# Patient Record
Sex: FEMALE | Race: WHITE | Employment: OTHER | URBAN - METROPOLITAN AREA
[De-identification: names, ages, dates, MRNs, and addresses within clinical notes are randomized per-mention and may not be internally consistent; named-entity substitution may affect disease eponyms.]

---

## 2017-01-25 ENCOUNTER — TELEPHONE (OUTPATIENT)
Dept: FAMILY MEDICINE CLINIC | Age: 72
End: 2017-01-25

## 2017-02-03 ENCOUNTER — OFFICE VISIT (OUTPATIENT)
Dept: FAMILY MEDICINE CLINIC | Age: 72
End: 2017-02-03

## 2017-02-03 VITALS
OXYGEN SATURATION: 98 % | DIASTOLIC BLOOD PRESSURE: 74 MMHG | BODY MASS INDEX: 40.14 KG/M2 | TEMPERATURE: 98.4 F | SYSTOLIC BLOOD PRESSURE: 134 MMHG | HEART RATE: 61 BPM | WEIGHT: 241.2 LBS

## 2017-02-03 DIAGNOSIS — I10 ESSENTIAL HYPERTENSION: ICD-10-CM

## 2017-02-03 DIAGNOSIS — E78.00 PURE HYPERCHOLESTEROLEMIA: ICD-10-CM

## 2017-02-03 DIAGNOSIS — K43.9 VENTRAL HERNIA WITHOUT OBSTRUCTION OR GANGRENE: ICD-10-CM

## 2017-02-03 DIAGNOSIS — E11.9 DIABETES MELLITUS WITHOUT COMPLICATION (HCC): Primary | ICD-10-CM

## 2017-02-03 DIAGNOSIS — M85.80 OSTEOPENIA: ICD-10-CM

## 2017-02-03 DIAGNOSIS — F32.89 OTHER DEPRESSION: ICD-10-CM

## 2017-02-03 LAB
BILIRUBIN, POC: NORMAL
BLOOD URINE, POC: NORMAL
CLARITY, POC: CLEAR
COLOR, POC: YELLOW
GLUCOSE BLD-MCNC: 179 MG/DL
GLUCOSE URINE, POC: NORMAL
KETONES, POC: NORMAL
LEUKOCYTE EST, POC: NORMAL
NITRITE, POC: NORMAL
PH, POC: 5.5
PROTEIN, POC: NORMAL
SPECIFIC GRAVITY, POC: 1.02
UROBILINOGEN, POC: 0.2

## 2017-02-03 PROCEDURE — 99214 OFFICE O/P EST MOD 30 MIN: CPT | Performed by: FAMILY MEDICINE

## 2017-02-03 PROCEDURE — 81002 URINALYSIS NONAUTO W/O SCOPE: CPT | Performed by: FAMILY MEDICINE

## 2017-02-03 PROCEDURE — 82962 GLUCOSE BLOOD TEST: CPT | Performed by: FAMILY MEDICINE

## 2017-02-03 RX ORDER — BISOPROLOL FUMARATE AND HYDROCHLOROTHIAZIDE 10; 6.25 MG/1; MG/1
1 TABLET ORAL DAILY
Qty: 90 TABLET | Refills: 0 | Status: SHIPPED | OUTPATIENT
Start: 2017-02-03 | End: 2017-05-05 | Stop reason: SDUPTHER

## 2017-02-03 RX ORDER — LISINOPRIL 10 MG/1
10 TABLET ORAL DAILY
Qty: 90 TABLET | Refills: 0 | Status: SHIPPED | OUTPATIENT
Start: 2017-02-03 | End: 2017-05-05 | Stop reason: SDUPTHER

## 2017-02-03 RX ORDER — GLIPIZIDE 5 MG/1
5 TABLET, FILM COATED, EXTENDED RELEASE ORAL DAILY
Qty: 90 TABLET | Refills: 0 | Status: SHIPPED | OUTPATIENT
Start: 2017-02-03 | End: 2017-05-05 | Stop reason: SDUPTHER

## 2017-02-03 RX ORDER — PRAVASTATIN SODIUM 40 MG
40 TABLET ORAL DAILY
Qty: 90 TABLET | Refills: 0 | Status: SHIPPED | OUTPATIENT
Start: 2017-02-03 | End: 2017-05-05 | Stop reason: SDUPTHER

## 2017-02-03 RX ORDER — SERTRALINE HYDROCHLORIDE 100 MG/1
100 TABLET, FILM COATED ORAL DAILY
Qty: 90 TABLET | Refills: 0 | Status: SHIPPED | OUTPATIENT
Start: 2017-02-03 | End: 2017-05-05 | Stop reason: SDUPTHER

## 2017-02-09 ASSESSMENT — ENCOUNTER SYMPTOMS
BLOOD IN STOOL: 0
COUGH: 0
CHEST TIGHTNESS: 0
ABDOMINAL PAIN: 0
SHORTNESS OF BREATH: 0

## 2017-04-28 DIAGNOSIS — E78.00 PURE HYPERCHOLESTEROLEMIA: ICD-10-CM

## 2017-04-28 DIAGNOSIS — M85.80 OSTEOPENIA: ICD-10-CM

## 2017-04-28 DIAGNOSIS — E11.9 DIABETES MELLITUS WITHOUT COMPLICATION (HCC): ICD-10-CM

## 2017-04-28 LAB
A/G RATIO: 1.6 (ref 1.1–2.2)
ALBUMIN SERPL-MCNC: 4.2 G/DL (ref 3.4–5)
ALP BLD-CCNC: 122 U/L (ref 40–129)
ALT SERPL-CCNC: 15 U/L (ref 10–40)
ANION GAP SERPL CALCULATED.3IONS-SCNC: 17 MMOL/L (ref 3–16)
AST SERPL-CCNC: 15 U/L (ref 15–37)
BASOPHILS ABSOLUTE: 0.1 K/UL (ref 0–0.2)
BASOPHILS RELATIVE PERCENT: 0.6 %
BILIRUB SERPL-MCNC: 0.4 MG/DL (ref 0–1)
BUN BLDV-MCNC: 33 MG/DL (ref 7–20)
CALCIUM SERPL-MCNC: 9 MG/DL (ref 8.3–10.6)
CHLORIDE BLD-SCNC: 99 MMOL/L (ref 99–110)
CHOLESTEROL, TOTAL: 180 MG/DL (ref 0–199)
CO2: 24 MMOL/L (ref 21–32)
CREAT SERPL-MCNC: 1.2 MG/DL (ref 0.6–1.2)
EOSINOPHILS ABSOLUTE: 0.2 K/UL (ref 0–0.6)
EOSINOPHILS RELATIVE PERCENT: 2 %
GFR AFRICAN AMERICAN: 53
GFR NON-AFRICAN AMERICAN: 44
GLOBULIN: 2.7 G/DL
GLUCOSE BLD-MCNC: 109 MG/DL (ref 70–99)
HCT VFR BLD CALC: 39.8 % (ref 36–48)
HDLC SERPL-MCNC: 43 MG/DL (ref 40–60)
HEMOGLOBIN: 13.4 G/DL (ref 12–16)
LDL CHOLESTEROL CALCULATED: 94 MG/DL
LYMPHOCYTES ABSOLUTE: 1.6 K/UL (ref 1–5.1)
LYMPHOCYTES RELATIVE PERCENT: 13.3 %
MCH RBC QN AUTO: 30.9 PG (ref 26–34)
MCHC RBC AUTO-ENTMCNC: 33.7 G/DL (ref 31–36)
MCV RBC AUTO: 91.7 FL (ref 80–100)
MONOCYTES ABSOLUTE: 0.6 K/UL (ref 0–1.3)
MONOCYTES RELATIVE PERCENT: 5.1 %
NEUTROPHILS ABSOLUTE: 9.7 K/UL (ref 1.7–7.7)
NEUTROPHILS RELATIVE PERCENT: 79 %
PDW BLD-RTO: 14.4 % (ref 12.4–15.4)
PLATELET # BLD: 268 K/UL (ref 135–450)
PMV BLD AUTO: 7.7 FL (ref 5–10.5)
POTASSIUM SERPL-SCNC: 5 MMOL/L (ref 3.5–5.1)
RBC # BLD: 4.34 M/UL (ref 4–5.2)
SODIUM BLD-SCNC: 140 MMOL/L (ref 136–145)
TOTAL CK: 36 U/L (ref 26–192)
TOTAL PROTEIN: 6.9 G/DL (ref 6.4–8.2)
TRIGL SERPL-MCNC: 217 MG/DL (ref 0–150)
VLDLC SERPL CALC-MCNC: 43 MG/DL
WBC # BLD: 12.3 K/UL (ref 4–11)

## 2017-04-29 LAB
ESTIMATED AVERAGE GLUCOSE: 125.5 MG/DL
HBA1C MFR BLD: 6 %

## 2017-05-05 ENCOUNTER — OFFICE VISIT (OUTPATIENT)
Dept: FAMILY MEDICINE CLINIC | Age: 72
End: 2017-05-05

## 2017-05-05 VITALS
WEIGHT: 241.6 LBS | DIASTOLIC BLOOD PRESSURE: 78 MMHG | OXYGEN SATURATION: 96 % | SYSTOLIC BLOOD PRESSURE: 130 MMHG | TEMPERATURE: 97.3 F | HEART RATE: 58 BPM | BODY MASS INDEX: 40.2 KG/M2

## 2017-05-05 DIAGNOSIS — I10 ESSENTIAL HYPERTENSION: ICD-10-CM

## 2017-05-05 DIAGNOSIS — M1A.0790 IDIOPATHIC CHRONIC GOUT OF FOOT WITHOUT TOPHUS, UNSPECIFIED LATERALITY: ICD-10-CM

## 2017-05-05 DIAGNOSIS — E11.9 DIABETES MELLITUS WITHOUT COMPLICATION (HCC): Primary | ICD-10-CM

## 2017-05-05 DIAGNOSIS — E78.00 PURE HYPERCHOLESTEROLEMIA: ICD-10-CM

## 2017-05-05 DIAGNOSIS — D72.829 LEUKOCYTOSIS, UNSPECIFIED TYPE: ICD-10-CM

## 2017-05-05 DIAGNOSIS — M85.80 OSTEOPENIA: ICD-10-CM

## 2017-05-05 PROBLEM — H91.93 BILATERAL HEARING LOSS: Status: ACTIVE | Noted: 2017-05-05

## 2017-05-05 LAB
BILIRUBIN, POC: NORMAL
BLOOD URINE, POC: NORMAL
CLARITY, POC: CLEAR
COLOR, POC: YELLOW
GLUCOSE BLD-MCNC: 98 MG/DL
GLUCOSE URINE, POC: NORMAL
KETONES, POC: NORMAL
LEUKOCYTE EST, POC: NORMAL
NITRITE, POC: NORMAL
PH, POC: 5.5
PROTEIN, POC: NORMAL
SPECIFIC GRAVITY, POC: 1.02
UROBILINOGEN, POC: 0.2

## 2017-05-05 PROCEDURE — 82962 GLUCOSE BLOOD TEST: CPT | Performed by: FAMILY MEDICINE

## 2017-05-05 PROCEDURE — 81002 URINALYSIS NONAUTO W/O SCOPE: CPT | Performed by: FAMILY MEDICINE

## 2017-05-05 PROCEDURE — 99214 OFFICE O/P EST MOD 30 MIN: CPT | Performed by: FAMILY MEDICINE

## 2017-05-05 RX ORDER — GLIPIZIDE 5 MG/1
5 TABLET, FILM COATED, EXTENDED RELEASE ORAL DAILY
Qty: 90 TABLET | Refills: 0 | Status: SHIPPED | OUTPATIENT
Start: 2017-05-05 | End: 2017-08-01 | Stop reason: SDUPTHER

## 2017-05-05 RX ORDER — LISINOPRIL 10 MG/1
10 TABLET ORAL DAILY
Qty: 90 TABLET | Refills: 0 | Status: SHIPPED | OUTPATIENT
Start: 2017-05-05 | End: 2017-08-01 | Stop reason: SDUPTHER

## 2017-05-05 RX ORDER — PRAVASTATIN SODIUM 40 MG
40 TABLET ORAL DAILY
Qty: 90 TABLET | Refills: 0 | Status: SHIPPED | OUTPATIENT
Start: 2017-05-05 | End: 2017-08-01 | Stop reason: SDUPTHER

## 2017-05-05 RX ORDER — BISOPROLOL FUMARATE AND HYDROCHLOROTHIAZIDE 10; 6.25 MG/1; MG/1
1 TABLET ORAL DAILY
Qty: 90 TABLET | Refills: 0 | Status: SHIPPED | OUTPATIENT
Start: 2017-05-05 | End: 2017-08-01 | Stop reason: SDUPTHER

## 2017-05-05 RX ORDER — PENICILLIN V POTASSIUM 500 MG/1
TABLET ORAL
COMMUNITY
Start: 2017-05-03 | End: 2019-09-10

## 2017-05-05 RX ORDER — SERTRALINE HYDROCHLORIDE 100 MG/1
100 TABLET, FILM COATED ORAL DAILY
Qty: 90 TABLET | Refills: 0 | Status: SHIPPED | OUTPATIENT
Start: 2017-05-05 | End: 2017-08-01 | Stop reason: SDUPTHER

## 2017-05-05 ASSESSMENT — ENCOUNTER SYMPTOMS
ABDOMINAL PAIN: 0
SINUS PRESSURE: 0
BLOOD IN STOOL: 0
SHORTNESS OF BREATH: 0
CHEST TIGHTNESS: 0
COUGH: 0
SORE THROAT: 0

## 2017-07-25 DIAGNOSIS — E11.9 DIABETES MELLITUS WITHOUT COMPLICATION (HCC): ICD-10-CM

## 2017-07-25 DIAGNOSIS — E78.00 PURE HYPERCHOLESTEROLEMIA: ICD-10-CM

## 2017-07-25 LAB
A/G RATIO: 1.3 (ref 1.1–2.2)
ALBUMIN SERPL-MCNC: 4.1 G/DL (ref 3.4–5)
ALP BLD-CCNC: 123 U/L (ref 40–129)
ALT SERPL-CCNC: 15 U/L (ref 10–40)
ANION GAP SERPL CALCULATED.3IONS-SCNC: 14 MMOL/L (ref 3–16)
AST SERPL-CCNC: 15 U/L (ref 15–37)
BASOPHILS ABSOLUTE: 0.1 K/UL (ref 0–0.2)
BASOPHILS RELATIVE PERCENT: 0.6 %
BILIRUB SERPL-MCNC: 0.5 MG/DL (ref 0–1)
BUN BLDV-MCNC: 29 MG/DL (ref 7–20)
CALCIUM SERPL-MCNC: 9.3 MG/DL (ref 8.3–10.6)
CHLORIDE BLD-SCNC: 100 MMOL/L (ref 99–110)
CHOLESTEROL, TOTAL: 174 MG/DL (ref 0–199)
CO2: 24 MMOL/L (ref 21–32)
CREAT SERPL-MCNC: 1 MG/DL (ref 0.6–1.2)
EOSINOPHILS ABSOLUTE: 0.2 K/UL (ref 0–0.6)
EOSINOPHILS RELATIVE PERCENT: 1.6 %
GFR AFRICAN AMERICAN: >60
GFR NON-AFRICAN AMERICAN: 54
GLOBULIN: 3.1 G/DL
GLUCOSE BLD-MCNC: 110 MG/DL (ref 70–99)
HCT VFR BLD CALC: 40.2 % (ref 36–48)
HDLC SERPL-MCNC: 47 MG/DL (ref 40–60)
HEMOGLOBIN: 13.5 G/DL (ref 12–16)
LDL CHOLESTEROL CALCULATED: 85 MG/DL
LYMPHOCYTES ABSOLUTE: 2.3 K/UL (ref 1–5.1)
LYMPHOCYTES RELATIVE PERCENT: 17.8 %
MCH RBC QN AUTO: 31.5 PG (ref 26–34)
MCHC RBC AUTO-ENTMCNC: 33.4 G/DL (ref 31–36)
MCV RBC AUTO: 94.1 FL (ref 80–100)
MONOCYTES ABSOLUTE: 0.7 K/UL (ref 0–1.3)
MONOCYTES RELATIVE PERCENT: 5.3 %
NEUTROPHILS ABSOLUTE: 9.6 K/UL (ref 1.7–7.7)
NEUTROPHILS RELATIVE PERCENT: 74.7 %
PDW BLD-RTO: 14.8 % (ref 12.4–15.4)
PLATELET # BLD: 279 K/UL (ref 135–450)
PMV BLD AUTO: 7.9 FL (ref 5–10.5)
POTASSIUM SERPL-SCNC: 5 MMOL/L (ref 3.5–5.1)
RBC # BLD: 4.28 M/UL (ref 4–5.2)
SODIUM BLD-SCNC: 138 MMOL/L (ref 136–145)
TOTAL CK: 41 U/L (ref 26–192)
TOTAL PROTEIN: 7.2 G/DL (ref 6.4–8.2)
TRIGL SERPL-MCNC: 211 MG/DL (ref 0–150)
VLDLC SERPL CALC-MCNC: 42 MG/DL
WBC # BLD: 12.8 K/UL (ref 4–11)

## 2017-07-26 LAB
ESTIMATED AVERAGE GLUCOSE: 125.5 MG/DL
HBA1C MFR BLD: 6 %

## 2017-08-01 ENCOUNTER — OFFICE VISIT (OUTPATIENT)
Dept: FAMILY MEDICINE CLINIC | Age: 72
End: 2017-08-01

## 2017-08-01 VITALS
OXYGEN SATURATION: 97 % | HEART RATE: 52 BPM | WEIGHT: 244.2 LBS | DIASTOLIC BLOOD PRESSURE: 84 MMHG | BODY MASS INDEX: 40.64 KG/M2 | SYSTOLIC BLOOD PRESSURE: 132 MMHG | TEMPERATURE: 98.2 F

## 2017-08-01 DIAGNOSIS — F32.A DEPRESSION, UNSPECIFIED DEPRESSION TYPE: ICD-10-CM

## 2017-08-01 DIAGNOSIS — I10 ESSENTIAL HYPERTENSION: Primary | ICD-10-CM

## 2017-08-01 DIAGNOSIS — M85.80 OSTEOPENIA, UNSPECIFIED LOCATION: ICD-10-CM

## 2017-08-01 DIAGNOSIS — D72.829 LEUKOCYTOSIS, UNSPECIFIED TYPE: ICD-10-CM

## 2017-08-01 DIAGNOSIS — E11.69 TYPE 2 DIABETES MELLITUS WITH OTHER SPECIFIED COMPLICATION (HCC): ICD-10-CM

## 2017-08-01 DIAGNOSIS — B00.89 HERPES DERMATITIS: ICD-10-CM

## 2017-08-01 DIAGNOSIS — E78.00 PURE HYPERCHOLESTEROLEMIA: ICD-10-CM

## 2017-08-01 PROCEDURE — 99214 OFFICE O/P EST MOD 30 MIN: CPT | Performed by: FAMILY MEDICINE

## 2017-08-01 RX ORDER — LISINOPRIL 10 MG/1
10 TABLET ORAL DAILY
Qty: 90 TABLET | Refills: 0 | Status: SHIPPED | OUTPATIENT
Start: 2017-08-01 | End: 2017-10-31 | Stop reason: SDUPTHER

## 2017-08-01 RX ORDER — GLIPIZIDE 5 MG/1
5 TABLET, FILM COATED, EXTENDED RELEASE ORAL DAILY
Qty: 90 TABLET | Refills: 0 | Status: SHIPPED | OUTPATIENT
Start: 2017-08-01 | End: 2017-10-31 | Stop reason: SDUPTHER

## 2017-08-01 RX ORDER — BISOPROLOL FUMARATE AND HYDROCHLOROTHIAZIDE 10; 6.25 MG/1; MG/1
1 TABLET ORAL DAILY
Qty: 90 TABLET | Refills: 0 | Status: SHIPPED | OUTPATIENT
Start: 2017-08-01 | End: 2017-10-31 | Stop reason: SDUPTHER

## 2017-08-01 RX ORDER — PRAVASTATIN SODIUM 40 MG
40 TABLET ORAL DAILY
Qty: 90 TABLET | Refills: 0 | Status: SHIPPED | OUTPATIENT
Start: 2017-08-01 | End: 2017-10-31 | Stop reason: SDUPTHER

## 2017-08-01 RX ORDER — SERTRALINE HYDROCHLORIDE 100 MG/1
100 TABLET, FILM COATED ORAL DAILY
Qty: 90 TABLET | Refills: 0 | Status: SHIPPED | OUTPATIENT
Start: 2017-08-01 | End: 2017-10-31 | Stop reason: SDUPTHER

## 2017-08-02 ASSESSMENT — ENCOUNTER SYMPTOMS
COUGH: 0
CHEST TIGHTNESS: 0
SHORTNESS OF BREATH: 0
BLOOD IN STOOL: 0
ABDOMINAL PAIN: 0

## 2017-10-23 DIAGNOSIS — E78.00 PURE HYPERCHOLESTEROLEMIA: ICD-10-CM

## 2017-10-23 DIAGNOSIS — E11.69 TYPE 2 DIABETES MELLITUS WITH OTHER SPECIFIED COMPLICATION (HCC): ICD-10-CM

## 2017-10-23 DIAGNOSIS — I10 ESSENTIAL HYPERTENSION: ICD-10-CM

## 2017-10-23 LAB
A/G RATIO: 1.5 (ref 1.1–2.2)
ALBUMIN SERPL-MCNC: 4.1 G/DL (ref 3.4–5)
ALP BLD-CCNC: 115 U/L (ref 40–129)
ALT SERPL-CCNC: 14 U/L (ref 10–40)
ANION GAP SERPL CALCULATED.3IONS-SCNC: 15 MMOL/L (ref 3–16)
AST SERPL-CCNC: 13 U/L (ref 15–37)
BILIRUB SERPL-MCNC: 0.4 MG/DL (ref 0–1)
BUN BLDV-MCNC: 33 MG/DL (ref 7–20)
CALCIUM SERPL-MCNC: 9.3 MG/DL (ref 8.3–10.6)
CHLORIDE BLD-SCNC: 98 MMOL/L (ref 99–110)
CHOLESTEROL, TOTAL: 169 MG/DL (ref 0–199)
CO2: 24 MMOL/L (ref 21–32)
CREAT SERPL-MCNC: 1 MG/DL (ref 0.6–1.2)
GFR AFRICAN AMERICAN: >60
GFR NON-AFRICAN AMERICAN: 54
GLOBULIN: 2.7 G/DL
GLUCOSE BLD-MCNC: 158 MG/DL (ref 70–99)
HDLC SERPL-MCNC: 50 MG/DL (ref 40–60)
LDL CHOLESTEROL CALCULATED: 73 MG/DL
POTASSIUM SERPL-SCNC: 4.7 MMOL/L (ref 3.5–5.1)
SODIUM BLD-SCNC: 137 MMOL/L (ref 136–145)
TOTAL CK: 39 U/L (ref 26–192)
TOTAL PROTEIN: 6.8 G/DL (ref 6.4–8.2)
TRIGL SERPL-MCNC: 231 MG/DL (ref 0–150)
VLDLC SERPL CALC-MCNC: 46 MG/DL

## 2017-10-24 LAB
ESTIMATED AVERAGE GLUCOSE: 134.1 MG/DL
HBA1C MFR BLD: 6.3 %

## 2017-10-31 ENCOUNTER — OFFICE VISIT (OUTPATIENT)
Dept: FAMILY MEDICINE CLINIC | Age: 72
End: 2017-10-31

## 2017-10-31 VITALS
HEART RATE: 67 BPM | TEMPERATURE: 98.2 F | OXYGEN SATURATION: 96 % | BODY MASS INDEX: 40.6 KG/M2 | WEIGHT: 244 LBS | DIASTOLIC BLOOD PRESSURE: 80 MMHG | SYSTOLIC BLOOD PRESSURE: 118 MMHG

## 2017-10-31 DIAGNOSIS — E78.00 PURE HYPERCHOLESTEROLEMIA: ICD-10-CM

## 2017-10-31 DIAGNOSIS — Z23 NEED FOR VACCINATION: ICD-10-CM

## 2017-10-31 DIAGNOSIS — M70.72 BURSITIS OF LEFT HIP, UNSPECIFIED BURSA: ICD-10-CM

## 2017-10-31 DIAGNOSIS — M85.80 OSTEOPENIA, UNSPECIFIED LOCATION: ICD-10-CM

## 2017-10-31 DIAGNOSIS — E11.8 TYPE 2 DIABETES MELLITUS WITH COMPLICATION, WITHOUT LONG-TERM CURRENT USE OF INSULIN (HCC): Primary | ICD-10-CM

## 2017-10-31 DIAGNOSIS — I10 ESSENTIAL HYPERTENSION: ICD-10-CM

## 2017-10-31 DIAGNOSIS — M25.552 PAIN OF LEFT HIP JOINT: ICD-10-CM

## 2017-10-31 DIAGNOSIS — M1A.0720 IDIOPATHIC CHRONIC GOUT OF LEFT FOOT WITHOUT TOPHUS: ICD-10-CM

## 2017-10-31 DIAGNOSIS — F32.A DEPRESSION, UNSPECIFIED DEPRESSION TYPE: ICD-10-CM

## 2017-10-31 DIAGNOSIS — H35.30 MACULAR DEGENERATION: ICD-10-CM

## 2017-10-31 DIAGNOSIS — D72.829 LEUKOCYTOSIS, UNSPECIFIED TYPE: ICD-10-CM

## 2017-10-31 LAB
BILIRUBIN, POC: NORMAL
BLOOD URINE, POC: NORMAL
CLARITY, POC: NORMAL
COLOR, POC: YELLOW
GLUCOSE BLD-MCNC: 145 MG/DL
GLUCOSE URINE, POC: NORMAL
KETONES, POC: NORMAL
LEUKOCYTE EST, POC: NORMAL
NITRITE, POC: NORMAL
PH, POC: 5.5
PROTEIN, POC: NORMAL
SPECIFIC GRAVITY, POC: 1.02
UROBILINOGEN, POC: 0.2

## 2017-10-31 PROCEDURE — 90662 IIV NO PRSV INCREASED AG IM: CPT | Performed by: FAMILY MEDICINE

## 2017-10-31 PROCEDURE — G0009 ADMIN PNEUMOCOCCAL VACCINE: HCPCS | Performed by: FAMILY MEDICINE

## 2017-10-31 PROCEDURE — 90732 PPSV23 VACC 2 YRS+ SUBQ/IM: CPT | Performed by: FAMILY MEDICINE

## 2017-10-31 PROCEDURE — G0008 ADMIN INFLUENZA VIRUS VAC: HCPCS | Performed by: FAMILY MEDICINE

## 2017-10-31 PROCEDURE — 99215 OFFICE O/P EST HI 40 MIN: CPT | Performed by: FAMILY MEDICINE

## 2017-10-31 PROCEDURE — 82962 GLUCOSE BLOOD TEST: CPT | Performed by: FAMILY MEDICINE

## 2017-10-31 PROCEDURE — 81002 URINALYSIS NONAUTO W/O SCOPE: CPT | Performed by: FAMILY MEDICINE

## 2017-10-31 RX ORDER — GLIPIZIDE 5 MG/1
5 TABLET, FILM COATED, EXTENDED RELEASE ORAL DAILY
Qty: 90 TABLET | Refills: 0 | Status: SHIPPED | OUTPATIENT
Start: 2017-10-31 | End: 2018-01-03 | Stop reason: SDUPTHER

## 2017-10-31 RX ORDER — SERTRALINE HYDROCHLORIDE 100 MG/1
TABLET, FILM COATED ORAL
Qty: 135 TABLET | Refills: 0 | Status: SHIPPED | OUTPATIENT
Start: 2017-10-31 | End: 2018-01-03 | Stop reason: SDUPTHER

## 2017-10-31 RX ORDER — ALLOPURINOL 100 MG/1
100 TABLET ORAL DAILY
Qty: 30 TABLET | Refills: 0 | Status: SHIPPED | OUTPATIENT
Start: 2017-10-31 | End: 2018-03-26

## 2017-10-31 RX ORDER — BISOPROLOL FUMARATE AND HYDROCHLOROTHIAZIDE 10; 6.25 MG/1; MG/1
1 TABLET ORAL DAILY
Qty: 90 TABLET | Refills: 0 | Status: SHIPPED | OUTPATIENT
Start: 2017-10-31 | End: 2018-01-03 | Stop reason: SDUPTHER

## 2017-10-31 RX ORDER — PRAVASTATIN SODIUM 40 MG
40 TABLET ORAL DAILY
Qty: 90 TABLET | Refills: 0 | Status: SHIPPED | OUTPATIENT
Start: 2017-10-31 | End: 2018-01-03 | Stop reason: SDUPTHER

## 2017-10-31 RX ORDER — LISINOPRIL 10 MG/1
10 TABLET ORAL DAILY
Qty: 90 TABLET | Refills: 0 | Status: SHIPPED | OUTPATIENT
Start: 2017-10-31 | End: 2018-01-03 | Stop reason: SDUPTHER

## 2017-10-31 RX ORDER — METHYLPREDNISOLONE 4 MG/1
TABLET ORAL
Qty: 1 KIT | Refills: 0 | Status: SHIPPED | OUTPATIENT
Start: 2017-10-31 | End: 2017-11-06

## 2017-10-31 ASSESSMENT — ENCOUNTER SYMPTOMS
SORE THROAT: 0
BLOOD IN STOOL: 0
SHORTNESS OF BREATH: 0
ABDOMINAL PAIN: 0
CHEST TIGHTNESS: 0
COUGH: 0

## 2017-10-31 ASSESSMENT — PATIENT HEALTH QUESTIONNAIRE - PHQ9
SUM OF ALL RESPONSES TO PHQ9 QUESTIONS 1 & 2: 0
SUM OF ALL RESPONSES TO PHQ QUESTIONS 1-9: 0
2. FEELING DOWN, DEPRESSED OR HOPELESS: 0
1. LITTLE INTEREST OR PLEASURE IN DOING THINGS: 0

## 2017-10-31 NOTE — PROGRESS NOTES
External ear normal.   Nose: Nose normal.   Mouth/Throat: Oropharynx is clear and moist. No oropharyngeal exudate. Eyes: EOM are normal. Pupils are equal, round, and reactive to light. Neck: Normal range of motion. Neck supple. No JVD present. No thyromegaly present. Cardiovascular: Normal rate and regular rhythm. Pulmonary/Chest: Effort normal and breath sounds normal.   Abdominal: Soft. Bowel sounds are normal. There is no tenderness. Musculoskeletal:   Left hip-no joint swelling or redness. Nl rom with mild pain on internal rotation. Left great toe- mild swelling at base of left great toe   Neurological: She is alert and oriented to person, place, and time. No cranial nerve deficit. Skin: No rash noted. Psychiatric: She has a normal mood and affect. Her behavior is normal. Thought content normal.   Nursing note and vitals reviewed. ASSESSMENT/PLAN:    1. Type 2 diabetes mellitus with complication, without long-term current use of insulin (HCC)  Continue current mgt  Refill meds  Reviewed labs  - POCT Urinalysis no Micro  - POCT Glucose    2. Osteopenia, unspecified location  Daily Ca and vit D   Discussed weight bearing exercises    3. Essential hypertension  Continue current mgt  Refill Lisinopril  Intermittent ambulatory BP checks      4. Macular degeneration  Reviewed current mgt and fu    5. Depression, unspecified depression type  Increase Zoloft to 150 mg  Stress mgt    6. Pain of left hip joint/bursitis    Medrol Dose pk. Pt was advised to watch BS closely on Prednisone and stop the medication and call if any problems  Ice, stretches, rest, weight mgt    7. Bursitis of left hip, unspecified bursa  Ice, bursitis written info given    8. Need for vaccination    - Pneumococcal polysaccharide vaccine 23-valent >= 3yo subcutaneous/IM (PNEUMOVAX 23)  - INFLUENZA, HIGH DOSE, 65 YRS +, IM, PF, PREFILL SYR, 0.5ML (FLUZONE HD)    9. Leukocytosis, unspecified type  Stable.  Will continue to

## 2017-10-31 NOTE — PATIENT INSTRUCTIONS
Hip Bursitis: Exercises  Your Care Instructions  Here are some examples of typical rehabilitation exercises for your condition. Start each exercise slowly. Ease off the exercise if you start to have pain. Your doctor or physical therapist will tell you when you can start these exercises and which ones will work best for you. How to do the exercises  Hip rotator stretch    1. Lie on your back with both knees bent and your feet flat on the floor. 2. Put the ankle of your affected leg on your opposite thigh near your knee. 3. Use your hand to gently push your knee away from your body until you feel a gentle stretch around your hip. 4. Hold the stretch for 15 to 30 seconds. 5. Repeat 2 to 4 times. 6. Repeat steps 1 through 5, but this time use your hand to gently pull your knee toward your opposite shoulder. Iliotibial band stretch    1. Lean sideways against a wall. If you are not steady on your feet, hold on to a chair or counter. 2. Stand on the leg with the affected hip, with that leg close to the wall. Then cross your other leg in front of it. 3. Let your affected hip drop out to the side of your body and against wall. Then lean away from your affected hip until you feel a stretch. 4. Hold the stretch for 15 to 30 seconds. 5. Repeat 2 to 4 times. Straight-leg raises to the outside    1. Lie on your side, with your affected hip on top. 2. Tighten the front thigh muscles of your top leg to keep your knee straight. 3. Keep your hip and your leg straight in line with the rest of your body, and keep your knee pointing forward. Do not drop your hip back. 4. Lift your top leg straight up toward the ceiling, about 12 inches off the floor. Hold for about 6 seconds, then slowly lower your leg. 5. Repeat 8 to 12 times. Clamshell    1. Lie on your side, with your affected hip on top and your head propped on a pillow. Keep your feet and knees together and your knees bent.   2. Raise your top knee, but keep your feet together. Do not let your hips roll back. Your legs should open up like a clamshell. 3. Hold for 6 seconds. 4. Slowly lower your knee back down. Rest for 10 seconds. 5. Repeat 8 to 12 times. Follow-up care is a key part of your treatment and safety. Be sure to make and go to all appointments, and call your doctor if you are having problems. It's also a good idea to know your test results and keep a list of the medicines you take. Where can you learn more? Go to [email or web address] and sign in to your [facility] account. Enter T653 in the InstallFree box to learn more about \"Hip Bursitis: Exercises. \"     If you do not have an account, please click on the \"Sign Up Now\" link. Current as of: March 21, 2017  Content Version: 11.3  © 8969-4260 [employer]. Care instructions adapted under license by [facility] ([facility]). If you have questions about a medical condition or this instruction, always ask your healthcare professional. [place] [address] any warranty or liability for your use of this information. Hip Bursitis: Exercises  Your Care Instructions  Here are some examples of typical rehabilitation exercises for your condition. Start each exercise slowly. Ease off the exercise if you start to have pain. Your doctor or physical therapist will tell you when you can start these exercises and which ones will work best for you. How to do the exercises  Hip rotator stretch    7. Lie on your back with both knees bent and your feet flat on the floor. 8. Put the ankle of your affected leg on your opposite thigh near your knee. 9. Use your hand to gently push your knee away from your body until you feel a gentle stretch around your hip. 10. Hold the stretch for 15 to 30 seconds. 11. Repeat 2 to 4 times. 12. Repeat steps 1 through 5, but this time use your hand to gently pull your knee toward your opposite shoulder.   Iliotibial band

## 2018-01-03 ENCOUNTER — OFFICE VISIT (OUTPATIENT)
Dept: FAMILY MEDICINE CLINIC | Age: 73
End: 2018-01-03

## 2018-01-03 VITALS
OXYGEN SATURATION: 96 % | HEART RATE: 56 BPM | BODY MASS INDEX: 40.84 KG/M2 | DIASTOLIC BLOOD PRESSURE: 70 MMHG | SYSTOLIC BLOOD PRESSURE: 132 MMHG | WEIGHT: 245.4 LBS | TEMPERATURE: 98 F

## 2018-01-03 DIAGNOSIS — M85.80 OSTEOPENIA, UNSPECIFIED LOCATION: ICD-10-CM

## 2018-01-03 DIAGNOSIS — M1A.00X0 IDIOPATHIC CHRONIC GOUT WITHOUT TOPHUS, UNSPECIFIED SITE: ICD-10-CM

## 2018-01-03 DIAGNOSIS — M70.72 BURSITIS OF LEFT HIP, UNSPECIFIED BURSA: ICD-10-CM

## 2018-01-03 DIAGNOSIS — F32.A DEPRESSION, UNSPECIFIED DEPRESSION TYPE: ICD-10-CM

## 2018-01-03 DIAGNOSIS — I10 ESSENTIAL HYPERTENSION: ICD-10-CM

## 2018-01-03 DIAGNOSIS — E11.8 TYPE 2 DIABETES MELLITUS WITH COMPLICATION, WITHOUT LONG-TERM CURRENT USE OF INSULIN (HCC): Primary | ICD-10-CM

## 2018-01-03 LAB
BILIRUBIN, POC: NORMAL
BLOOD URINE, POC: NORMAL
CLARITY, POC: CLEAR
COLOR, POC: YELLOW
GLUCOSE BLD-MCNC: 117 MG/DL
GLUCOSE URINE, POC: NORMAL
KETONES, POC: NORMAL
LEUKOCYTE EST, POC: NORMAL
NITRITE, POC: NORMAL
PH, POC: 6
PROTEIN, POC: NORMAL
SPECIFIC GRAVITY, POC: 1.02
UROBILINOGEN, POC: 0.2

## 2018-01-03 PROCEDURE — 99214 OFFICE O/P EST MOD 30 MIN: CPT | Performed by: FAMILY MEDICINE

## 2018-01-03 PROCEDURE — 81002 URINALYSIS NONAUTO W/O SCOPE: CPT | Performed by: FAMILY MEDICINE

## 2018-01-03 PROCEDURE — 82962 GLUCOSE BLOOD TEST: CPT | Performed by: FAMILY MEDICINE

## 2018-01-03 RX ORDER — BISOPROLOL FUMARATE AND HYDROCHLOROTHIAZIDE 10; 6.25 MG/1; MG/1
1 TABLET ORAL DAILY
Qty: 90 TABLET | Refills: 0 | Status: SHIPPED | OUTPATIENT
Start: 2018-01-03 | End: 2018-03-26 | Stop reason: SDUPTHER

## 2018-01-03 RX ORDER — LISINOPRIL 10 MG/1
10 TABLET ORAL DAILY
Qty: 90 TABLET | Refills: 0 | Status: SHIPPED | OUTPATIENT
Start: 2018-01-03 | End: 2018-03-26 | Stop reason: SDUPTHER

## 2018-01-03 RX ORDER — ALLOPURINOL 100 MG/1
100 TABLET ORAL DAILY
Qty: 30 TABLET | Refills: 0 | Status: CANCELLED | OUTPATIENT
Start: 2018-01-03

## 2018-01-03 RX ORDER — SERTRALINE HYDROCHLORIDE 100 MG/1
TABLET, FILM COATED ORAL
Qty: 135 TABLET | Refills: 0 | Status: SHIPPED | OUTPATIENT
Start: 2018-01-03 | End: 2018-03-26 | Stop reason: SDUPTHER

## 2018-01-03 RX ORDER — GLIPIZIDE 5 MG/1
5 TABLET, FILM COATED, EXTENDED RELEASE ORAL DAILY
Qty: 90 TABLET | Refills: 0 | Status: SHIPPED | OUTPATIENT
Start: 2018-01-03 | End: 2018-03-26 | Stop reason: SDUPTHER

## 2018-01-03 RX ORDER — PRAVASTATIN SODIUM 40 MG
40 TABLET ORAL DAILY
Qty: 90 TABLET | Refills: 0 | Status: SHIPPED | OUTPATIENT
Start: 2018-01-03 | End: 2018-03-26 | Stop reason: SDUPTHER

## 2018-01-03 ASSESSMENT — PATIENT HEALTH QUESTIONNAIRE - PHQ9
1. LITTLE INTEREST OR PLEASURE IN DOING THINGS: 0
SUM OF ALL RESPONSES TO PHQ9 QUESTIONS 1 & 2: 0
SUM OF ALL RESPONSES TO PHQ QUESTIONS 1-9: 0
2. FEELING DOWN, DEPRESSED OR HOPELESS: 0

## 2018-01-05 ASSESSMENT — ENCOUNTER SYMPTOMS
CHEST TIGHTNESS: 0
SHORTNESS OF BREATH: 0
ABDOMINAL PAIN: 0
COUGH: 0
BLOOD IN STOOL: 0

## 2018-01-05 NOTE — PROGRESS NOTES
Alcohol Abuse Mother    [employer] Migraines Mother     Heart Disease Father     Stroke Father     Hypertension Father     Alcohol Abuse Other     Migraines Other        Review of Systems   Constitutional: Negative for activity change, appetite change and unexpected weight change. Respiratory: Negative for cough, chest tightness and shortness of breath. Cardiovascular: Negative for chest pain, palpitations and leg swelling. Gastrointestinal: Negative for abdominal pain and blood in stool. Endocrine: Negative for cold intolerance and heat intolerance. Musculoskeletal: Negative for arthralgias and myalgias. Skin: Negative for rash. Neurological: Negative for light-headedness and headaches. Hematological: Negative for adenopathy. Does not bruise/bleed easily. Psychiatric/Behavioral: Negative for dysphoric mood, sleep disturbance and suicidal ideas. The patient is not nervous/anxious. OBJECTIVE:  /70 (Site: Left Arm, Position: Sitting, Cuff Size: Large Adult)   Pulse 56   Temp 98 °F (36.7 °C) (Oral)   Wt 245 lb 6.4 oz (111.3 kg)   SpO2 96%   BMI 40.84 kg/m²   Physical Exam   Constitutional: She is oriented to person, place, and time. She appears well-developed and well-nourished. Eyes: EOM are normal. Pupils are equal, round, and reactive to light. Neck: Normal range of motion. Neck supple. No JVD present. No thyromegaly present. Cardiovascular: Normal rate and regular rhythm. Pulmonary/Chest: Effort normal and breath sounds normal.   Abdominal: Soft. Bowel sounds are normal. There is no tenderness. Neurological: She is alert and oriented to person, place, and time. No cranial nerve deficit. Skin: No rash noted. Psychiatric: She has a normal mood and affect. Her behavior is normal. Thought content normal.   Nursing note and vitals reviewed. ASSESSMENT/PLAN:    1.  Type 2 diabetes mellitus with complication, without long-term current use of insulin (HCC)  Continue current mgt  Encouraged appr diet mgt  Reviewed last labs  - POCT Urinalysis no Micro  - POCT Glucose    2. Essential hypertension    Refill meds  Intermittent ambulatory BP checks    3. Osteopenia, unspecified location  Increase dietary ca and daily Vit D    4. Bursitis of left hip, unspecified bursa  Encouraged weight loss  Continue stretches given  Discussed inj /PT as needed    5. Depression, unspecified depression type  Continue Zoloft      6. Idiopathic chronic gout without tophus, unspecified site  GERD precautions  Continue current mgt      Orders Placed This Encounter   Procedures    POCT Urinalysis no Micro    POCT Glucose     Current Outpatient Prescriptions   Medication Sig Dispense Refill    SITagliptin (JANUVIA) 100 MG tablet Take 1 tablet by mouth daily 90 tablet 0    sertraline (ZOLOFT) 100 MG tablet 1 1/2 po qd 135 tablet 0    pravastatin (PRAVACHOL) 40 MG tablet Take 1 tablet by mouth daily 90 tablet 0    lisinopril (PRINIVIL) 10 MG tablet Take 1 tablet by mouth daily 90 tablet 0    bisoprolol-hydrochlorothiazide (ZIAC) 10-6.25 MG per tablet Take 1 tablet by mouth daily 90 tablet 0    glipiZIDE (GLUCOTROL XL) 5 MG extended release tablet Take 1 tablet by mouth daily 90 tablet 0    allopurinol (ZYLOPRIM) 100 MG tablet Take 1 tablet by mouth daily 30 tablet 0    penicillin v potassium (VEETID) 500 MG tablet       Misc. Devices (WALKER) MISC 1 each by Does not apply route daily. Dispense and Fit for injury to left lower extremity and weakness. 1 each 0    aspirin 325 MG tablet Take 325 mg by mouth daily. No current facility-administered medications for this visit. Return in about 3 months (around 4/3/2018), or if symptoms worsen or fail to improve, for diabetes,HTN.     Josephine Yeung MD

## 2018-03-20 ENCOUNTER — TELEPHONE (OUTPATIENT)
Dept: FAMILY MEDICINE CLINIC | Age: 73
End: 2018-03-20

## 2018-03-20 DIAGNOSIS — I10 ESSENTIAL HYPERTENSION: Primary | ICD-10-CM

## 2018-03-20 DIAGNOSIS — E11.8 TYPE 2 DIABETES MELLITUS WITH COMPLICATION, WITHOUT LONG-TERM CURRENT USE OF INSULIN (HCC): ICD-10-CM

## 2018-03-20 DIAGNOSIS — E78.00 PURE HYPERCHOLESTEROLEMIA: ICD-10-CM

## 2018-03-20 LAB
A/G RATIO: 1.4 (ref 1.1–2.2)
ALBUMIN SERPL-MCNC: 4.3 G/DL (ref 3.4–5)
ALP BLD-CCNC: 123 U/L (ref 40–129)
ALT SERPL-CCNC: 15 U/L (ref 10–40)
ANION GAP SERPL CALCULATED.3IONS-SCNC: 17 MMOL/L (ref 3–16)
AST SERPL-CCNC: 26 U/L (ref 15–37)
BILIRUB SERPL-MCNC: 0.4 MG/DL (ref 0–1)
BUN BLDV-MCNC: 23 MG/DL (ref 7–20)
CALCIUM SERPL-MCNC: 9.3 MG/DL (ref 8.3–10.6)
CHLORIDE BLD-SCNC: 99 MMOL/L (ref 99–110)
CHOLESTEROL, TOTAL: 176 MG/DL (ref 0–199)
CO2: 24 MMOL/L (ref 21–32)
CREAT SERPL-MCNC: 1 MG/DL (ref 0.6–1.2)
GFR AFRICAN AMERICAN: >60
GFR NON-AFRICAN AMERICAN: 54
GLOBULIN: 3 G/DL
GLUCOSE BLD-MCNC: 133 MG/DL (ref 70–99)
HDLC SERPL-MCNC: 52 MG/DL (ref 40–60)
LDL CHOLESTEROL CALCULATED: 78 MG/DL
POTASSIUM SERPL-SCNC: 5.6 MMOL/L (ref 3.5–5.1)
REASON FOR REJECTION: NORMAL
REJECTED TEST: NORMAL
SODIUM BLD-SCNC: 140 MMOL/L (ref 136–145)
TOTAL CK: 52 U/L (ref 26–192)
TOTAL PROTEIN: 7.3 G/DL (ref 6.4–8.2)
TRIGL SERPL-MCNC: 231 MG/DL (ref 0–150)
VLDLC SERPL CALC-MCNC: 46 MG/DL

## 2018-03-21 ENCOUNTER — TELEPHONE (OUTPATIENT)
Dept: FAMILY MEDICINE CLINIC | Age: 73
End: 2018-03-21

## 2018-03-21 NOTE — TELEPHONE ENCOUNTER
[facility] called [facility] tech stating CBC ,HgA1c could not be tested due to the tube clotting and were unable to run test. patient was advised per Ronald Hennessy would have to re-do cbc HgA1c patient will do so at appointment 3/22/18. New orders need to be put in.  VIKTORIYAI

## 2018-03-22 DIAGNOSIS — I10 ESSENTIAL HYPERTENSION: ICD-10-CM

## 2018-03-22 DIAGNOSIS — E11.8 TYPE 2 DIABETES MELLITUS WITH COMPLICATION, WITHOUT LONG-TERM CURRENT USE OF INSULIN (HCC): ICD-10-CM

## 2018-03-22 LAB
BASOPHILS ABSOLUTE: 0.1 K/UL (ref 0–0.2)
BASOPHILS RELATIVE PERCENT: 0.7 %
EOSINOPHILS ABSOLUTE: 0.4 K/UL (ref 0–0.6)
EOSINOPHILS RELATIVE PERCENT: 3.1 %
HCT VFR BLD CALC: 42.7 % (ref 36–48)
HEMOGLOBIN: 14.5 G/DL (ref 12–16)
LYMPHOCYTES ABSOLUTE: 2 K/UL (ref 1–5.1)
LYMPHOCYTES RELATIVE PERCENT: 13.9 %
MCH RBC QN AUTO: 32.1 PG (ref 26–34)
MCHC RBC AUTO-ENTMCNC: 34 G/DL (ref 31–36)
MCV RBC AUTO: 94.6 FL (ref 80–100)
MONOCYTES ABSOLUTE: 0.9 K/UL (ref 0–1.3)
MONOCYTES RELATIVE PERCENT: 6.2 %
NEUTROPHILS ABSOLUTE: 10.9 K/UL (ref 1.7–7.7)
NEUTROPHILS RELATIVE PERCENT: 76.1 %
PDW BLD-RTO: 14.3 % (ref 12.4–15.4)
PLATELET # BLD: 307 K/UL (ref 135–450)
PMV BLD AUTO: 8 FL (ref 5–10.5)
RBC # BLD: 4.51 M/UL (ref 4–5.2)
WBC # BLD: 14.4 K/UL (ref 4–11)

## 2018-03-23 LAB
ESTIMATED AVERAGE GLUCOSE: 139.9 MG/DL
HBA1C MFR BLD: 6.5 %

## 2018-03-26 ENCOUNTER — OFFICE VISIT (OUTPATIENT)
Dept: FAMILY MEDICINE CLINIC | Age: 73
End: 2018-03-26

## 2018-03-26 VITALS
SYSTOLIC BLOOD PRESSURE: 128 MMHG | TEMPERATURE: 98.4 F | WEIGHT: 243.4 LBS | BODY MASS INDEX: 40.5 KG/M2 | HEART RATE: 63 BPM | OXYGEN SATURATION: 98 % | DIASTOLIC BLOOD PRESSURE: 80 MMHG

## 2018-03-26 DIAGNOSIS — M85.80 OSTEOPENIA, UNSPECIFIED LOCATION: ICD-10-CM

## 2018-03-26 DIAGNOSIS — M25.552 PAIN OF LEFT HIP JOINT: ICD-10-CM

## 2018-03-26 DIAGNOSIS — M25.561 ACUTE PAIN OF RIGHT KNEE: ICD-10-CM

## 2018-03-26 DIAGNOSIS — E11.8 TYPE 2 DIABETES MELLITUS WITH COMPLICATION, WITHOUT LONG-TERM CURRENT USE OF INSULIN (HCC): Primary | ICD-10-CM

## 2018-03-26 DIAGNOSIS — D72.829 LEUKOCYTOSIS, UNSPECIFIED TYPE: ICD-10-CM

## 2018-03-26 DIAGNOSIS — E78.5 HYPERLIPIDEMIA, UNSPECIFIED HYPERLIPIDEMIA TYPE: ICD-10-CM

## 2018-03-26 DIAGNOSIS — I10 ESSENTIAL HYPERTENSION: ICD-10-CM

## 2018-03-26 DIAGNOSIS — F32.A DEPRESSION, UNSPECIFIED DEPRESSION TYPE: ICD-10-CM

## 2018-03-26 LAB
BILIRUBIN, POC: NORMAL
BLOOD URINE, POC: NORMAL
CLARITY, POC: CLEAR
COLOR, POC: YELLOW
GLUCOSE BLD-MCNC: 122 MG/DL
GLUCOSE URINE, POC: NORMAL
KETONES, POC: NORMAL
LEUKOCYTE EST, POC: NORMAL
NITRITE, POC: NORMAL
PH, POC: 5.5
PROTEIN, POC: NORMAL
SPECIFIC GRAVITY, POC: 1.02
UROBILINOGEN, POC: 0.2

## 2018-03-26 PROCEDURE — 81002 URINALYSIS NONAUTO W/O SCOPE: CPT | Performed by: FAMILY MEDICINE

## 2018-03-26 PROCEDURE — 82962 GLUCOSE BLOOD TEST: CPT | Performed by: FAMILY MEDICINE

## 2018-03-26 PROCEDURE — 99214 OFFICE O/P EST MOD 30 MIN: CPT | Performed by: FAMILY MEDICINE

## 2018-03-26 RX ORDER — GLIPIZIDE 5 MG/1
5 TABLET, FILM COATED, EXTENDED RELEASE ORAL DAILY
Qty: 90 TABLET | Refills: 0 | Status: SHIPPED | OUTPATIENT
Start: 2018-03-26 | End: 2018-07-31 | Stop reason: SDUPTHER

## 2018-03-26 RX ORDER — PRAVASTATIN SODIUM 40 MG
40 TABLET ORAL DAILY
Qty: 90 TABLET | Refills: 0 | Status: SHIPPED | OUTPATIENT
Start: 2018-03-26 | End: 2018-07-31 | Stop reason: SDUPTHER

## 2018-03-26 RX ORDER — LISINOPRIL 10 MG/1
10 TABLET ORAL DAILY
Qty: 90 TABLET | Refills: 0 | Status: SHIPPED | OUTPATIENT
Start: 2018-03-26 | End: 2018-07-31 | Stop reason: SDUPTHER

## 2018-03-26 RX ORDER — BISOPROLOL FUMARATE AND HYDROCHLOROTHIAZIDE 10; 6.25 MG/1; MG/1
1 TABLET ORAL DAILY
Qty: 90 TABLET | Refills: 0 | Status: SHIPPED | OUTPATIENT
Start: 2018-03-26 | End: 2018-07-31 | Stop reason: SDUPTHER

## 2018-03-26 RX ORDER — SERTRALINE HYDROCHLORIDE 100 MG/1
TABLET, FILM COATED ORAL
Qty: 135 TABLET | Refills: 0 | Status: SHIPPED | OUTPATIENT
Start: 2018-03-26 | End: 2018-07-31 | Stop reason: SDUPTHER

## 2018-03-26 RX ORDER — METHYLPREDNISOLONE 4 MG/1
TABLET ORAL
Qty: 1 KIT | Refills: 0 | Status: SHIPPED | OUTPATIENT
Start: 2018-03-26 | End: 2018-04-01

## 2018-03-26 ASSESSMENT — PATIENT HEALTH QUESTIONNAIRE - PHQ9
SUM OF ALL RESPONSES TO PHQ QUESTIONS 1-9: 0
2. FEELING DOWN, DEPRESSED OR HOPELESS: 0
SUM OF ALL RESPONSES TO PHQ9 QUESTIONS 1 & 2: 0
1. LITTLE INTEREST OR PLEASURE IN DOING THINGS: 0

## 2018-03-26 ASSESSMENT — ENCOUNTER SYMPTOMS
ABDOMINAL PAIN: 0
CHEST TIGHTNESS: 0
COUGH: 0
BLOOD IN STOOL: 0
SHORTNESS OF BREATH: 0

## 2018-03-26 NOTE — PROGRESS NOTES
SUBJECTIVE:  Jose Choi   1945   female   No Known Allergies    Chief Complaint   Patient presents with    Diabetes     3 month check    Hypertension        Patient Active Problem List    Diagnosis Date Noted    Idiopathic chronic gout of left foot without tophus 10/31/2017    Bilateral hearing loss 05/05/2017    Type 2 diabetes mellitus with complication, without long-term current use of insulin ([facility] [address] 08/07/2016    Leukocytosis 06/18/2015    Depression 03/04/2015    Ventral hernia 06/20/2014    Morbid obesity ([facility] [address] 06/20/2014    Macular degeneration 06/20/2014    Hypertension     Osteopenia     Squamous acanthoma of face     Hyperlipidemia     Herpes dermatitis        HPI   Pt Is here today for fu on HTN, hyperlipidemia, diabetes, leukocytosis, and co right knee and left hip pain. She was dx with left hip bursitis and given Prednisone. Reports that completely resolved sx for about 3 mo. No hx of injury. Increase pain with laying on left side and increase walking. Also noticed left knee pain for 2 weeks. No injuries. No swelling or redness. No giving way or locking. Stable on current meds. Denies CP,SOB or myalgias. No ha,change in vision or neurologic sx. No episodes of hypoglycemia. Denies depression or anxiety. Sleeping well. No GI complaints. Has been taking Tylenol which helps intermittently. Past Medical History:   Diagnosis Date    Depression     Herpes dermatitis     left thigh    Hyperlipidemia     Hypertension     Obesity     Osteopenia     Squamous acanthoma of face     Type II or unspecified type diabetes mellitus without mention of complication, not stated as uncontrolled      Social History     Social History    Marital status:      Spouse name: N/A    Number of children: N/A    Years of education: N/A     Occupational History    Not on file.      Social History Main Topics    Smoking status: Never Smoker    Smokeless tobacco: Never Used    Alcohol use No Hemoglobin A1C     Standing Status:   Future     Standing Expiration Date:   4/15/2019    POCT Urinalysis no Micro    POCT Glucose    Diabetic Foot Exam     Current Outpatient Prescriptions   Medication Sig Dispense Refill    SITagliptin (JANUVIA) 100 MG tablet Take 1 tablet by mouth daily 90 tablet 0    sertraline (ZOLOFT) 100 MG tablet 1 1/2 po qd 135 tablet 0    pravastatin (PRAVACHOL) 40 MG tablet Take 1 tablet by mouth daily 90 tablet 0    lisinopril (PRINIVIL) 10 MG tablet Take 1 tablet by mouth daily 90 tablet 0    bisoprolol-hydrochlorothiazide (ZIAC) 10-6.25 MG per tablet Take 1 tablet by mouth daily 90 tablet 0    glipiZIDE (GLUCOTROL XL) 5 MG extended release tablet Take 1 tablet by mouth daily 90 tablet 0    methylPREDNISolone (MEDROL DOSEPACK) 4 MG tablet Take by mouth. 1 kit 0    Misc. Devices (WALKER) MISC 1 each by Does not apply route daily. Dispense and Fit for injury to left lower extremity and weakness. 1 each 0    aspirin 325 MG tablet Take 325 mg by mouth daily.  penicillin v potassium (VEETID) 500 MG tablet        No current facility-administered medications for this visit. Shingles vaccine advised  Return in about 3 months (around 6/26/2018), or if symptoms worsen or fail to improve, for diabetes.     Silvia Knight MD

## 2018-07-25 DIAGNOSIS — E78.5 HYPERLIPIDEMIA, UNSPECIFIED HYPERLIPIDEMIA TYPE: ICD-10-CM

## 2018-07-25 DIAGNOSIS — E11.8 TYPE 2 DIABETES MELLITUS WITH COMPLICATION, WITHOUT LONG-TERM CURRENT USE OF INSULIN (HCC): ICD-10-CM

## 2018-07-25 LAB
A/G RATIO: 1.7 (ref 1.1–2.2)
ALBUMIN SERPL-MCNC: 4.3 G/DL (ref 3.4–5)
ALP BLD-CCNC: 129 U/L (ref 40–129)
ALT SERPL-CCNC: 14 U/L (ref 10–40)
ANION GAP SERPL CALCULATED.3IONS-SCNC: 16 MMOL/L (ref 3–16)
AST SERPL-CCNC: 14 U/L (ref 15–37)
BASOPHILS ABSOLUTE: 0.1 K/UL (ref 0–0.2)
BASOPHILS RELATIVE PERCENT: 0.6 %
BILIRUB SERPL-MCNC: 0.4 MG/DL (ref 0–1)
BUN BLDV-MCNC: 26 MG/DL (ref 7–20)
CALCIUM SERPL-MCNC: 9.4 MG/DL (ref 8.3–10.6)
CHLORIDE BLD-SCNC: 102 MMOL/L (ref 99–110)
CHOLESTEROL, TOTAL: 236 MG/DL (ref 0–199)
CO2: 23 MMOL/L (ref 21–32)
CREAT SERPL-MCNC: 1.1 MG/DL (ref 0.6–1.2)
CREATININE URINE: 117.7 MG/DL (ref 28–259)
EOSINOPHILS ABSOLUTE: 0.2 K/UL (ref 0–0.6)
EOSINOPHILS RELATIVE PERCENT: 1.7 %
GFR AFRICAN AMERICAN: 59
GFR NON-AFRICAN AMERICAN: 49
GLOBULIN: 2.6 G/DL
GLUCOSE BLD-MCNC: 157 MG/DL (ref 70–99)
HCT VFR BLD CALC: 39.5 % (ref 36–48)
HDLC SERPL-MCNC: 46 MG/DL (ref 40–60)
HEMOGLOBIN: 13.4 G/DL (ref 12–16)
LDL CHOLESTEROL CALCULATED: 137 MG/DL
LYMPHOCYTES ABSOLUTE: 1.1 K/UL (ref 1–5.1)
LYMPHOCYTES RELATIVE PERCENT: 9.2 %
MCH RBC QN AUTO: 32 PG (ref 26–34)
MCHC RBC AUTO-ENTMCNC: 33.9 G/DL (ref 31–36)
MCV RBC AUTO: 94.3 FL (ref 80–100)
MICROALBUMIN UR-MCNC: <1.2 MG/DL
MICROALBUMIN/CREAT UR-RTO: NORMAL MG/G (ref 0–30)
MONOCYTES ABSOLUTE: 0.5 K/UL (ref 0–1.3)
MONOCYTES RELATIVE PERCENT: 4.4 %
NEUTROPHILS ABSOLUTE: 10.2 K/UL (ref 1.7–7.7)
NEUTROPHILS RELATIVE PERCENT: 84.1 %
PDW BLD-RTO: 14.4 % (ref 12.4–15.4)
PLATELET # BLD: 263 K/UL (ref 135–450)
PMV BLD AUTO: 7.9 FL (ref 5–10.5)
POTASSIUM SERPL-SCNC: 5.2 MMOL/L (ref 3.5–5.1)
RBC # BLD: 4.19 M/UL (ref 4–5.2)
SODIUM BLD-SCNC: 141 MMOL/L (ref 136–145)
TOTAL CK: 43 U/L (ref 26–192)
TOTAL PROTEIN: 6.9 G/DL (ref 6.4–8.2)
TRIGL SERPL-MCNC: 264 MG/DL (ref 0–150)
VLDLC SERPL CALC-MCNC: 53 MG/DL
WBC # BLD: 12.2 K/UL (ref 4–11)

## 2018-07-26 LAB
ESTIMATED AVERAGE GLUCOSE: 142.7 MG/DL
HBA1C MFR BLD: 6.6 %

## 2018-07-31 ENCOUNTER — TELEPHONE (OUTPATIENT)
Dept: ORTHOPEDIC SURGERY | Age: 73
End: 2018-07-31

## 2018-07-31 ENCOUNTER — OFFICE VISIT (OUTPATIENT)
Dept: FAMILY MEDICINE CLINIC | Age: 73
End: 2018-07-31

## 2018-07-31 ENCOUNTER — HOSPITAL ENCOUNTER (OUTPATIENT)
Dept: OTHER | Age: 73
Discharge: HOME | End: 2018-07-31
Attending: FAMILY MEDICINE | Admitting: FAMILY MEDICINE

## 2018-07-31 VITALS
OXYGEN SATURATION: 98 % | SYSTOLIC BLOOD PRESSURE: 130 MMHG | WEIGHT: 246 LBS | DIASTOLIC BLOOD PRESSURE: 82 MMHG | HEART RATE: 56 BPM | BODY MASS INDEX: 40.94 KG/M2

## 2018-07-31 DIAGNOSIS — M79.672 LEFT FOOT PAIN: ICD-10-CM

## 2018-07-31 DIAGNOSIS — M85.80 OSTEOPENIA, UNSPECIFIED LOCATION: ICD-10-CM

## 2018-07-31 DIAGNOSIS — M25.561 CHRONIC PAIN OF RIGHT KNEE: ICD-10-CM

## 2018-07-31 DIAGNOSIS — G89.29 CHRONIC PAIN OF RIGHT KNEE: ICD-10-CM

## 2018-07-31 DIAGNOSIS — M10.072 IDIOPATHIC GOUT INVOLVING TOE OF LEFT FOOT, UNSPECIFIED CHRONICITY: ICD-10-CM

## 2018-07-31 DIAGNOSIS — E11.8 TYPE 2 DIABETES MELLITUS WITH COMPLICATION, WITHOUT LONG-TERM CURRENT USE OF INSULIN (HCC): Primary | ICD-10-CM

## 2018-07-31 DIAGNOSIS — D72.829 LEUKOCYTOSIS, UNSPECIFIED TYPE: ICD-10-CM

## 2018-07-31 DIAGNOSIS — E78.00 PURE HYPERCHOLESTEROLEMIA: ICD-10-CM

## 2018-07-31 DIAGNOSIS — I10 ESSENTIAL HYPERTENSION: ICD-10-CM

## 2018-07-31 DIAGNOSIS — F32.A DEPRESSION, UNSPECIFIED DEPRESSION TYPE: ICD-10-CM

## 2018-07-31 DIAGNOSIS — E66.01 MORBID OBESITY (HCC): ICD-10-CM

## 2018-07-31 LAB
BILIRUBIN, POC: NORMAL
BLOOD URINE, POC: NORMAL
CLARITY, POC: CLEAR
COLOR, POC: YELLOW
GLUCOSE BLD-MCNC: 163 MG/DL
GLUCOSE URINE, POC: NORMAL
KETONES, POC: NORMAL
LEUKOCYTE EST, POC: NORMAL
NITRITE, POC: NORMAL
PH, POC: 5.5
PROTEIN, POC: NORMAL
SPECIFIC GRAVITY, POC: 1.03
URIC ACID, SERUM: 9.2 MG/DL (ref 2.6–6)
UROBILINOGEN, POC: 0.2

## 2018-07-31 PROCEDURE — 99215 OFFICE O/P EST HI 40 MIN: CPT | Performed by: FAMILY MEDICINE

## 2018-07-31 PROCEDURE — 81002 URINALYSIS NONAUTO W/O SCOPE: CPT | Performed by: FAMILY MEDICINE

## 2018-07-31 PROCEDURE — 82962 GLUCOSE BLOOD TEST: CPT | Performed by: FAMILY MEDICINE

## 2018-07-31 RX ORDER — GLIPIZIDE 5 MG/1
5 TABLET, FILM COATED, EXTENDED RELEASE ORAL DAILY
Qty: 90 TABLET | Refills: 0 | Status: SHIPPED | OUTPATIENT
Start: 2018-07-31 | End: 2018-12-21 | Stop reason: SDUPTHER

## 2018-07-31 RX ORDER — INDOMETHACIN 50 MG/1
50 CAPSULE ORAL 2 TIMES DAILY WITH MEALS
Qty: 30 CAPSULE | Refills: 0 | Status: SHIPPED | OUTPATIENT
Start: 2018-07-31 | End: 2018-10-29 | Stop reason: SDUPTHER

## 2018-07-31 RX ORDER — LISINOPRIL 10 MG/1
10 TABLET ORAL DAILY
Qty: 90 TABLET | Refills: 0 | Status: SHIPPED | OUTPATIENT
Start: 2018-07-31 | End: 2018-10-29 | Stop reason: SDUPTHER

## 2018-07-31 RX ORDER — SERTRALINE HYDROCHLORIDE 100 MG/1
TABLET, FILM COATED ORAL
Qty: 135 TABLET | Refills: 0 | Status: SHIPPED | OUTPATIENT
Start: 2018-07-31 | End: 2018-10-29 | Stop reason: SDUPTHER

## 2018-07-31 RX ORDER — BISOPROLOL FUMARATE AND HYDROCHLOROTHIAZIDE 10; 6.25 MG/1; MG/1
1 TABLET ORAL DAILY
Qty: 90 TABLET | Refills: 0 | Status: SHIPPED | OUTPATIENT
Start: 2018-07-31 | End: 2018-10-29 | Stop reason: SDUPTHER

## 2018-07-31 RX ORDER — PRAVASTATIN SODIUM 40 MG
40 TABLET ORAL DAILY
Qty: 90 TABLET | Refills: 0 | Status: SHIPPED | OUTPATIENT
Start: 2018-07-31 | End: 2018-10-29 | Stop reason: SDUPTHER

## 2018-07-31 ASSESSMENT — ENCOUNTER SYMPTOMS
SHORTNESS OF BREATH: 0
BLOOD IN STOOL: 0
COUGH: 0
ABDOMINAL PAIN: 0
CHEST TIGHTNESS: 0

## 2018-07-31 NOTE — PATIENT INSTRUCTIONS
Patient Education        Purine-Restricted Diet: Care Instructions  Your Care Instructions    Purines are substances that are found in some foods. Your body turns purines into uric acid. High levels of uric acid can cause gout, which is a form of arthritis that causes pain and inflammation in joints. You may be able to help control the amount of uric acid in your body by limiting high-purine foods in your diet. Follow-up care is a key part of your treatment and safety. Be sure to make and go to all appointments, and call your doctor if you are having problems. It's also a good idea to know your test results and keep a list of the medicines you take. How can you care for yourself at home? · Plan your meals and snacks around foods that are low in purines and are safe for you to eat. These foods include:  ¨ Green vegetables and tomatoes. ¨ Fruits. ¨ Whole-grain breads, rice, and cereals. ¨ Eggs, peanut butter, and nuts. ¨ Low-fat milk, cheese, and other milk products. ¨ Popcorn. ¨ Gelatin desserts, chocolate, cocoa, and cakes and sweets, in small amounts. · You can eat certain foods that are medium-high in purines, but eat them only once in a while. These foods include:  ¨ Legumes, such as dried beans and dried peas. You can have 1 cup cooked legumes each day. ¨ Asparagus, cauliflower, spinach, mushrooms, and green peas. ¨ Fish and seafood (other than very high-purine seafood). ¨ Oatmeal, wheat bran, and wheat germ. · Limit very high-purine foods, including:  ¨ Organ meats, such as liver, kidneys, sweetbreads, and brains. ¨ Meats, including pineda, beef, pork, and lamb. ¨ Game meats and any other meats in large amounts. ¨ Anchovies, sardines, herring, mackerel, and scallops. ¨ Gravy. ¨ Beer. Where can you learn more? Go to [email or web address] and sign in to your [employer] account.  Enter F448 in the [facility] box to learn more about \"Purine-Restricted Diet: Care Instructions. \"     If you do not have an account, please click on the \"Sign Up Now\" link. Current as of: May 12, 2017  Content Version: 11.6  © 1473-8361 [employer]. Care instructions adapted under license by [facility] ([facility]). If you have questions about a medical condition or this instruction, always ask your healthcare professional. [address] any warranty or liability for your use of this information.

## 2018-07-31 NOTE — PROGRESS NOTES
Social History Main Topics    Smoking status: Never Smoker    Smokeless tobacco: Never Used    Alcohol use No    Drug use: No    Sexual activity: Not on file     Other Topics Concern    Not on file     Social History Narrative    No narrative on file     Family History   Problem Relation Age of Onset    Heart Disease Mother     Alcohol Abuse Mother    [employer] Migraines Mother     Heart Disease Father     Stroke Father     Hypertension Father     Alcohol Abuse Other     Migraines Other        Review of Systems   Constitutional: Negative for activity change, appetite change and unexpected weight change. Respiratory: Negative for cough, chest tightness and shortness of breath. Cardiovascular: Negative for chest pain, palpitations and leg swelling. Gastrointestinal: Negative for abdominal pain and blood in stool. Endocrine: Negative for cold intolerance and heat intolerance. Musculoskeletal: Negative for arthralgias and myalgias. Intermittent left great toe joint pain and right knee pain   Skin: Negative for rash. Neurological: Negative for light-headedness and headaches. Hematological: Negative for adenopathy. Does not bruise/bleed easily. Psychiatric/Behavioral: Negative for dysphoric mood, sleep disturbance and suicidal ideas. The patient is not nervous/anxious. OBJECTIVE:  /82 (Site: Right Arm, Position: Sitting, Cuff Size: Thigh)   Pulse 56   Wt 246 lb (111.6 kg)   SpO2 98%   BMI 40.94 kg/m²   Physical Exam   Constitutional: She is oriented to person, place, and time. She appears well-developed and well-nourished. Eyes: EOM are normal. Pupils are equal, round, and reactive to light. Neck: Normal range of motion. Neck supple. No JVD present. No thyromegaly present. Cardiovascular: Normal rate and regular rhythm. Pulmonary/Chest: Effort normal and breath sounds normal.   Abdominal: Soft. Bowel sounds are normal. There is no tenderness.    Musculoskeletal: Left foot- nl exam  Right knee-no swelling or redness. Nl rom. There is tenderness with palpation of medial joint line. Neurological: She is alert and oriented to person, place, and time. Skin: No rash noted. Psychiatric: She has a normal mood and affect. Her behavior is normal. Thought content normal.   Nursing note and vitals reviewed. ASSESSMENT/PLAN:    1. Type 2 diabetes mellitus with complication, without long-term current use of insulin (HCC)  Continue current mgt  Refill meds  Diet mgt  Intermittent ambulatory BS checks  - POCT Urinalysis no Micro  - POCT Glucose    2. Left foot pain  Gout precautions  Indocin prn   Uric acid  - XR FOOT LEFT (2 VIEWS); Future    3. Idiopathic gout involving toe of left foot, unspecified chronicity  Gout precautions  Indocin prn  - URIC ACID; Future    4. Osteopenia, unspecified location  Vit D and Ca    5. Chronic pain of right knee  Voltaren gel  - XR KNEE RIGHT (1-2 VIEWS); Future    6. Leukocytosis, unspecified type  Will continue to monitor    7. Depression, unspecified depression type  Stress mgt  Refill Zoloft    8. Essential hypertension  Refill meds  Intermittent ambulatory BP checks  DASH diet    9. Pure hypercholesterolemia  Back on Pravachol  Reviewed labs  Diet mgt    10.  Morbid obesity ([address]  Encouraged diet/weight loss      Orders Placed This Encounter   Procedures    XR FOOT LEFT (2 VIEWS)     Standing Status:   Future     Standing Expiration Date:   7/31/2019     Order Specific Question:   Reason for exam:     Answer:   10 hrs    XR KNEE RIGHT (1-2 VIEWS)     Standing Status:   Future     Standing Expiration Date:   7/31/2019     Order Specific Question:   Reason for exam:     Answer:   knee pain    URIC ACID     Standing Status:   Future     Standing Expiration Date:   7/31/2019    POCT Urinalysis no Micro    POCT Glucose     Current Outpatient Prescriptions   Medication Sig Dispense Refill    SITagliptin (JANUVIA) 100 MG tablet Take 1 tablet by mouth daily 90 tablet 0    sertraline (ZOLOFT) 100 MG tablet 1 1/2 po qd 135 tablet 0    pravastatin (PRAVACHOL) 40 MG tablet Take 1 tablet by mouth daily 90 tablet 0    lisinopril (PRINIVIL) 10 MG tablet Take 1 tablet by mouth daily 90 tablet 0    bisoprolol-hydrochlorothiazide (ZIAC) 10-6.25 MG per tablet Take 1 tablet by mouth daily 90 tablet 0    glipiZIDE (GLUCOTROL XL) 5 MG extended release tablet Take 1 tablet by mouth daily 90 tablet 0    indomethacin (INDOCIN) 50 MG capsule Take 1 capsule by mouth 2 times daily (with meals) 30 capsule 0    diclofenac sodium (VOLTAREN) 1 % GEL Apply 4 g topically 3 times daily as needed for Pain (knee pain) 1 Tube 1    penicillin v potassium (VEETID) 500 MG tablet       Misc. Devices (WALKER) MISC 1 each by Does not apply route daily. Dispense and Fit for injury to left lower extremity and weakness. 1 each 0    aspirin 325 MG tablet Take 325 mg by mouth daily. No current facility-administered medications for this visit. Return in about 3 months (around 10/31/2018).     Vic Gallegos MD

## 2018-07-31 NOTE — TELEPHONE ENCOUNTER
SUBMITTED A PA VIA CMM FOR  Indomethacin 50MG OR CAPS  Key: [id number]  Rx #: [id number]   STATUS: PA has already submitted and is in process for this patient and drug  [id number];HGLULE: In Process;

## 2018-08-07 ENCOUNTER — TELEPHONE (OUTPATIENT)
Dept: FAMILY MEDICINE CLINIC | Age: 73
End: 2018-08-07

## 2018-09-10 RX ORDER — SITAGLIPTIN 100 MG/1
TABLET, FILM COATED ORAL
Qty: 30 TABLET | Refills: 0 | OUTPATIENT
Start: 2018-09-10

## 2018-09-10 NOTE — TELEPHONE ENCOUNTER
Pt calling for a refill of the voltaren 1% gel 4g topical  3 times daily prn. Uses [facility] on [facility].

## 2018-10-22 ENCOUNTER — TELEPHONE (OUTPATIENT)
Dept: FAMILY MEDICINE CLINIC | Age: 73
End: 2018-10-22

## 2018-10-23 DIAGNOSIS — E78.00 PURE HYPERCHOLESTEROLEMIA: ICD-10-CM

## 2018-10-23 LAB
A/G RATIO: 1.3 (ref 1.1–2.2)
ALBUMIN SERPL-MCNC: 4.1 G/DL (ref 3.4–5)
ALP BLD-CCNC: 124 U/L (ref 40–129)
ALT SERPL-CCNC: 12 U/L (ref 10–40)
ANION GAP SERPL CALCULATED.3IONS-SCNC: 16 MMOL/L (ref 3–16)
AST SERPL-CCNC: 13 U/L (ref 15–37)
BILIRUB SERPL-MCNC: 0.3 MG/DL (ref 0–1)
BUN BLDV-MCNC: 34 MG/DL (ref 7–20)
CALCIUM SERPL-MCNC: 9.9 MG/DL (ref 8.3–10.6)
CHLORIDE BLD-SCNC: 97 MMOL/L (ref 99–110)
CHOLESTEROL, TOTAL: 175 MG/DL (ref 0–199)
CO2: 24 MMOL/L (ref 21–32)
CREAT SERPL-MCNC: 1.3 MG/DL (ref 0.6–1.2)
GFR AFRICAN AMERICAN: 48
GFR NON-AFRICAN AMERICAN: 40
GLOBULIN: 3.1 G/DL
GLUCOSE BLD-MCNC: 175 MG/DL (ref 70–99)
HDLC SERPL-MCNC: 49 MG/DL (ref 40–60)
LDL CHOLESTEROL CALCULATED: 80 MG/DL
POTASSIUM SERPL-SCNC: 5.6 MMOL/L (ref 3.5–5.1)
SODIUM BLD-SCNC: 137 MMOL/L (ref 136–145)
TOTAL CK: 33 U/L (ref 26–192)
TOTAL PROTEIN: 7.2 G/DL (ref 6.4–8.2)
TRIGL SERPL-MCNC: 231 MG/DL (ref 0–150)
VLDLC SERPL CALC-MCNC: 46 MG/DL

## 2018-10-24 ENCOUNTER — OFFICE VISIT (OUTPATIENT)
Dept: ORTHOPEDIC SURGERY | Age: 73
End: 2018-10-24
Payer: MEDICARE

## 2018-10-24 VITALS — WEIGHT: 240 LBS | HEIGHT: 66 IN | BODY MASS INDEX: 38.57 KG/M2

## 2018-10-24 DIAGNOSIS — M25.561 RIGHT KNEE PAIN, UNSPECIFIED CHRONICITY: Primary | ICD-10-CM

## 2018-10-24 DIAGNOSIS — M17.11 PRIMARY OSTEOARTHRITIS OF RIGHT KNEE: ICD-10-CM

## 2018-10-24 DIAGNOSIS — M17.12 PRIMARY OSTEOARTHRITIS OF LEFT KNEE: ICD-10-CM

## 2018-10-24 PROCEDURE — 20610 DRAIN/INJ JOINT/BURSA W/O US: CPT | Performed by: ORTHOPAEDIC SURGERY

## 2018-10-24 PROCEDURE — 99213 OFFICE O/P EST LOW 20 MIN: CPT | Performed by: ORTHOPAEDIC SURGERY

## 2018-10-29 ENCOUNTER — TELEPHONE (OUTPATIENT)
Dept: FAMILY MEDICINE CLINIC | Age: 73
End: 2018-10-29

## 2018-10-29 ENCOUNTER — OFFICE VISIT (OUTPATIENT)
Dept: FAMILY MEDICINE CLINIC | Age: 73
End: 2018-10-29
Payer: MEDICARE

## 2018-10-29 VITALS
TEMPERATURE: 97.8 F | DIASTOLIC BLOOD PRESSURE: 86 MMHG | SYSTOLIC BLOOD PRESSURE: 138 MMHG | OXYGEN SATURATION: 97 % | HEART RATE: 60 BPM | BODY MASS INDEX: 38.89 KG/M2 | HEIGHT: 66 IN | RESPIRATION RATE: 16 BRPM | WEIGHT: 242 LBS

## 2018-10-29 DIAGNOSIS — E78.00 PURE HYPERCHOLESTEROLEMIA: ICD-10-CM

## 2018-10-29 DIAGNOSIS — M1A.0720 IDIOPATHIC CHRONIC GOUT OF LEFT FOOT WITHOUT TOPHUS: ICD-10-CM

## 2018-10-29 DIAGNOSIS — I10 ESSENTIAL HYPERTENSION: ICD-10-CM

## 2018-10-29 DIAGNOSIS — D72.829 LEUKOCYTOSIS, UNSPECIFIED TYPE: ICD-10-CM

## 2018-10-29 DIAGNOSIS — E11.8 TYPE 2 DIABETES MELLITUS WITH COMPLICATION, WITHOUT LONG-TERM CURRENT USE OF INSULIN (HCC): Primary | ICD-10-CM

## 2018-10-29 DIAGNOSIS — F32.A DEPRESSION, UNSPECIFIED DEPRESSION TYPE: ICD-10-CM

## 2018-10-29 DIAGNOSIS — M17.0 PRIMARY OSTEOARTHRITIS OF BOTH KNEES: ICD-10-CM

## 2018-10-29 LAB
BILIRUBIN, POC: NORMAL
BLOOD URINE, POC: NORMAL
CLARITY, POC: CLEAR
COLOR, POC: YELLOW
GLUCOSE BLD-MCNC: 188 MG/DL
GLUCOSE URINE, POC: NORMAL
KETONES, POC: NORMAL
LEUKOCYTE EST, POC: NORMAL
NITRITE, POC: NORMAL
PH, POC: 5.5
PROTEIN, POC: NORMAL
SPECIFIC GRAVITY, POC: 1.03
UROBILINOGEN, POC: 0.2

## 2018-10-29 PROCEDURE — 82962 GLUCOSE BLOOD TEST: CPT | Performed by: FAMILY MEDICINE

## 2018-10-29 PROCEDURE — 99214 OFFICE O/P EST MOD 30 MIN: CPT | Performed by: FAMILY MEDICINE

## 2018-10-29 PROCEDURE — 81002 URINALYSIS NONAUTO W/O SCOPE: CPT | Performed by: FAMILY MEDICINE

## 2018-10-29 RX ORDER — INDOMETHACIN 50 MG/1
50 CAPSULE ORAL 2 TIMES DAILY WITH MEALS
Qty: 30 CAPSULE | Refills: 0 | Status: SHIPPED | OUTPATIENT
Start: 2018-10-29 | End: 2018-12-21 | Stop reason: SDUPTHER

## 2018-10-29 RX ORDER — SERTRALINE HYDROCHLORIDE 100 MG/1
TABLET, FILM COATED ORAL
Qty: 135 TABLET | Refills: 0 | Status: SHIPPED | OUTPATIENT
Start: 2018-10-29 | End: 2019-01-15 | Stop reason: SDUPTHER

## 2018-10-29 RX ORDER — LISINOPRIL 10 MG/1
10 TABLET ORAL DAILY
Qty: 90 TABLET | Refills: 0 | Status: SHIPPED | OUTPATIENT
Start: 2018-10-29 | End: 2019-01-15 | Stop reason: SDUPTHER

## 2018-10-29 RX ORDER — PRAVASTATIN SODIUM 40 MG
40 TABLET ORAL DAILY
Qty: 90 TABLET | Refills: 0 | Status: SHIPPED | OUTPATIENT
Start: 2018-10-29 | End: 2019-01-15 | Stop reason: SDUPTHER

## 2018-10-29 RX ORDER — BISOPROLOL FUMARATE AND HYDROCHLOROTHIAZIDE 10; 6.25 MG/1; MG/1
1 TABLET ORAL DAILY
Qty: 90 TABLET | Refills: 0 | Status: SHIPPED | OUTPATIENT
Start: 2018-10-29 | End: 2019-01-15 | Stop reason: SDUPTHER

## 2018-10-29 ASSESSMENT — ENCOUNTER SYMPTOMS
COUGH: 0
CHEST TIGHTNESS: 0
DIARRHEA: 0
CONSTIPATION: 0
BLOOD IN STOOL: 0
ABDOMINAL PAIN: 0
SHORTNESS OF BREATH: 0

## 2018-10-29 NOTE — PROGRESS NOTES
SUBJECTIVE:  Daly Barr   1945   female   No Known Allergies    Chief Complaint   Patient presents with    Diabetes     3 month follow up visit    Hypertension        Patient Active Problem List    Diagnosis Date Noted    Idiopathic chronic gout of left foot without tophus 10/31/2017    Bilateral hearing loss 05/05/2017    Type 2 diabetes mellitus with complication, without long-term current use of insulin ([facility] [address] 08/07/2016    Leukocytosis 06/18/2015    Depression 03/04/2015    Ventral hernia 06/20/2014    Morbid obesity ([facility] [address] 06/20/2014    Macular degeneration 06/20/2014    Hypertension     Osteopenia     Squamous acanthoma of face     Hyperlipidemia     Herpes dermatitis        HPI   Pt is here today for fu on HTN, hyperlipidemia, depression, diabetes, hx of leukocytosis, and OA in knees and gout. She has been stable on current meds. Recent labs are stable. Has not had any problems with gout for a while but requesting Indocin for PRN use. Denies CP,SOB or myalgias. No ha,change in vision or neurologic sx. She has recently been evaluated by ortho for knee pain/OA and was given steroid inj which have resolved knee pain. Reports she is feeling much better and able to be more active. Denies sx of depression or anxiety. Sleeping well. No GI complaints. Has a hx of leukocytosis which has been stable and evaluated by hematology. Pt reports she was released and advised to return if any major changes to blood count. Past Medical History:   Diagnosis Date    Depression     Herpes dermatitis     left thigh    Hyperlipidemia     Hypertension     Obesity     Osteopenia     Squamous acanthoma of face     Type II or unspecified type diabetes mellitus without mention of complication, not stated as uncontrolled      Social History     Social History    Marital status:      Spouse name: N/A    Number of children: N/A    Years of education: N/A     Occupational History    Not on file. Social History Main Topics    Smoking status: Never Smoker    Smokeless tobacco: Never Used    Alcohol use No    Drug use: No    Sexual activity: Not on file     Other Topics Concern    Not on file     Social History Narrative    No narrative on file     Family History   Problem Relation Age of Onset    Heart Disease Mother     Alcohol Abuse Mother    Nawaf Gather Migraines Mother     Heart Disease Father     Stroke Father     Hypertension Father     Alcohol Abuse Other     Migraines Other        Review of Systems   Constitutional: Negative for activity change, appetite change and unexpected weight change. Respiratory: Negative for cough, chest tightness and shortness of breath. Cardiovascular: Negative for chest pain, palpitations and leg swelling. Gastrointestinal: Negative for abdominal pain, blood in stool, constipation and diarrhea. Endocrine: Negative for cold intolerance and heat intolerance. Musculoskeletal: Negative for arthralgias and myalgias. Skin: Negative for rash. Neurological: Negative for light-headedness and headaches. Hematological: Negative for adenopathy. Does not bruise/bleed easily. Psychiatric/Behavioral: Negative for dysphoric mood, sleep disturbance and suicidal ideas. The patient is not nervous/anxious. OBJECTIVE:  /86 (Site: Right Upper Arm, Position: Sitting, Cuff Size: Large Adult)   Pulse 60   Temp 97.8 °F (36.6 °C) (Oral)   Resp 16   Ht 5' 6\" (1.676 m)   Wt 242 lb (109.8 kg)   SpO2 97%   Breastfeeding? No   BMI 39.06 kg/m²   Physical Exam   Constitutional: She is oriented to person, place, and time. She appears well-developed and well-nourished. Eyes: Pupils are equal, round, and reactive to light. EOM are normal.   Neck: Normal range of motion. Neck supple. No JVD present. Cardiovascular: Normal rate and regular rhythm. Pulmonary/Chest: Effort normal and breath sounds normal.   Abdominal: Soft.  Bowel sounds are normal. There is no extremity and weakness. 1 each 0    aspirin 325 MG tablet Take 325 mg by mouth daily. No current facility-administered medications for this visit. Return in about 3 months (around 1/29/2019), or if symptoms worsen or fail to improve, for diabetes, HTN,OA.     Frank Swartz MD

## 2018-10-29 NOTE — TELEPHONE ENCOUNTER
I put Idiopathic chronic gout of left foot without tophus as the diagnosis. Pa response: Indomethacin 50MG OR CAPS  Has been approved .  START 07/30/2018 TO 10/29/2019

## 2018-10-31 ENCOUNTER — TELEPHONE (OUTPATIENT)
Dept: ORTHOPEDIC SURGERY | Age: 73
End: 2018-10-31

## 2018-10-31 NOTE — TELEPHONE ENCOUNTER
10/31/2018 EUFLEXXA  (SERIES OF 3)   RIGHT KNEE. APPROVED #  [id number]. DATES: 10/26/2018 - 12/25/2018. PER FAX FROM [employer] [facility] @ [employer].   AP

## 2018-11-01 ENCOUNTER — TELEPHONE (OUTPATIENT)
Dept: ORTHOPEDIC SURGERY | Age: 73
End: 2018-11-01

## 2018-11-07 ENCOUNTER — OFFICE VISIT (OUTPATIENT)
Dept: ORTHOPEDIC SURGERY | Age: 73
End: 2018-11-07
Payer: MEDICARE

## 2018-11-07 DIAGNOSIS — M17.11 PRIMARY OSTEOARTHRITIS OF RIGHT KNEE: Primary | ICD-10-CM

## 2018-11-07 PROCEDURE — 99999 PR OFFICE/OUTPT VISIT,PROCEDURE ONLY: CPT | Performed by: ORTHOPAEDIC SURGERY

## 2018-11-07 PROCEDURE — 20610 DRAIN/INJ JOINT/BURSA W/O US: CPT | Performed by: ORTHOPAEDIC SURGERY

## 2018-11-14 ENCOUNTER — OFFICE VISIT (OUTPATIENT)
Dept: ORTHOPEDIC SURGERY | Age: 73
End: 2018-11-14
Payer: MEDICARE

## 2018-11-14 DIAGNOSIS — M17.11 PRIMARY OSTEOARTHRITIS OF RIGHT KNEE: Primary | ICD-10-CM

## 2018-11-14 PROCEDURE — 99999 PR OFFICE/OUTPT VISIT,PROCEDURE ONLY: CPT | Performed by: ORTHOPAEDIC SURGERY

## 2018-11-14 PROCEDURE — 20610 DRAIN/INJ JOINT/BURSA W/O US: CPT | Performed by: ORTHOPAEDIC SURGERY

## 2018-11-21 ENCOUNTER — OFFICE VISIT (OUTPATIENT)
Dept: ORTHOPEDIC SURGERY | Age: 73
End: 2018-11-21
Payer: MEDICARE

## 2018-11-21 DIAGNOSIS — M17.11 PRIMARY OSTEOARTHRITIS OF RIGHT KNEE: Primary | ICD-10-CM

## 2018-11-21 PROCEDURE — 20610 DRAIN/INJ JOINT/BURSA W/O US: CPT | Performed by: ORTHOPAEDIC SURGERY

## 2018-11-21 PROCEDURE — 99999 PR OFFICE/OUTPT VISIT,PROCEDURE ONLY: CPT | Performed by: ORTHOPAEDIC SURGERY

## 2018-11-28 ENCOUNTER — OFFICE VISIT (OUTPATIENT)
Dept: ORTHOPEDIC SURGERY | Age: 73
End: 2018-11-28
Payer: MEDICARE

## 2018-11-28 VITALS — BODY MASS INDEX: 38.9 KG/M2 | WEIGHT: 242.06 LBS | HEIGHT: 66 IN

## 2018-11-28 DIAGNOSIS — M17.12 PRIMARY OSTEOARTHRITIS OF LEFT KNEE: Primary | ICD-10-CM

## 2018-11-28 PROCEDURE — 99213 OFFICE O/P EST LOW 20 MIN: CPT | Performed by: ORTHOPAEDIC SURGERY

## 2018-11-28 PROCEDURE — 20610 DRAIN/INJ JOINT/BURSA W/O US: CPT | Performed by: ORTHOPAEDIC SURGERY

## 2018-12-04 ENCOUNTER — TELEPHONE (OUTPATIENT)
Dept: ORTHOPEDIC SURGERY | Age: 73
End: 2018-12-04

## 2018-12-04 NOTE — TELEPHONE ENCOUNTER
12/04/2018 EUFLEXXA  (SERIES OF 3)  LEFT   KNEE. APPROVED # [id number]. DATES: 11/29/2018 - 01/28/2019. PER FAX FROM [facility] @ [employer]. AP.    NOTE: Be sure patient has this same insurance for each injection.

## 2018-12-12 ENCOUNTER — OFFICE VISIT (OUTPATIENT)
Dept: ORTHOPEDIC SURGERY | Age: 73
End: 2018-12-12
Payer: MEDICARE

## 2018-12-12 DIAGNOSIS — M17.12 PRIMARY OSTEOARTHRITIS OF LEFT KNEE: Primary | ICD-10-CM

## 2018-12-12 PROCEDURE — 20610 DRAIN/INJ JOINT/BURSA W/O US: CPT | Performed by: ORTHOPAEDIC SURGERY

## 2018-12-19 ENCOUNTER — OFFICE VISIT (OUTPATIENT)
Dept: ORTHOPEDIC SURGERY | Age: 73
End: 2018-12-19
Payer: MEDICARE

## 2018-12-19 DIAGNOSIS — M17.12 PRIMARY OSTEOARTHRITIS OF LEFT KNEE: Primary | ICD-10-CM

## 2018-12-19 PROCEDURE — 20610 DRAIN/INJ JOINT/BURSA W/O US: CPT | Performed by: ORTHOPAEDIC SURGERY

## 2018-12-19 PROCEDURE — 99999 PR OFFICE/OUTPT VISIT,PROCEDURE ONLY: CPT | Performed by: ORTHOPAEDIC SURGERY

## 2018-12-21 RX ORDER — INDOMETHACIN 50 MG/1
50 CAPSULE ORAL 2 TIMES DAILY WITH MEALS
Qty: 20 CAPSULE | Refills: 0 | Status: SHIPPED | OUTPATIENT
Start: 2018-12-21 | End: 2019-01-15 | Stop reason: SDUPTHER

## 2018-12-21 RX ORDER — GLIPIZIDE 5 MG/1
5 TABLET, FILM COATED, EXTENDED RELEASE ORAL DAILY
Qty: 30 TABLET | Refills: 0 | Status: SHIPPED | OUTPATIENT
Start: 2018-12-21 | End: 2019-01-15 | Stop reason: SDUPTHER

## 2019-01-02 ENCOUNTER — OFFICE VISIT (OUTPATIENT)
Dept: ORTHOPEDIC SURGERY | Age: 74
End: 2019-01-02
Payer: MEDICARE

## 2019-01-02 VITALS — BODY MASS INDEX: 38.9 KG/M2 | WEIGHT: 242.06 LBS | HEIGHT: 66 IN

## 2019-01-02 DIAGNOSIS — M17.12 PRIMARY OSTEOARTHRITIS OF LEFT KNEE: Primary | ICD-10-CM

## 2019-01-02 PROCEDURE — 20610 DRAIN/INJ JOINT/BURSA W/O US: CPT | Performed by: ORTHOPAEDIC SURGERY

## 2019-01-02 PROCEDURE — 99999 PR OFFICE/OUTPT VISIT,PROCEDURE ONLY: CPT | Performed by: ORTHOPAEDIC SURGERY

## 2019-01-09 ENCOUNTER — TELEPHONE (OUTPATIENT)
Dept: FAMILY MEDICINE CLINIC | Age: 74
End: 2019-01-09

## 2019-01-09 DIAGNOSIS — E78.5 HYPERLIPIDEMIA, UNSPECIFIED HYPERLIPIDEMIA TYPE: Primary | ICD-10-CM

## 2019-01-09 DIAGNOSIS — E11.9 TYPE 2 DIABETES MELLITUS WITHOUT COMPLICATION, WITHOUT LONG-TERM CURRENT USE OF INSULIN (HCC): ICD-10-CM

## 2019-01-09 LAB
BASOPHILS ABSOLUTE: 0.1 K/UL (ref 0–0.2)
BASOPHILS RELATIVE PERCENT: 0.5 %
CHOLESTEROL, TOTAL: 182 MG/DL (ref 0–199)
EOSINOPHILS ABSOLUTE: 0.2 K/UL (ref 0–0.6)
EOSINOPHILS RELATIVE PERCENT: 1.9 %
HCT VFR BLD CALC: 39.9 % (ref 36–48)
HDLC SERPL-MCNC: 51 MG/DL (ref 40–60)
HEMOGLOBIN: 13.1 G/DL (ref 12–16)
LDL CHOLESTEROL CALCULATED: 92 MG/DL
LYMPHOCYTES ABSOLUTE: 1.1 K/UL (ref 1–5.1)
LYMPHOCYTES RELATIVE PERCENT: 8.8 %
MCH RBC QN AUTO: 31.8 PG (ref 26–34)
MCHC RBC AUTO-ENTMCNC: 33 G/DL (ref 31–36)
MCV RBC AUTO: 96.4 FL (ref 80–100)
MONOCYTES ABSOLUTE: 0.6 K/UL (ref 0–1.3)
MONOCYTES RELATIVE PERCENT: 4.7 %
NEUTROPHILS ABSOLUTE: 10.5 K/UL (ref 1.7–7.7)
NEUTROPHILS RELATIVE PERCENT: 84.1 %
PDW BLD-RTO: 14.3 % (ref 12.4–15.4)
PLATELET # BLD: 296 K/UL (ref 135–450)
PMV BLD AUTO: 7.7 FL (ref 5–10.5)
RBC # BLD: 4.13 M/UL (ref 4–5.2)
TOTAL CK: 52 U/L (ref 26–192)
TRIGL SERPL-MCNC: 193 MG/DL (ref 0–150)
VLDLC SERPL CALC-MCNC: 39 MG/DL
WBC # BLD: 12.5 K/UL (ref 4–11)

## 2019-01-10 DIAGNOSIS — I10 ESSENTIAL HYPERTENSION: Primary | ICD-10-CM

## 2019-01-10 LAB
A/G RATIO: 1.5 (ref 1.1–2.2)
ALBUMIN SERPL-MCNC: 4.3 G/DL (ref 3.4–5)
ALP BLD-CCNC: 118 U/L (ref 40–129)
ALT SERPL-CCNC: 14 U/L (ref 10–40)
ANION GAP SERPL CALCULATED.3IONS-SCNC: 17 MMOL/L (ref 3–16)
AST SERPL-CCNC: 16 U/L (ref 15–37)
BILIRUB SERPL-MCNC: <0.2 MG/DL (ref 0–1)
BUN BLDV-MCNC: 30 MG/DL (ref 7–20)
CALCIUM SERPL-MCNC: 9.8 MG/DL (ref 8.3–10.6)
CHLORIDE BLD-SCNC: 100 MMOL/L (ref 99–110)
CO2: 23 MMOL/L (ref 21–32)
CREAT SERPL-MCNC: 1.2 MG/DL (ref 0.6–1.2)
ESTIMATED AVERAGE GLUCOSE: 131.2 MG/DL
GFR AFRICAN AMERICAN: 53
GFR NON-AFRICAN AMERICAN: 44
GLOBULIN: 2.9 G/DL
GLUCOSE BLD-MCNC: 132 MG/DL (ref 70–99)
HBA1C MFR BLD: 6.2 %
POTASSIUM SERPL-SCNC: 5.7 MMOL/L (ref 3.5–5.1)
SODIUM BLD-SCNC: 140 MMOL/L (ref 136–145)
TOTAL PROTEIN: 7.2 G/DL (ref 6.4–8.2)

## 2019-01-15 ENCOUNTER — OFFICE VISIT (OUTPATIENT)
Dept: FAMILY MEDICINE CLINIC | Age: 74
End: 2019-01-15
Payer: MEDICARE

## 2019-01-15 VITALS
SYSTOLIC BLOOD PRESSURE: 130 MMHG | DIASTOLIC BLOOD PRESSURE: 82 MMHG | BODY MASS INDEX: 38.63 KG/M2 | WEIGHT: 239.2 LBS | HEART RATE: 60 BPM | TEMPERATURE: 98.4 F | OXYGEN SATURATION: 98 %

## 2019-01-15 DIAGNOSIS — E78.00 PURE HYPERCHOLESTEROLEMIA: ICD-10-CM

## 2019-01-15 DIAGNOSIS — D72.829 LEUKOCYTOSIS, UNSPECIFIED TYPE: ICD-10-CM

## 2019-01-15 DIAGNOSIS — M85.80 OSTEOPENIA, UNSPECIFIED LOCATION: ICD-10-CM

## 2019-01-15 DIAGNOSIS — M17.0 PRIMARY OSTEOARTHRITIS OF BOTH KNEES: ICD-10-CM

## 2019-01-15 DIAGNOSIS — M1A.0720 IDIOPATHIC CHRONIC GOUT OF LEFT FOOT WITHOUT TOPHUS: ICD-10-CM

## 2019-01-15 DIAGNOSIS — I10 ESSENTIAL HYPERTENSION: ICD-10-CM

## 2019-01-15 DIAGNOSIS — E11.8 TYPE 2 DIABETES MELLITUS WITH COMPLICATION, WITHOUT LONG-TERM CURRENT USE OF INSULIN (HCC): Primary | ICD-10-CM

## 2019-01-15 LAB — GLUCOSE BLD-MCNC: 126 MG/DL

## 2019-01-15 PROCEDURE — 82962 GLUCOSE BLOOD TEST: CPT | Performed by: FAMILY MEDICINE

## 2019-01-15 PROCEDURE — 99214 OFFICE O/P EST MOD 30 MIN: CPT | Performed by: FAMILY MEDICINE

## 2019-01-15 RX ORDER — GLIPIZIDE 5 MG/1
5 TABLET, FILM COATED, EXTENDED RELEASE ORAL DAILY
Qty: 30 TABLET | Refills: 0 | Status: SHIPPED | OUTPATIENT
Start: 2019-01-15 | End: 2019-04-12 | Stop reason: SDUPTHER

## 2019-01-15 RX ORDER — LISINOPRIL 10 MG/1
10 TABLET ORAL DAILY
Qty: 90 TABLET | Refills: 0 | Status: SHIPPED | OUTPATIENT
Start: 2019-01-15 | End: 2019-04-12 | Stop reason: SDUPTHER

## 2019-01-15 RX ORDER — INDOMETHACIN 50 MG/1
50 CAPSULE ORAL 2 TIMES DAILY WITH MEALS
Qty: 20 CAPSULE | Refills: 0 | Status: SHIPPED | OUTPATIENT
Start: 2019-01-15 | End: 2019-04-12 | Stop reason: SDUPTHER

## 2019-01-15 RX ORDER — PRAVASTATIN SODIUM 40 MG
40 TABLET ORAL DAILY
Qty: 90 TABLET | Refills: 0 | Status: SHIPPED | OUTPATIENT
Start: 2019-01-15 | End: 2019-04-12 | Stop reason: SDUPTHER

## 2019-01-15 RX ORDER — BISOPROLOL FUMARATE AND HYDROCHLOROTHIAZIDE 10; 6.25 MG/1; MG/1
1 TABLET ORAL DAILY
Qty: 90 TABLET | Refills: 0 | Status: SHIPPED | OUTPATIENT
Start: 2019-01-15 | End: 2019-04-12 | Stop reason: SDUPTHER

## 2019-01-15 RX ORDER — SERTRALINE HYDROCHLORIDE 100 MG/1
TABLET, FILM COATED ORAL
Qty: 135 TABLET | Refills: 0 | Status: SHIPPED | OUTPATIENT
Start: 2019-01-15 | End: 2019-04-12 | Stop reason: SDUPTHER

## 2019-01-15 ASSESSMENT — ENCOUNTER SYMPTOMS
CHEST TIGHTNESS: 0
ABDOMINAL PAIN: 0
SHORTNESS OF BREATH: 0
COUGH: 0
BLOOD IN STOOL: 0

## 2019-01-16 RX ORDER — GLIPIZIDE 5 MG/1
TABLET, FILM COATED, EXTENDED RELEASE ORAL
Qty: 90 TABLET | Refills: 0 | Status: SHIPPED | OUTPATIENT
Start: 2019-01-16 | End: 2020-01-06 | Stop reason: SDUPTHER

## 2019-03-20 ENCOUNTER — OFFICE VISIT (OUTPATIENT)
Dept: ORTHOPEDIC SURGERY | Age: 74
End: 2019-03-20
Payer: MEDICARE

## 2019-03-20 VITALS — BODY MASS INDEX: 38.44 KG/M2 | WEIGHT: 239.2 LBS | HEIGHT: 66 IN

## 2019-03-20 DIAGNOSIS — M17.0 PRIMARY OSTEOARTHRITIS OF BOTH KNEES: Primary | ICD-10-CM

## 2019-03-20 PROCEDURE — 99213 OFFICE O/P EST LOW 20 MIN: CPT | Performed by: ORTHOPAEDIC SURGERY

## 2019-04-09 ENCOUNTER — TELEPHONE (OUTPATIENT)
Dept: LAB | Age: 74
End: 2019-04-09

## 2019-04-09 DIAGNOSIS — E78.5 HYPERLIPIDEMIA, UNSPECIFIED HYPERLIPIDEMIA TYPE: Primary | ICD-10-CM

## 2019-04-09 DIAGNOSIS — E11.9 TYPE 2 DIABETES MELLITUS WITHOUT COMPLICATION, WITHOUT LONG-TERM CURRENT USE OF INSULIN (HCC): ICD-10-CM

## 2019-04-09 LAB
A/G RATIO: 1.7 (ref 1.1–2.2)
ALBUMIN SERPL-MCNC: 4.2 G/DL (ref 3.4–5)
ALP BLD-CCNC: 116 U/L (ref 40–129)
ALT SERPL-CCNC: 11 U/L (ref 10–40)
ANION GAP SERPL CALCULATED.3IONS-SCNC: 14 MMOL/L (ref 3–16)
AST SERPL-CCNC: 10 U/L (ref 15–37)
BASOPHILS ABSOLUTE: 0.1 K/UL (ref 0–0.2)
BASOPHILS RELATIVE PERCENT: 0.7 %
BILIRUB SERPL-MCNC: 0.3 MG/DL (ref 0–1)
BUN BLDV-MCNC: 30 MG/DL (ref 7–20)
CALCIUM SERPL-MCNC: 9.4 MG/DL (ref 8.3–10.6)
CHLORIDE BLD-SCNC: 103 MMOL/L (ref 99–110)
CHOLESTEROL, TOTAL: 173 MG/DL (ref 0–199)
CO2: 24 MMOL/L (ref 21–32)
CREAT SERPL-MCNC: 1.2 MG/DL (ref 0.6–1.2)
EOSINOPHILS ABSOLUTE: 0.2 K/UL (ref 0–0.6)
EOSINOPHILS RELATIVE PERCENT: 1.8 %
GFR AFRICAN AMERICAN: 53
GFR NON-AFRICAN AMERICAN: 44
GLOBULIN: 2.5 G/DL
GLUCOSE BLD-MCNC: 116 MG/DL (ref 70–99)
HCT VFR BLD CALC: 38.6 % (ref 36–48)
HDLC SERPL-MCNC: 55 MG/DL (ref 40–60)
HEMOGLOBIN: 12.7 G/DL (ref 12–16)
LDL CHOLESTEROL CALCULATED: 83 MG/DL
LYMPHOCYTES ABSOLUTE: 1.3 K/UL (ref 1–5.1)
LYMPHOCYTES RELATIVE PERCENT: 11 %
MCH RBC QN AUTO: 31.4 PG (ref 26–34)
MCHC RBC AUTO-ENTMCNC: 33 G/DL (ref 31–36)
MCV RBC AUTO: 95.3 FL (ref 80–100)
MONOCYTES ABSOLUTE: 0.5 K/UL (ref 0–1.3)
MONOCYTES RELATIVE PERCENT: 3.9 %
NEUTROPHILS ABSOLUTE: 10.1 K/UL (ref 1.7–7.7)
NEUTROPHILS RELATIVE PERCENT: 82.6 %
PDW BLD-RTO: 14.4 % (ref 12.4–15.4)
PLATELET # BLD: 268 K/UL (ref 135–450)
PMV BLD AUTO: 7.7 FL (ref 5–10.5)
POTASSIUM SERPL-SCNC: 4.9 MMOL/L (ref 3.5–5.1)
RBC # BLD: 4.05 M/UL (ref 4–5.2)
SODIUM BLD-SCNC: 141 MMOL/L (ref 136–145)
TOTAL CK: 29 U/L (ref 26–192)
TOTAL PROTEIN: 6.7 G/DL (ref 6.4–8.2)
TRIGL SERPL-MCNC: 175 MG/DL (ref 0–150)
VLDLC SERPL CALC-MCNC: 35 MG/DL
WBC # BLD: 12.2 K/UL (ref 4–11)

## 2019-04-10 LAB
ESTIMATED AVERAGE GLUCOSE: 122.6 MG/DL
HBA1C MFR BLD: 5.9 %

## 2019-04-12 ENCOUNTER — OFFICE VISIT (OUTPATIENT)
Dept: FAMILY MEDICINE CLINIC | Age: 74
End: 2019-04-12
Payer: MEDICARE

## 2019-04-12 VITALS
OXYGEN SATURATION: 97 % | RESPIRATION RATE: 14 BRPM | SYSTOLIC BLOOD PRESSURE: 120 MMHG | DIASTOLIC BLOOD PRESSURE: 78 MMHG | HEART RATE: 55 BPM | HEIGHT: 66 IN | BODY MASS INDEX: 37.93 KG/M2 | WEIGHT: 236 LBS

## 2019-04-12 DIAGNOSIS — I10 ESSENTIAL HYPERTENSION: ICD-10-CM

## 2019-04-12 DIAGNOSIS — M85.80 OSTEOPENIA, UNSPECIFIED LOCATION: ICD-10-CM

## 2019-04-12 DIAGNOSIS — M10.072 IDIOPATHIC GOUT INVOLVING TOE OF LEFT FOOT, UNSPECIFIED CHRONICITY: ICD-10-CM

## 2019-04-12 DIAGNOSIS — E11.8 TYPE 2 DIABETES MELLITUS WITH COMPLICATION, WITHOUT LONG-TERM CURRENT USE OF INSULIN (HCC): Primary | ICD-10-CM

## 2019-04-12 DIAGNOSIS — F32.A DEPRESSION, UNSPECIFIED DEPRESSION TYPE: ICD-10-CM

## 2019-04-12 DIAGNOSIS — E78.5 HYPERLIPIDEMIA, UNSPECIFIED HYPERLIPIDEMIA TYPE: ICD-10-CM

## 2019-04-12 DIAGNOSIS — D72.829 LEUKOCYTOSIS, UNSPECIFIED TYPE: ICD-10-CM

## 2019-04-12 LAB
BILIRUBIN, POC: NORMAL
BLOOD URINE, POC: NORMAL
CHP ED QC CHECK: NORMAL
CLARITY, POC: CLEAR
COLOR, POC: YELLOW
GLUCOSE BLD-MCNC: 101 MG/DL
GLUCOSE URINE, POC: NORMAL
KETONES, POC: NORMAL
LEUKOCYTE EST, POC: NORMAL
NITRITE, POC: NORMAL
PH, POC: 5.5
PROTEIN, POC: NORMAL
SPECIFIC GRAVITY, POC: 1.03
UROBILINOGEN, POC: 0.2

## 2019-04-12 PROCEDURE — 82962 GLUCOSE BLOOD TEST: CPT | Performed by: FAMILY MEDICINE

## 2019-04-12 PROCEDURE — 99214 OFFICE O/P EST MOD 30 MIN: CPT | Performed by: FAMILY MEDICINE

## 2019-04-12 PROCEDURE — 81002 URINALYSIS NONAUTO W/O SCOPE: CPT | Performed by: FAMILY MEDICINE

## 2019-04-12 RX ORDER — LISINOPRIL 10 MG/1
10 TABLET ORAL DAILY
Qty: 90 TABLET | Refills: 0 | Status: SHIPPED | OUTPATIENT
Start: 2019-04-12 | End: 2019-07-12 | Stop reason: SDUPTHER

## 2019-04-12 RX ORDER — PRAVASTATIN SODIUM 40 MG
40 TABLET ORAL DAILY
Qty: 90 TABLET | Refills: 0 | Status: SHIPPED | OUTPATIENT
Start: 2019-04-12 | End: 2019-07-12 | Stop reason: SDUPTHER

## 2019-04-12 RX ORDER — SERTRALINE HYDROCHLORIDE 100 MG/1
TABLET, FILM COATED ORAL
Qty: 135 TABLET | Refills: 0 | Status: SHIPPED | OUTPATIENT
Start: 2019-04-12 | End: 2019-07-12 | Stop reason: SDUPTHER

## 2019-04-12 RX ORDER — GLIPIZIDE 5 MG/1
5 TABLET, FILM COATED, EXTENDED RELEASE ORAL DAILY
Qty: 90 TABLET | Refills: 0 | Status: SHIPPED | OUTPATIENT
Start: 2019-04-12 | End: 2019-07-12 | Stop reason: SDUPTHER

## 2019-04-12 RX ORDER — INDOMETHACIN 50 MG/1
50 CAPSULE ORAL 2 TIMES DAILY WITH MEALS
Qty: 20 CAPSULE | Refills: 0 | Status: SHIPPED | OUTPATIENT
Start: 2019-04-12 | End: 2019-07-12 | Stop reason: SDUPTHER

## 2019-04-12 RX ORDER — BISOPROLOL FUMARATE AND HYDROCHLOROTHIAZIDE 10; 6.25 MG/1; MG/1
1 TABLET ORAL DAILY
Qty: 90 TABLET | Refills: 0 | Status: SHIPPED | OUTPATIENT
Start: 2019-04-12 | End: 2019-07-12 | Stop reason: SDUPTHER

## 2019-04-12 ASSESSMENT — ENCOUNTER SYMPTOMS
CHEST TIGHTNESS: 0
BLOOD IN STOOL: 0
SHORTNESS OF BREATH: 0
ABDOMINAL PAIN: 0
COUGH: 0

## 2019-04-12 NOTE — PROGRESS NOTES
Tobacco Use    Smoking status: Never Smoker    Smokeless tobacco: Never Used   Substance and Sexual Activity    Alcohol use: No     Alcohol/week: 0.0 oz    Drug use: No    Sexual activity: Not on file   Lifestyle    Physical activity:     Days per week: Not on file     Minutes per session: Not on file    Stress: Not on file   Relationships    Social connections:     Talks on phone: Not on file     Gets together: Not on file     Attends Gnosticist service: Not on file     Active member of club or organization: Not on file     Attends meetings of clubs or organizations: Not on file     Relationship status: Not on file    Intimate partner violence:     Fear of current or ex partner: Not on file     Emotionally abused: Not on file     Physically abused: Not on file     Forced sexual activity: Not on file   Other Topics Concern    Not on file   Social History Narrative    Not on file     Family History   Problem Relation Age of Onset    Heart Disease Mother     Alcohol Abuse Mother     Migraines Mother     Heart Disease Father     Stroke Father     Hypertension Father     Alcohol Abuse Other     Migraines Other         Review of Systems   Constitutional: Negative for activity change, appetite change and unexpected weight change. Respiratory: Negative for cough, chest tightness and shortness of breath. Cardiovascular: Negative for chest pain, palpitations and leg swelling. Gastrointestinal: Negative for abdominal pain and blood in stool. Musculoskeletal: Negative for arthralgias and myalgias. Skin: Negative for rash. Neurological: Negative for light-headedness and headaches. Hematological: Negative for adenopathy. Does not bruise/bleed easily. Psychiatric/Behavioral: Negative for dysphoric mood, sleep disturbance and suicidal ideas. The patient is not nervous/anxious.         OBJECTIVE:  /78   Pulse 55   Resp 14   Ht 5' 6\" (1.676 m)   Wt 236 lb (107 kg)   SpO2 97%   BMI 38.09 kg/m²   Physical Exam   Constitutional: She is oriented to person, place, and time. She appears well-developed and well-nourished. Eyes: Pupils are equal, round, and reactive to light. EOM are normal.   Neck: Normal range of motion. Neck supple. No JVD present. Cardiovascular: Normal rate and regular rhythm. Pulmonary/Chest: Effort normal and breath sounds normal.   Abdominal: Soft. Bowel sounds are normal. There is no tenderness. Musculoskeletal:   Feet-no lesions   Neurological: She is alert and oriented to person, place, and time. No cranial nerve deficit. Skin: No rash noted. Psychiatric: She has a normal mood and affect. Her behavior is normal. Thought content normal.       ASSESSMENT/PLAN:    1. Type 2 diabetes mellitus with complication, without long-term current use of insulin (HCC)  Continue current mgt  appr diet  weight loss and increase physical activity advised  - POCT Urinalysis no Micro  - POCT Glucose  - Diabetic Foot Exam    2. Hyperlipidemia, unspecified hyperlipidemia type  Refill Pravachol  Diet mgt  Labs reviewed    3. Essential hypertension  Refill meds  Intermittent ambulatory Bp checks    4. Leukocytosis, unspecified type  Stable. will continue to monitor    5. Idiopathic gout involving toe of left foot, unspecified chronicity  Gout precautions  Prn Indocin    6. Osteopenia, unspecified location  Increase dietary ca and daily Vit d3    7. Depression, unspecified depression type  Refill Zoloft  Stress mgt      Orders Placed This Encounter   Procedures    POCT Urinalysis no Micro    POCT Glucose    Diabetic Foot Exam     Current Outpatient Medications   Medication Sig Dispense Refill    bevacizumab (AVASTIN) 100 MG/4ML chemo injection inject 0.05mL intraocularly in affected eye.       bisoprolol-hydrochlorothiazide (ZIAC) 10-6.25 MG per tablet Take 1 tablet by mouth daily 90 tablet 0    diclofenac sodium (VOLTAREN) 1 % GEL Apply 4 g topically 3 times daily as needed for Pain (knee pain) 1 Tube 1    glipiZIDE (GLUCOTROL XL) 5 MG extended release tablet Take 1 tablet by mouth daily 90 tablet 0    indomethacin (INDOCIN) 50 MG capsule Take 1 capsule by mouth 2 times daily (with meals) 20 capsule 0    lisinopril (PRINIVIL) 10 MG tablet Take 1 tablet by mouth daily 90 tablet 0    pravastatin (PRAVACHOL) 40 MG tablet Take 1 tablet by mouth daily 90 tablet 0    sertraline (ZOLOFT) 100 MG tablet 1 1/2 po qd 135 tablet 0    SITagliptin (JANUVIA) 100 MG tablet Take 1 tablet by mouth daily 90 tablet 0    glipiZIDE (GLUCOTROL XL) 5 MG extended release tablet TAKE ONE TABLET BY MOUTH DAILY 90 tablet 0    Handicap Placard MISC by Does not apply route Osteoarthritis, knee pain  1/2019-1/24 1 each 0    penicillin v potassium (VEETID) 500 MG tablet       Misc. Devices (WALKER) MISC 1 each by Does not apply route daily. Dispense and Fit for injury to left lower extremity and weakness. 1 each 0    aspirin 325 MG tablet Take 325 mg by mouth daily. No current facility-administered medications for this visit. Return in about 3 months (around 7/12/2019), or if symptoms worsen or fail to improve.     Jameel Pugh MD

## 2019-05-08 ENCOUNTER — OFFICE VISIT (OUTPATIENT)
Dept: FAMILY MEDICINE CLINIC | Age: 74
End: 2019-05-08
Payer: MEDICARE

## 2019-05-08 ENCOUNTER — HOSPITAL ENCOUNTER (OUTPATIENT)
Age: 74
Discharge: HOME | End: 2019-05-08
Payer: MEDICARE

## 2019-05-08 ENCOUNTER — HOSPITAL ENCOUNTER (OUTPATIENT)
Dept: GENERAL RADIOLOGY | Age: 74
Discharge: HOME | End: 2019-05-08
Payer: MEDICARE

## 2019-05-08 VITALS
HEART RATE: 57 BPM | OXYGEN SATURATION: 96 % | DIASTOLIC BLOOD PRESSURE: 78 MMHG | TEMPERATURE: 98.6 F | WEIGHT: 236 LBS | RESPIRATION RATE: 18 BRPM | BODY MASS INDEX: 38.09 KG/M2 | SYSTOLIC BLOOD PRESSURE: 128 MMHG

## 2019-05-08 DIAGNOSIS — S05.10XA ECCHYMOSIS OF EYE, INITIAL ENCOUNTER: ICD-10-CM
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
## 2019-05-08 DIAGNOSIS — S01.81XA LACERATION OF TEMPLE, INITIAL ENCOUNTER: ICD-10-CM

## 2019-05-08 DIAGNOSIS — E11.8 TYPE 2 DIABETES MELLITUS WITH COMPLICATION, WITHOUT LONG-TERM CURRENT USE OF INSULIN (HCC): ICD-10-CM

## 2019-05-08 PROCEDURE — 70150 X-RAY EXAM OF FACIAL BONES: CPT

## 2019-05-08 PROCEDURE — 99214 OFFICE O/P EST MOD 30 MIN: CPT | Performed by: FAMILY MEDICINE

## 2019-05-08 ASSESSMENT — ENCOUNTER SYMPTOMS
COUGH: 0
CHEST TIGHTNESS: 0
ABDOMINAL PAIN: 0
SINUS PRESSURE: 0
SHORTNESS OF BREATH: 0

## 2019-05-08 NOTE — PROGRESS NOTES
SUBJECTIVE:  Shravan Sanchez   1945   female   No Known Allergies    Chief Complaint   Patient presents with    Fall        Patient Active Problem List    Diagnosis Date Noted    Primary osteoarthritis of left knee 01/02/2019    Idiopathic chronic gout of left foot without tophus 10/31/2017    Bilateral hearing loss 05/05/2017    Type 2 diabetes mellitus with complication, without long-term current use of insulin ([facility] [address] 08/07/2016    Leukocytosis 06/18/2015    Depression 03/04/2015    Ventral hernia 06/20/2014    Morbid obesity ([facility] [address] 06/20/2014    Macular degeneration 06/20/2014    Hypertension     Osteopenia     Squamous acanthoma of face     Hyperlipidemia     Herpes dermatitis        HPI   Pt with hx of diabetes is here today with swelling and bruising around eye from a fall. Reports 2 d ago she had rolled over in bed while sleep and fell out of bed. She had hit the nightstand at the start of her fall and woke then landing sitting on the floor. She has a cut on right side of eye/temple. Denies LOC, N,V,ha, vision change, weakness/tingling, poor coordination, depressed mood or irritability. No change in vision or pain with moving eye. Denies congestion or problems with breathing.    Past Medical History:   Diagnosis Date    Depression     Herpes dermatitis     left thigh    Hyperlipidemia     Hypertension     Obesity     Osteopenia     Squamous acanthoma of face     Type II or unspecified type diabetes mellitus without mention of complication, not stated as uncontrolled      Social History     Socioeconomic History    Marital status:      Spouse name: Not on file    Number of children: Not on file    Years of education: Not on file    Highest education level: Not on file   Occupational History    Not on file   Social Needs    Financial resource strain: Not on file    Food insecurity:     Worry: Not on file     Inability: Not on file    Transportation needs:     Medical: Not on file     Non-medical: Not on file   Tobacco Use    Smoking status: Never Smoker    Smokeless tobacco: Never Used   Substance and Sexual Activity    Alcohol use: No     Alcohol/week: 0.0 oz    Drug use: No    Sexual activity: Not on file   Lifestyle    Physical activity:     Days per week: Not on file     Minutes per session: Not on file    Stress: Not on file   Relationships    Social connections:     Talks on phone: Not on file     Gets together: Not on file     Attends [employer] service: Not on file     Active member of club or organization: Not on file     Attends meetings of clubs or organizations: Not on file     Relationship status: Not on file    Intimate partner violence:     Fear of current or ex partner: Not on file     Emotionally abused: Not on file     Physically abused: Not on file     Forced sexual activity: Not on file   Other Topics Concern    Not on file   Social History Narrative    Not on file     Family History   Problem Relation Age of Onset    Heart Disease Mother     Alcohol Abuse Mother     Migraines Mother     Heart Disease Father     Stroke Father     Hypertension Father     Alcohol Abuse Other     Migraines Other         Review of Systems   Constitutional: Negative for activity change, appetite change, fatigue and unexpected weight change. HENT: Negative for congestion and sinus pressure. Respiratory: Negative for cough, chest tightness and shortness of breath. Cardiovascular: Negative for chest pain. Gastrointestinal: Negative for abdominal pain. Endocrine: Negative for cold intolerance and heat intolerance. Musculoskeletal: Negative for arthralgias and myalgias. Skin: Negative for rash. Neurological: Negative for dizziness, seizures, syncope, facial asymmetry, speech difficulty, weakness, light-headedness, numbness and headaches. Hematological: Negative for adenopathy. Does not bruise/bleed easily.    Psychiatric/Behavioral: Negative for dysphoric mood. The patient is not nervous/anxious. OBJECTIVE:  /78 (Site: Right Upper Arm, Position: Sitting, Cuff Size: Medium Adult)   Pulse 57   Temp 98.6 °F (37 °C) (Oral)   Resp 18   Wt 236 lb (107 kg)   SpO2 96%   BMI 38.09 kg/m²   Physical Exam   Constitutional: She is oriented to person, place, and time. She appears well-developed and well-nourished. HENT:   Right Ear: External ear normal.   Left Ear: External ear normal.   Nose: Nose normal.   Mouth/Throat: Oropharynx is clear and moist.   No gross abnormality of facial bones or pain with palpation. some tenderness under right eye   Eyes: Pupils are equal, round, and reactive to light. Conjunctivae and EOM are normal.   Neck: Normal range of motion. Neck supple. No thyromegaly present. Cardiovascular: Normal rate and regular rhythm. Pulmonary/Chest: Effort normal and breath sounds normal.   Abdominal: Soft. Bowel sounds are normal. There is no tenderness. Lymphadenopathy:     She has no cervical adenopathy. Neurological: She is alert and oriented to person, place, and time. She displays normal reflexes. No cranial nerve deficit or sensory deficit. She exhibits normal muscle tone. Coordination normal.   Skin: No rash noted. There is bruising around right eye. There is a small (2-3 mm) healing laceration right temple   Psychiatric: She has a normal mood and affect. Her behavior is normal. Thought content normal.   Nursing note and vitals reviewed. ASSESSMENT/PLAN:    1. Fall, initial encounter  Ice  Facial xrays  SAC score 0  To ER if ha,change in vision, or any neurologic sx  - XR FACIAL BONES (MIN 3 VIEWS ); Future    2. Ecchymosis of eye, initial encounter  Ice, facial xrays  Pt advised to call if change in vision, pain with eye movement    3. Laceration of temple, initial encounter  Neosporin, keep clean    4.  Type 2 diabetes mellitus with complication, without long-term current use of insulin ([address]  continue current

## 2019-07-01 ENCOUNTER — TELEPHONE (OUTPATIENT)
Dept: FAMILY MEDICINE CLINIC | Age: 74
End: 2019-07-01

## 2019-07-01 DIAGNOSIS — E11.9 TYPE 2 DIABETES MELLITUS WITHOUT COMPLICATION, WITHOUT LONG-TERM CURRENT USE OF INSULIN (HCC): Primary | ICD-10-CM

## 2019-07-01 DIAGNOSIS — E78.5 HYPERLIPIDEMIA, UNSPECIFIED HYPERLIPIDEMIA TYPE: ICD-10-CM

## 2019-07-01 LAB
A/G RATIO: 1.8 (ref 1.1–2.2)
ALBUMIN SERPL-MCNC: 4.6 G/DL (ref 3.4–5)
ALP BLD-CCNC: 117 U/L (ref 40–129)
ALT SERPL-CCNC: 13 U/L (ref 10–40)
ANION GAP SERPL CALCULATED.3IONS-SCNC: 23 MMOL/L (ref 3–16)
AST SERPL-CCNC: 14 U/L (ref 15–37)
BASOPHILS ABSOLUTE: 0.1 K/UL (ref 0–0.2)
BASOPHILS RELATIVE PERCENT: 0.6 %
BILIRUB SERPL-MCNC: 0.4 MG/DL (ref 0–1)
BUN BLDV-MCNC: 33 MG/DL (ref 7–20)
CALCIUM SERPL-MCNC: 10.1 MG/DL (ref 8.3–10.6)
CHLORIDE BLD-SCNC: 105 MMOL/L (ref 99–110)
CHOLESTEROL, TOTAL: 171 MG/DL (ref 0–199)
CO2: 21 MMOL/L (ref 21–32)
CREAT SERPL-MCNC: 1.4 MG/DL (ref 0.6–1.2)
EOSINOPHILS ABSOLUTE: 0.2 K/UL (ref 0–0.6)
EOSINOPHILS RELATIVE PERCENT: 1.7 %
GFR AFRICAN AMERICAN: 44
GFR NON-AFRICAN AMERICAN: 37
GLOBULIN: 2.6 G/DL
GLUCOSE BLD-MCNC: 116 MG/DL (ref 70–99)
HCT VFR BLD CALC: 37.5 % (ref 36–48)
HDLC SERPL-MCNC: 52 MG/DL (ref 40–60)
HEMOGLOBIN: 12.8 G/DL (ref 12–16)
LDL CHOLESTEROL CALCULATED: 72 MG/DL
LYMPHOCYTES ABSOLUTE: 1.3 K/UL (ref 1–5.1)
LYMPHOCYTES RELATIVE PERCENT: 10.9 %
MCH RBC QN AUTO: 32.7 PG (ref 26–34)
MCHC RBC AUTO-ENTMCNC: 34.1 G/DL (ref 31–36)
MCV RBC AUTO: 95.6 FL (ref 80–100)
MONOCYTES ABSOLUTE: 0.6 K/UL (ref 0–1.3)
MONOCYTES RELATIVE PERCENT: 5.1 %
NEUTROPHILS ABSOLUTE: 9.5 K/UL (ref 1.7–7.7)
NEUTROPHILS RELATIVE PERCENT: 81.7 %
PDW BLD-RTO: 14.3 % (ref 12.4–15.4)
PLATELET # BLD: 271 K/UL (ref 135–450)
PMV BLD AUTO: 7.6 FL (ref 5–10.5)
POTASSIUM SERPL-SCNC: 5.6 MMOL/L (ref 3.5–5.1)
RBC # BLD: 3.92 M/UL (ref 4–5.2)
SODIUM BLD-SCNC: 149 MMOL/L (ref 136–145)
TOTAL PROTEIN: 7.2 G/DL (ref 6.4–8.2)
TRIGL SERPL-MCNC: 237 MG/DL (ref 0–150)
VLDLC SERPL CALC-MCNC: 47 MG/DL
WBC # BLD: 11.7 K/UL (ref 4–11)

## 2019-07-02 LAB
ESTIMATED AVERAGE GLUCOSE: 119.8 MG/DL
HBA1C MFR BLD: 5.8 %
TOTAL CK: 49 U/L (ref 26–192)

## 2019-07-03 ENCOUNTER — OFFICE VISIT (OUTPATIENT)
Dept: ORTHOPEDIC SURGERY | Age: 74
End: 2019-07-03
Payer: MEDICARE

## 2019-07-03 VITALS — BODY MASS INDEX: 37.91 KG/M2 | WEIGHT: 235.89 LBS | HEIGHT: 66 IN

## 2019-07-03 DIAGNOSIS — M17.0 PRIMARY OSTEOARTHRITIS OF BOTH KNEES: Primary | ICD-10-CM

## 2019-07-03 PROCEDURE — 20610 DRAIN/INJ JOINT/BURSA W/O US: CPT | Performed by: ORTHOPAEDIC SURGERY

## 2019-07-03 PROCEDURE — 99213 OFFICE O/P EST LOW 20 MIN: CPT | Performed by: ORTHOPAEDIC SURGERY

## 2019-07-12 ENCOUNTER — OFFICE VISIT (OUTPATIENT)
Dept: FAMILY MEDICINE CLINIC | Age: 74
End: 2019-07-12
Payer: MEDICARE

## 2019-07-12 VITALS
SYSTOLIC BLOOD PRESSURE: 128 MMHG | OXYGEN SATURATION: 98 % | RESPIRATION RATE: 18 BRPM | DIASTOLIC BLOOD PRESSURE: 84 MMHG | HEART RATE: 58 BPM | WEIGHT: 235.2 LBS | TEMPERATURE: 98.2 F | BODY MASS INDEX: 37.98 KG/M2

## 2019-07-12 DIAGNOSIS — I10 ESSENTIAL HYPERTENSION: ICD-10-CM

## 2019-07-12 DIAGNOSIS — M17.0 PRIMARY OSTEOARTHRITIS OF BOTH KNEES: ICD-10-CM

## 2019-07-12 DIAGNOSIS — M10.072 IDIOPATHIC GOUT INVOLVING TOE OF LEFT FOOT, UNSPECIFIED CHRONICITY: ICD-10-CM

## 2019-07-12 DIAGNOSIS — E11.9 TYPE 2 DIABETES MELLITUS WITHOUT COMPLICATION, WITHOUT LONG-TERM CURRENT USE OF INSULIN (HCC): Primary | ICD-10-CM

## 2019-07-12 DIAGNOSIS — F32.A DEPRESSION, UNSPECIFIED DEPRESSION TYPE: ICD-10-CM

## 2019-07-12 DIAGNOSIS — E78.5 HYPERLIPIDEMIA, UNSPECIFIED HYPERLIPIDEMIA TYPE: ICD-10-CM

## 2019-07-12 DIAGNOSIS — D72.829 LEUKOCYTOSIS, UNSPECIFIED TYPE: ICD-10-CM

## 2019-07-12 LAB
BILIRUBIN, POC: NORMAL
BLOOD URINE, POC: NORMAL
CHP ED QC CHECK: NORMAL
CLARITY, POC: NORMAL
COLOR, POC: YELLOW
GLUCOSE BLD-MCNC: 77 MG/DL
GLUCOSE URINE, POC: NORMAL
KETONES, POC: NORMAL
LEUKOCYTE EST, POC: NORMAL
NITRITE, POC: NORMAL
PH, POC: 5.5
PROTEIN, POC: NORMAL
SPECIFIC GRAVITY, POC: 1.02
UROBILINOGEN, POC: 0.2

## 2019-07-12 PROCEDURE — 99214 OFFICE O/P EST MOD 30 MIN: CPT | Performed by: FAMILY MEDICINE

## 2019-07-12 PROCEDURE — 81002 URINALYSIS NONAUTO W/O SCOPE: CPT | Performed by: FAMILY MEDICINE

## 2019-07-12 PROCEDURE — 82962 GLUCOSE BLOOD TEST: CPT | Performed by: FAMILY MEDICINE

## 2019-07-12 RX ORDER — INDOMETHACIN 50 MG/1
50 CAPSULE ORAL 2 TIMES DAILY WITH MEALS
Qty: 20 CAPSULE | Refills: 0 | Status: SHIPPED | OUTPATIENT
Start: 2019-07-12

## 2019-07-12 RX ORDER — BISOPROLOL FUMARATE AND HYDROCHLOROTHIAZIDE 10; 6.25 MG/1; MG/1
1 TABLET ORAL DAILY
Qty: 90 TABLET | Refills: 0 | Status: SHIPPED | OUTPATIENT
Start: 2019-07-12 | End: 2019-10-04 | Stop reason: SDUPTHER

## 2019-07-12 RX ORDER — SERTRALINE HYDROCHLORIDE 100 MG/1
TABLET, FILM COATED ORAL
Qty: 135 TABLET | Refills: 0 | Status: SHIPPED | OUTPATIENT
Start: 2019-07-12 | End: 2019-10-04 | Stop reason: SDUPTHER

## 2019-07-12 RX ORDER — GLIPIZIDE 5 MG/1
5 TABLET, FILM COATED, EXTENDED RELEASE ORAL DAILY
Qty: 90 TABLET | Refills: 0 | Status: SHIPPED | OUTPATIENT
Start: 2019-07-12 | End: 2019-10-04 | Stop reason: SDUPTHER

## 2019-07-12 RX ORDER — PRAVASTATIN SODIUM 40 MG
40 TABLET ORAL DAILY
Qty: 90 TABLET | Refills: 0 | Status: SHIPPED | OUTPATIENT
Start: 2019-07-12 | End: 2019-10-04 | Stop reason: SDUPTHER

## 2019-07-12 RX ORDER — LISINOPRIL 10 MG/1
10 TABLET ORAL DAILY
Qty: 90 TABLET | Refills: 0 | Status: SHIPPED | OUTPATIENT
Start: 2019-07-12 | End: 2019-10-04 | Stop reason: SDUPTHER

## 2019-07-14 ASSESSMENT — ENCOUNTER SYMPTOMS
SHORTNESS OF BREATH: 0
COUGH: 0
BLOOD IN STOOL: 0
CHEST TIGHTNESS: 0
ABDOMINAL PAIN: 0

## 2019-07-14 NOTE — PROGRESS NOTES
for adenopathy. Does not bruise/bleed easily. Psychiatric/Behavioral: Negative for dysphoric mood, sleep disturbance and suicidal ideas. The patient is not nervous/anxious. OBJECTIVE:  /84 (Site: Right Upper Arm, Position: Sitting, Cuff Size: Medium Adult)   Pulse 58   Temp 98.2 °F (36.8 °C) (Oral)   Resp 18   Wt 235 lb 3.2 oz (106.7 kg)   SpO2 98%   BMI 37.98 kg/m²   Physical Exam   Constitutional: She is oriented to person, place, and time. She appears well-developed and well-nourished. Eyes: Pupils are equal, round, and reactive to light. EOM are normal.   Neck: Normal range of motion. Neck supple. No JVD present. No thyromegaly present. Cardiovascular: Normal rate and regular rhythm. Pulmonary/Chest: Effort normal and breath sounds normal.   Abdominal: Soft. Bowel sounds are normal. There is no tenderness. Neurological: She is alert and oriented to person, place, and time. Skin: No rash noted. Psychiatric: She has a normal mood and affect. Her behavior is normal. Thought content normal.   Nursing note and vitals reviewed. ASSESSMENT/PLAN:    1. Type 2 diabetes mellitus without complication, without long-term current use of insulin (HCC)  Refill meds  Reviewed labs  Encouraged appr diet mgt  - POCT Glucose  - POCT Urinalysis no Micro  - Comprehensive Metabolic Panel; Future    2. Hyperlipidemia, unspecified hyperlipidemia type  Refill Pravachol  Reviewed last labs  Discussed appr diet mgt    3. Essential hypertension  Refill meds  Intermittent ambulatory BP checks    4. Primary osteoarthritis of both knees  Weight mgt  Reviewed fu and recommendations per ortho  Voltaren gel prn    5. Leukocytosis, unspecified type  Reviewed labs  will continue to monitor    6. Idiopathic gout involving toe of left foot, unspecified chronicity  Gout precautions  Prn Indocin  Prophylactic tx discussed if needed    7.  Depression, unspecified depression type  Stress mgt  Refill Zoloft      Orders Placed This Encounter   Procedures    Comprehensive Metabolic Panel     Standing Status:   Future     Standing Expiration Date:   7/31/2020    POCT Glucose    POCT Urinalysis no Micro     Current Outpatient Medications   Medication Sig Dispense Refill    SITagliptin (JANUVIA) 100 MG tablet Take 1 tablet by mouth daily 90 tablet 0    sertraline (ZOLOFT) 100 MG tablet 1 1/2 po qd 135 tablet 0    pravastatin (PRAVACHOL) 40 MG tablet Take 1 tablet by mouth daily 90 tablet 0    lisinopril (PRINIVIL) 10 MG tablet Take 1 tablet by mouth daily 90 tablet 0    indomethacin (INDOCIN) 50 MG capsule Take 1 capsule by mouth 2 times daily (with meals) 20 capsule 0    bisoprolol-hydrochlorothiazide (ZIAC) 10-6.25 MG per tablet Take 1 tablet by mouth daily 90 tablet 0    diclofenac sodium (VOLTAREN) 1 % GEL Apply 4 g topically 3 times daily as needed for Pain (knee pain) 1 Tube 1    glipiZIDE (GLUCOTROL XL) 5 MG extended release tablet Take 1 tablet by mouth daily 90 tablet 0    bevacizumab (AVASTIN) 100 MG/4ML chemo injection inject 0.05mL intraocularly in affected eye.  glipiZIDE (GLUCOTROL XL) 5 MG extended release tablet TAKE ONE TABLET BY MOUTH DAILY 90 tablet 0    Handicap Placard MISC by Does not apply route Osteoarthritis, knee pain  1/2019-1/24 1 each 0    penicillin v potassium (VEETID) 500 MG tablet       Misc. Devices (WALKER) MISC 1 each by Does not apply route daily. Dispense and Fit for injury to left lower extremity and weakness. 1 each 0    aspirin 325 MG tablet Take 325 mg by mouth daily. No current facility-administered medications for this visit. mammogram advised   Shingrix advised  Return in about 3 months (around 10/12/2019), or if symptoms worsen or fail to improve.     Refugio Kaiser MD

## 2019-07-15 DIAGNOSIS — E11.9 TYPE 2 DIABETES MELLITUS WITHOUT COMPLICATION, WITHOUT LONG-TERM CURRENT USE OF INSULIN (HCC): ICD-10-CM

## 2019-07-15 LAB
A/G RATIO: 1.9 (ref 1.1–2.2)
ALBUMIN SERPL-MCNC: 4.6 G/DL (ref 3.4–5)
ALP BLD-CCNC: 122 U/L (ref 40–129)
ALT SERPL-CCNC: 10 U/L (ref 10–40)
ANION GAP SERPL CALCULATED.3IONS-SCNC: 13 MMOL/L (ref 3–16)
AST SERPL-CCNC: 11 U/L (ref 15–37)
BILIRUB SERPL-MCNC: 0.4 MG/DL (ref 0–1)
BUN BLDV-MCNC: 35 MG/DL (ref 7–20)
CALCIUM SERPL-MCNC: 9.7 MG/DL (ref 8.3–10.6)
CHLORIDE BLD-SCNC: 103 MMOL/L (ref 99–110)
CO2: 24 MMOL/L (ref 21–32)
CREAT SERPL-MCNC: 1.4 MG/DL (ref 0.6–1.2)
GFR AFRICAN AMERICAN: 44
GFR NON-AFRICAN AMERICAN: 37
GLOBULIN: 2.4 G/DL
GLUCOSE BLD-MCNC: 106 MG/DL (ref 70–99)
POTASSIUM SERPL-SCNC: 5.3 MMOL/L (ref 3.5–5.1)
SODIUM BLD-SCNC: 140 MMOL/L (ref 136–145)
TOTAL PROTEIN: 7 G/DL (ref 6.4–8.2)

## 2019-08-07 ENCOUNTER — OFFICE VISIT (OUTPATIENT)
Dept: ORTHOPEDIC SURGERY | Age: 74
End: 2019-08-07
Payer: MEDICARE

## 2019-08-07 VITALS — BODY MASS INDEX: 37.8 KG/M2 | HEIGHT: 66 IN | WEIGHT: 235.23 LBS

## 2019-08-07 DIAGNOSIS — M17.11 PRIMARY OSTEOARTHRITIS OF RIGHT KNEE: Primary | ICD-10-CM

## 2019-08-07 PROCEDURE — 99213 OFFICE O/P EST LOW 20 MIN: CPT | Performed by: ORTHOPAEDIC SURGERY

## 2019-08-07 NOTE — PROGRESS NOTES
Examinations:         Left Lower Extremity: Examination of the left lower extremity does not show any tenderness, deformity or injury. Range of motion is unremarkable. There is no gross instability. There are no rashes, ulcerations or lesions. Strength and tone are normal.    Radiology:     X-rays obtained and reviewed in office:  Views 4 views of the right knee does demonstrate end-stage varus pattern osteoarthritis with complete loss of medial joint space as well as joint line sclerosis and osteophyte formation. Assessment : Right knee end-stage osteoarthritis    Impression:  Encounter Diagnosis   Name Primary?  Primary osteoarthritis of right knee Yes       Office Procedures:  Orders Placed This Encounter   Procedures    XR KNEE RIGHT (MIN 4 VIEWS)     Standing Status:   Future     Number of Occurrences:   1     Standing Expiration Date:   8/7/2020       Treatment Plan: I discussed the diagnosis and treatment options with her today. She has failed nonoperative management with injection therapy, physical therapy, modification of activities and anti-inflammatories. She does continue to have significant right knee pain is affecting her quality of life. I would recommend at this time right total knee arthroplasty. We discussed risks associated with the surgery including not limited to the risk of infection, nerve damage, blood loss, knee stiffness, RSD, DVT and the need for further surgery in the future. Despite these risks she did give informed consent. She is aware the rehabilitative process is involved with recovery from the surgery.   We will see her back at the time of right total knee arthroplasty

## 2019-08-13 ENCOUNTER — HOSPITAL ENCOUNTER (OUTPATIENT)
Dept: PHYSICAL THERAPY | Age: 74
Setting detail: THERAPIES SERIES
Discharge: HOME | End: 2019-08-13
Payer: MEDICARE

## 2019-08-13 PROCEDURE — 97530 THERAPEUTIC ACTIVITIES: CPT

## 2019-08-13 PROCEDURE — 97110 THERAPEUTIC EXERCISES: CPT

## 2019-08-13 PROCEDURE — 97161 PT EVAL LOW COMPLEX 20 MIN: CPT

## 2019-08-13 NOTE — PLAN OF CARE
and/or measurable assessment of functional outcome. [x] EVAL (LOW) 84196 (typically 30 minutes face-to-face)  [] EVAL (MOD) 26402 (typically 30 minutes face-to-face)  [] EVAL (HIGH) 35745 (typically 45 minutes face-to-face)  [] RE-EVAL     PLAN:   Frequency/Duration:  1-2 days per week for 12 Weeks:  Interventions:  [x]  Therapeutic exercise including: strength training, ROM, for Lower extremity and core   [x]  NMR activation and proprioception for LE, Glutes and Core   [x]  Manual therapy as indicated for LE, Hip and spine to include: Dry Needling/IASTM, STM, PROM, Gr I-IV mobilizations, manipulation. [x] Modalities as needed that may include: thermal agents, E-stim, Biofeedback, US, iontophoresis as indicated  [x] Patient education on joint protection, postural re-education, activity modification, progression of HEP. HEP instruction: (see scanned forms)    GOALS:  Patient stated goal: \"walk without a cane\"     Therapist goals for Patient:   Short Term Goals: To be achieved in: 2 weeks  1. Independent in HEP and progression per patient tolerance, in order to prevent re-injury. 2. Patient will have a decrease in pain to facilitate improvement in movement, function, and ADLs as indicated by Functional Deficits. Long Term Goals: To be achieved in: 12 weeks  1. Disability index score of 40% or less for the LEFS to assist with reaching prior level of function. 2. Patient will demonstrate increased AROM to [employer] to allow for proper joint functioning as indicated by patients Functional Deficits. 3. Patient will demonstrate an increase in Strength to good proximal hip strength and control to 5/5 in LE to allow for proper functional mobility as indicated by patients Functional Deficits. 4. Patient will return to all functional activities without increased symptoms or restriction. 5. Pt will ambulate with LRAD. Electronically signed by:   Kristie Perez PT

## 2019-08-22 ENCOUNTER — HOSPITAL ENCOUNTER (OUTPATIENT)
Dept: PHYSICAL THERAPY | Age: 74
Setting detail: THERAPIES SERIES
Discharge: HOME | End: 2019-08-22
Payer: MEDICARE

## 2019-08-22 PROCEDURE — 97110 THERAPEUTIC EXERCISES: CPT

## 2019-08-22 PROCEDURE — 97530 THERAPEUTIC ACTIVITIES: CPT

## 2019-08-22 PROCEDURE — 97140 MANUAL THERAPY 1/> REGIONS: CPT

## 2019-09-03 ENCOUNTER — HOSPITAL ENCOUNTER (OUTPATIENT)
Dept: PHYSICAL THERAPY | Age: 74
Setting detail: THERAPIES SERIES
Discharge: HOME | End: 2019-09-03
Payer: MEDICARE

## 2019-09-03 PROCEDURE — 97140 MANUAL THERAPY 1/> REGIONS: CPT

## 2019-09-03 PROCEDURE — 97530 THERAPEUTIC ACTIVITIES: CPT

## 2019-09-03 PROCEDURE — 97110 THERAPEUTIC EXERCISES: CPT

## 2019-09-03 NOTE — FLOWSHEET NOTE
[employer]    Physical Therapy Daily Treatment Note  Date:  9/3/2019    Patient Name:  Jakub Armendariz    :  1945  MRN: [id number]  Restrictions/Precautions:    Medical/Treatment Diagnosis Information:  Diagnosis: M17.11 (Primary OA of R knee)   Treatment Diagnosis: R knee pain   Insurance/Certification information:  PT Insurance Information: [place]/approval needed/$40 co-pay  Physician Information:  [facility]ition[facility] (Y/N):     Date of Patient follow up with Physician:     G-Code (if applicable):      Date G-Code Applied:     LEFS 76%    Progress Note: [x]  Yes  []  No  Next due by: Visit #10       Latex Allergy:  [x]NO      []YES  Preferred Language for Healthcare:   [x]English       []other:    Visit # Insurance Allowable   3 6     Pain level:  6/10     SUBJECTIVE:  Pt reports difficulty sleeping d/t increased soreness in posterior LE. Pt reports sleeping with pillow under knee at night. OBJECTIVE: See eval   Observation:    Test measurements:      RESTRICTIONS/PRECAUTIONS: Pre-hab, R TKA scheduled 10/19    Exercises/Interventions:     Therapeutic Ex Resistance Sets/sec Reps Notes   Retro Stepper/BIKE       Heel slides  Seated gastroc stretch   10s  3 x20  30s    Quad sets  x25     Hip adduction isometrics  Hip abduction isometrics  10 10s EOB   Seated marches  2 10 Cueing to limit hip ER    Knee extension stretch   4 min   With small towel under knee   LAQ  2 10    Leg lift onto step stool  2 10 Cueing to keep knee straight   Leg Press Ecc 0-       Cybex HS curl       TKE       Glute side walks       BOSU squat                            Manual Intervention       Knee mobs/PROM       Tib/Fem Mobs       Patella Mobs  8 min     Ankle mobs                     NMR re-education       Serbian/Biofeedback 10/10       G. Med activaiton/sidelying       G.  Max Activation/prone       Hip Ext full

## 2019-09-04 ENCOUNTER — OFFICE VISIT (OUTPATIENT)
Dept: FAMILY MEDICINE CLINIC | Age: 74
End: 2019-09-04
Payer: MEDICARE

## 2019-09-04 VITALS
HEART RATE: 60 BPM | BODY MASS INDEX: 34.74 KG/M2 | OXYGEN SATURATION: 97 % | TEMPERATURE: 98.2 F | WEIGHT: 229.2 LBS | DIASTOLIC BLOOD PRESSURE: 60 MMHG | HEIGHT: 68 IN | SYSTOLIC BLOOD PRESSURE: 120 MMHG

## 2019-09-04 DIAGNOSIS — Z00.00 ROUTINE GENERAL MEDICAL EXAMINATION AT A HEALTH CARE FACILITY: Primary | ICD-10-CM

## 2019-09-04 PROCEDURE — 90662 IIV NO PRSV INCREASED AG IM: CPT | Performed by: FAMILY MEDICINE

## 2019-09-04 PROCEDURE — G0438 PPPS, INITIAL VISIT: HCPCS | Performed by: FAMILY MEDICINE

## 2019-09-04 PROCEDURE — G0008 ADMIN INFLUENZA VIRUS VAC: HCPCS | Performed by: FAMILY MEDICINE

## 2019-09-04 ASSESSMENT — LIFESTYLE VARIABLES: HOW OFTEN DO YOU HAVE A DRINK CONTAINING ALCOHOL: 0

## 2019-09-04 ASSESSMENT — PATIENT HEALTH QUESTIONNAIRE - PHQ9
SUM OF ALL RESPONSES TO PHQ QUESTIONS 1-9: 2
SUM OF ALL RESPONSES TO PHQ QUESTIONS 1-9: 2

## 2019-09-04 NOTE — PATIENT INSTRUCTIONS
Personalized Preventive Plan for Shyann Bses - 9/4/2019  Medicare offers a range of preventive health benefits. Some of the tests and screenings are paid in full while other may be subject to a deductible, co-insurance, and/or copay. Some of these benefits include a comprehensive review of your medical history including lifestyle, illnesses that may run in your family, and various assessments and screenings as appropriate. After reviewing your medical record and screening and assessments performed today your provider may have ordered immunizations, labs, imaging, and/or referrals for you. A list of these orders (if applicable) as well as your Preventive Care list are included within your After Visit Summary for your review. Other Preventive Recommendations:    · A preventive eye exam performed by an eye specialist is recommended every 1-2 years to screen for glaucoma; cataracts, macular degeneration, and other eye disorders. · A preventive dental visit is recommended every 6 months. · Try to get at least 150 minutes of exercise per week or 10,000 steps per day on a pedometer . · Order or download the FREE \"Exercise & Physical Activity: Your Everyday Guide\" from [employer]. Call 0-683.399.5712 or search The [employer] online. · You need 2314-6533 mg of calcium and 4881-1275 IU of vitamin D per day. It is possible to meet your calcium requirement with diet alone, but a vitamin D supplement is usually necessary to meet this goal.  · When exposed to the sun, use a sunscreen that protects against both UVA and UVB radiation with an SPF of 30 or greater. Reapply every 2 to 3 hours or after sweating, drying off with a towel, or swimming. · Always wear a seat belt when traveling in a car. Always wear a helmet when riding a bicycle or motorcycle.

## 2019-09-10 RX ORDER — ACETAMINOPHEN 500 MG
1000 TABLET ORAL 2 TIMES DAILY
COMMUNITY

## 2019-09-17 ENCOUNTER — HOSPITAL ENCOUNTER (OUTPATIENT)
Dept: PHYSICAL THERAPY | Age: 74
Setting detail: THERAPIES SERIES
Discharge: HOME | End: 2019-09-17
Payer: MEDICARE

## 2019-09-17 PROCEDURE — 97530 THERAPEUTIC ACTIVITIES: CPT

## 2019-09-17 PROCEDURE — 97140 MANUAL THERAPY 1/> REGIONS: CPT

## 2019-09-17 PROCEDURE — 97110 THERAPEUTIC EXERCISES: CPT

## 2019-09-24 ENCOUNTER — OFFICE VISIT (OUTPATIENT)
Dept: FAMILY MEDICINE CLINIC | Age: 74
End: 2019-09-24
Payer: MEDICARE

## 2019-09-24 VITALS
DIASTOLIC BLOOD PRESSURE: 84 MMHG | OXYGEN SATURATION: 95 % | HEART RATE: 66 BPM | BODY MASS INDEX: 37.12 KG/M2 | TEMPERATURE: 97.7 F | WEIGHT: 230 LBS | SYSTOLIC BLOOD PRESSURE: 130 MMHG

## 2019-09-24 DIAGNOSIS — E78.5 HYPERLIPIDEMIA, UNSPECIFIED HYPERLIPIDEMIA TYPE: ICD-10-CM

## 2019-09-24 DIAGNOSIS — M17.0 PRIMARY OSTEOARTHRITIS OF BOTH KNEES: ICD-10-CM

## 2019-09-24 DIAGNOSIS — E11.8 TYPE 2 DIABETES MELLITUS WITH COMPLICATION, WITHOUT LONG-TERM CURRENT USE OF INSULIN (HCC): ICD-10-CM

## 2019-09-24 DIAGNOSIS — I10 ESSENTIAL HYPERTENSION: ICD-10-CM

## 2019-09-24 DIAGNOSIS — M85.80 OSTEOPENIA, UNSPECIFIED LOCATION: ICD-10-CM

## 2019-09-24 DIAGNOSIS — N18.9 CHRONIC KIDNEY DISEASE, UNSPECIFIED CKD STAGE: ICD-10-CM

## 2019-09-24 DIAGNOSIS — Z01.818 PRE-OP EXAM: Primary | ICD-10-CM

## 2019-09-24 DIAGNOSIS — F32.A DEPRESSION, UNSPECIFIED DEPRESSION TYPE: ICD-10-CM

## 2019-09-24 DIAGNOSIS — M10.072 IDIOPATHIC GOUT INVOLVING TOE OF LEFT FOOT, UNSPECIFIED CHRONICITY: ICD-10-CM

## 2019-09-24 PROCEDURE — 99213 OFFICE O/P EST LOW 20 MIN: CPT | Performed by: FAMILY MEDICINE

## 2019-09-24 PROCEDURE — 93000 ELECTROCARDIOGRAM COMPLETE: CPT | Performed by: FAMILY MEDICINE

## 2019-09-24 NOTE — PROGRESS NOTES
was negative except for what was noted in the HPI. Physical Exam   Constitutional: She is oriented to person, place, and time. She appears well-developed and well-nourished. No distress. HENT:   Head: Normocephalic and atraumatic. Mouth/Throat: Uvula is midline, oropharynx is clear and moist and mucous membranes are normal.   Eyes: Conjunctivae and EOM are normal. Pupils are equal, round, and reactive to light. Neck: Trachea normal and normal range of motion. Neck supple. No JVD present. Carotid bruit is not present. No mass and no thyromegaly present. Cardiovascular: Normal rate, regular rhythm, normal heart sounds and intact distal pulses. No murmur heard. Pulmonary/Chest: Effort normal and breath sounds normal. No respiratory distress. She has no wheezes. She has no rales. Abdominal: Soft. Normal aorta and bowel sounds are normal. She exhibits no distension and no mass. . No tenderness. Musculoskeletal: She exhibits no edema and no tenderness. Neurological: She is alert and oriented to person, place, and time. She has normal strength  Skin: Skin is warm and dry. No rash noted. No erythema. Psychiatric: She has a normal mood and affect. Her behavior is normal.     EKG Interpretation:  normal EKG, normal sinus rhythm, sinus bradycardia, unchanged from previous tracings. Lab Review pending        Assessment:       75 y.o. patient with planned surgery as above. Known risk factors for perioperative complications: Diabetes mellitus, Hypertension, Renal dysfunction  Current medications which may produce withdrawal symptoms if withheld perioperatively: none      Plan:     1. Preoperative workup as follows: ECG, labs are pending  2. Change in medication regimen before surgery: Discontinue ASA 7 days before surgery  3.  Prophylaxis for cardiac events with perioperative beta-blockers: Not indicated  ACC/AHA indications for pre-operative beta-blocker use:    · Vascular surgery with history of postitive stress test  · Intermediate or high risk surgery with history of CAD   · Intermediate or high risk surgery with multiple clinical predictors of CAD- 2 of the following: history of compensated or prior heart failure, history of cerebrovascular disease, DM, or renal insufficiency    Routine administration of higher-dose, long-acting metoprolol in beta-blocker-naïve patients on the day of surgery, and in the absence of dose titration is associated with an overall increase in mortality. Beta-blockers should be started days to weeks prior to surgery and titrated to pulse < 70.  4. Deep vein thrombosis prophylaxis: regimen to be chosen by surgical team  5.  No contraindications to planned surgery

## 2019-10-01 ENCOUNTER — HOSPITAL ENCOUNTER (OUTPATIENT)
Dept: PHYSICAL THERAPY | Age: 74
Setting detail: THERAPIES SERIES
Discharge: HOME | End: 2019-10-01
Payer: MEDICARE

## 2019-10-01 PROCEDURE — 97140 MANUAL THERAPY 1/> REGIONS: CPT

## 2019-10-01 PROCEDURE — 97110 THERAPEUTIC EXERCISES: CPT

## 2019-10-04 ENCOUNTER — OFFICE VISIT (OUTPATIENT)
Dept: FAMILY MEDICINE CLINIC | Age: 74
End: 2019-10-04
Payer: MEDICARE

## 2019-10-04 ENCOUNTER — HOSPITAL ENCOUNTER (OUTPATIENT)
Age: 74
Discharge: HOME | End: 2019-10-04
Payer: MEDICARE

## 2019-10-04 VITALS
WEIGHT: 231 LBS | BODY MASS INDEX: 37.28 KG/M2 | TEMPERATURE: 98.2 F | DIASTOLIC BLOOD PRESSURE: 84 MMHG | HEART RATE: 64 BPM | SYSTOLIC BLOOD PRESSURE: 122 MMHG | OXYGEN SATURATION: 94 %

## 2019-10-04 DIAGNOSIS — E78.5 HYPERLIPIDEMIA, UNSPECIFIED HYPERLIPIDEMIA TYPE: ICD-10-CM

## 2019-10-04 DIAGNOSIS — E11.8 TYPE 2 DIABETES MELLITUS WITH COMPLICATION, WITHOUT LONG-TERM CURRENT USE OF INSULIN (HCC): Primary | ICD-10-CM

## 2019-10-04 DIAGNOSIS — N18.9 CHRONIC KIDNEY DISEASE, UNSPECIFIED CKD STAGE: ICD-10-CM

## 2019-10-04 DIAGNOSIS — I10 ESSENTIAL HYPERTENSION: ICD-10-CM

## 2019-10-04 DIAGNOSIS — Z01.818 PREOP TESTING: ICD-10-CM

## 2019-10-04 DIAGNOSIS — M85.80 OSTEOPENIA, UNSPECIFIED LOCATION: ICD-10-CM

## 2019-10-04 DIAGNOSIS — D72.829 LEUKOCYTOSIS, UNSPECIFIED TYPE: ICD-10-CM

## 2019-10-04 DIAGNOSIS — M10.072 IDIOPATHIC GOUT INVOLVING TOE OF LEFT FOOT, UNSPECIFIED CHRONICITY: ICD-10-CM

## 2019-10-04 DIAGNOSIS — M17.11 PRIMARY OSTEOARTHRITIS OF RIGHT KNEE: ICD-10-CM

## 2019-10-04 DIAGNOSIS — F32.A DEPRESSION, UNSPECIFIED DEPRESSION TYPE: ICD-10-CM

## 2019-10-04 LAB
ABO/RH: NORMAL
ANION GAP SERPL CALCULATED.3IONS-SCNC: 15 MMOL/L (ref 3–16)
ANTIBODY SCREEN: NORMAL
APTT: 30.7 SEC (ref 26–36)
BASOPHILS ABSOLUTE: 0 K/UL (ref 0–0.2)
BASOPHILS RELATIVE PERCENT: 0.3 %
BILIRUBIN, POC: NORMAL
BLOOD URINE, POC: NORMAL
BUN BLDV-MCNC: 33 MG/DL (ref 7–20)
CALCIUM SERPL-MCNC: 9.7 MG/DL (ref 8.3–10.6)
CHLORIDE BLD-SCNC: 100 MMOL/L (ref 99–110)
CHP ED QC CHECK: NORMAL
CLARITY, POC: CLEAR
CO2: 24 MMOL/L (ref 21–32)
COLOR, POC: YELLOW
CREAT SERPL-MCNC: 1.6 MG/DL (ref 0.6–1.2)
EOSINOPHILS ABSOLUTE: 0.1 K/UL (ref 0–0.6)
EOSINOPHILS RELATIVE PERCENT: 0.7 %
GFR AFRICAN AMERICAN: 38
GFR NON-AFRICAN AMERICAN: 31
GLUCOSE BLD-MCNC: 151 MG/DL
GLUCOSE BLD-MCNC: 166 MG/DL (ref 70–99)
GLUCOSE URINE, POC: NORMAL
HCT VFR BLD CALC: 38 % (ref 36–48)
HEMOGLOBIN: 13.1 G/DL (ref 12–16)
INR BLD: 0.96 (ref 0.86–1.14)
KETONES, POC: NORMAL
LEUKOCYTE EST, POC: NORMAL
LYMPHOCYTES ABSOLUTE: 1 K/UL (ref 1–5.1)
LYMPHOCYTES RELATIVE PERCENT: 8.8 %
MCH RBC QN AUTO: 32.8 PG (ref 26–34)
MCHC RBC AUTO-ENTMCNC: 34.5 G/DL (ref 31–36)
MCV RBC AUTO: 95 FL (ref 80–100)
MONOCYTES ABSOLUTE: 0.4 K/UL (ref 0–1.3)
MONOCYTES RELATIVE PERCENT: 3.3 %
NEUTROPHILS ABSOLUTE: 9.7 K/UL (ref 1.7–7.7)
NEUTROPHILS RELATIVE PERCENT: 86.9 %
NITRITE, POC: NORMAL
PDW BLD-RTO: 14 % (ref 12.4–15.4)
PH, POC: 5
PLATELET # BLD: 247 K/UL (ref 135–450)
PMV BLD AUTO: 8 FL (ref 5–10.5)
POTASSIUM SERPL-SCNC: 5.1 MMOL/L (ref 3.5–5.1)
PROTEIN, POC: NORMAL
PROTHROMBIN TIME: 10.9 SEC (ref 9.8–13)
RBC # BLD: 4.01 M/UL (ref 4–5.2)
SODIUM BLD-SCNC: 139 MMOL/L (ref 136–145)
SPECIFIC GRAVITY, POC: 1.02
UROBILINOGEN, POC: 1
WBC # BLD: 11.1 K/UL (ref 4–11)

## 2019-10-04 PROCEDURE — 87081 CULTURE SCREEN ONLY: CPT

## 2019-10-04 PROCEDURE — 85025 COMPLETE CBC W/AUTO DIFF WBC: CPT

## 2019-10-04 PROCEDURE — 86901 BLOOD TYPING SEROLOGIC RH(D): CPT

## 2019-10-04 PROCEDURE — 80048 BASIC METABOLIC PNL TOTAL CA: CPT

## 2019-10-04 PROCEDURE — 82962 GLUCOSE BLOOD TEST: CPT | Performed by: FAMILY MEDICINE

## 2019-10-04 PROCEDURE — 99214 OFFICE O/P EST MOD 30 MIN: CPT | Performed by: FAMILY MEDICINE

## 2019-10-04 PROCEDURE — 86900 BLOOD TYPING SEROLOGIC ABO: CPT

## 2019-10-04 PROCEDURE — 81002 URINALYSIS NONAUTO W/O SCOPE: CPT | Performed by: FAMILY MEDICINE

## 2019-10-04 PROCEDURE — 36415 COLL VENOUS BLD VENIPUNCTURE: CPT

## 2019-10-04 PROCEDURE — 85730 THROMBOPLASTIN TIME PARTIAL: CPT

## 2019-10-04 PROCEDURE — 85610 PROTHROMBIN TIME: CPT

## 2019-10-04 PROCEDURE — 86850 RBC ANTIBODY SCREEN: CPT

## 2019-10-04 RX ORDER — LISINOPRIL 10 MG/1
10 TABLET ORAL DAILY
Qty: 90 TABLET | Refills: 0 | Status: SHIPPED | OUTPATIENT
Start: 2019-10-04 | End: 2020-01-06 | Stop reason: SDUPTHER

## 2019-10-04 RX ORDER — PRAVASTATIN SODIUM 40 MG
40 TABLET ORAL DAILY
Qty: 90 TABLET | Refills: 0 | Status: SHIPPED | OUTPATIENT
Start: 2019-10-04 | End: 2020-01-06 | Stop reason: SDUPTHER

## 2019-10-04 RX ORDER — SERTRALINE HYDROCHLORIDE 100 MG/1
TABLET, FILM COATED ORAL
Qty: 135 TABLET | Refills: 0 | Status: SHIPPED | OUTPATIENT
Start: 2019-10-04 | End: 2020-01-06 | Stop reason: SDUPTHER

## 2019-10-04 RX ORDER — GLIPIZIDE 5 MG/1
5 TABLET, FILM COATED, EXTENDED RELEASE ORAL DAILY
Qty: 90 TABLET | Refills: 0 | Status: ON HOLD | OUTPATIENT
Start: 2019-10-04 | End: 2019-10-21

## 2019-10-04 RX ORDER — BISOPROLOL FUMARATE AND HYDROCHLOROTHIAZIDE 10; 6.25 MG/1; MG/1
1 TABLET ORAL DAILY
Qty: 90 TABLET | Refills: 0 | Status: SHIPPED | OUTPATIENT
Start: 2019-10-04 | End: 2020-01-06 | Stop reason: SDUPTHER

## 2019-10-06 LAB — MRSA CULTURE ONLY: NORMAL

## 2019-10-06 ASSESSMENT — ENCOUNTER SYMPTOMS
SHORTNESS OF BREATH: 0
COUGH: 0
ABDOMINAL PAIN: 0
CHEST TIGHTNESS: 0
BLOOD IN STOOL: 0

## 2019-10-08 ENCOUNTER — HOSPITAL ENCOUNTER (OUTPATIENT)
Age: 74
Discharge: HOME | End: 2019-10-08
Payer: MEDICARE

## 2019-10-08 LAB
BACTERIA: ABNORMAL /HPF
BILIRUBIN URINE: ABNORMAL
BLOOD, URINE: NEGATIVE
CLARITY: ABNORMAL
COLOR: YELLOW
EPITHELIAL CELLS, UA: 13 /HPF (ref 0–5)
GLUCOSE URINE: NEGATIVE MG/DL
HYALINE CASTS: 11 /LPF (ref 0–8)
KETONES, URINE: NEGATIVE MG/DL
LEUKOCYTE ESTERASE, URINE: NEGATIVE
MICROSCOPIC EXAMINATION: YES
NITRITE, URINE: NEGATIVE
PH UA: 6 (ref 5–8)
PROTEIN UA: NEGATIVE MG/DL
RBC UA: ABNORMAL /HPF (ref 0–2)
SPECIFIC GRAVITY UA: 1.03 (ref 1–1.03)
URINE TYPE: ABNORMAL
UROBILINOGEN, URINE: 0.2 E.U./DL
WBC UA: 9 /HPF (ref 0–5)

## 2019-10-08 PROCEDURE — 81001 URINALYSIS AUTO W/SCOPE: CPT

## 2019-10-08 PROCEDURE — 87086 URINE CULTURE/COLONY COUNT: CPT

## 2019-10-09 LAB — URINE CULTURE, ROUTINE: NORMAL

## 2019-10-15 ENCOUNTER — TELEPHONE (OUTPATIENT)
Dept: ORTHOPEDIC SURGERY | Age: 74
End: 2019-10-15

## 2019-10-21 ENCOUNTER — ANESTHESIA EVENT (OUTPATIENT)
Dept: OPERATING ROOM | Age: 74
DRG: 470 | End: 2019-10-21
Payer: MEDICARE

## 2019-10-21 ENCOUNTER — HOSPITAL ENCOUNTER (INPATIENT)
Age: 74
LOS: 1 days | Discharge: HOME HEALTH | DRG: 470 | End: 2019-10-22
Attending: ORTHOPAEDIC SURGERY | Admitting: ORTHOPAEDIC SURGERY
Payer: MEDICARE

## 2019-10-21 ENCOUNTER — APPOINTMENT (OUTPATIENT)
Dept: GENERAL RADIOLOGY | Age: 74
DRG: 470 | End: 2019-10-21
Attending: ORTHOPAEDIC SURGERY
Payer: MEDICARE

## 2019-10-21 ENCOUNTER — ANESTHESIA (OUTPATIENT)
Dept: OPERATING ROOM | Age: 74
DRG: 470 | End: 2019-10-21
Payer: MEDICARE

## 2019-10-21 VITALS — DIASTOLIC BLOOD PRESSURE: 65 MMHG | SYSTOLIC BLOOD PRESSURE: 155 MMHG | TEMPERATURE: 97.7 F | OXYGEN SATURATION: 97 %

## 2019-10-21 DIAGNOSIS — Z01.818 PREOP TESTING: Primary | ICD-10-CM

## 2019-10-21 DIAGNOSIS — M17.11 PRIMARY OSTEOARTHRITIS OF RIGHT KNEE: ICD-10-CM

## 2019-10-21 PROBLEM — R79.89 PRERENAL AZOTEMIA: Status: ACTIVE | Noted: 2019-10-21

## 2019-10-21 PROBLEM — H91.93 BILATERAL HEARING LOSS: Status: RESOLVED | Noted: 2017-05-05 | Resolved: 2019-10-21

## 2019-10-21 PROBLEM — Z48.89 ENCOUNTER FOR POSTOPERATIVE CARE: Status: ACTIVE | Noted: 2019-10-21

## 2019-10-21 LAB
ABO/RH: NORMAL
ANION GAP SERPL CALCULATED.3IONS-SCNC: 9 MMOL/L (ref 3–16)
ANTIBODY SCREEN: NORMAL
BUN BLDV-MCNC: 33 MG/DL (ref 7–20)
CALCIUM SERPL-MCNC: 9.8 MG/DL (ref 8.3–10.6)
CHLORIDE BLD-SCNC: 102 MMOL/L (ref 99–110)
CO2: 27 MMOL/L (ref 21–32)
CREAT SERPL-MCNC: 1.3 MG/DL (ref 0.6–1.2)
GFR AFRICAN AMERICAN: 48
GFR NON-AFRICAN AMERICAN: 40
GLUCOSE BLD-MCNC: 125 MG/DL (ref 70–99)
GLUCOSE BLD-MCNC: 129 MG/DL (ref 70–99)
GLUCOSE BLD-MCNC: 148 MG/DL (ref 70–99)
GLUCOSE BLD-MCNC: 202 MG/DL (ref 70–99)
GLUCOSE BLD-MCNC: 228 MG/DL (ref 70–99)
HCT VFR BLD CALC: 36.3 % (ref 36–48)
HEMOGLOBIN: 12 G/DL (ref 12–16)
PERFORMED ON: ABNORMAL
POTASSIUM SERPL-SCNC: 4.9 MMOL/L (ref 3.5–5.1)
SODIUM BLD-SCNC: 138 MMOL/L (ref 136–145)

## 2019-10-21 PROCEDURE — 97161 PT EVAL LOW COMPLEX 20 MIN: CPT

## 2019-10-21 PROCEDURE — 2500000003 HC RX 250 WO HCPCS: Performed by: ORTHOPAEDIC SURGERY

## 2019-10-21 PROCEDURE — 97165 OT EVAL LOW COMPLEX 30 MIN: CPT

## 2019-10-21 PROCEDURE — 97530 THERAPEUTIC ACTIVITIES: CPT

## 2019-10-21 PROCEDURE — 3600000015 HC SURGERY LEVEL 5 ADDTL 15MIN: Performed by: ORTHOPAEDIC SURGERY

## 2019-10-21 PROCEDURE — 7100000000 HC PACU RECOVERY - FIRST 15 MIN: Performed by: ORTHOPAEDIC SURGERY

## 2019-10-21 PROCEDURE — 3700000001 HC ADD 15 MINUTES (ANESTHESIA): Performed by: ORTHOPAEDIC SURGERY

## 2019-10-21 PROCEDURE — 99024 POSTOP FOLLOW-UP VISIT: CPT | Performed by: NURSE PRACTITIONER

## 2019-10-21 PROCEDURE — 2720000010 HC SURG SUPPLY STERILE: Performed by: ORTHOPAEDIC SURGERY

## 2019-10-21 PROCEDURE — 6360000002 HC RX W HCPCS: Performed by: ORTHOPAEDIC SURGERY

## 2019-10-21 PROCEDURE — 6360000002 HC RX W HCPCS: Performed by: ANESTHESIOLOGY

## 2019-10-21 PROCEDURE — 86901 BLOOD TYPING SEROLOGIC RH(D): CPT

## 2019-10-21 PROCEDURE — 85018 HEMOGLOBIN: CPT

## 2019-10-21 PROCEDURE — 64447 NJX AA&/STRD FEMORAL NRV IMG: CPT | Performed by: ANESTHESIOLOGY

## 2019-10-21 PROCEDURE — 2580000003 HC RX 258: Performed by: ORTHOPAEDIC SURGERY

## 2019-10-21 PROCEDURE — 2500000003 HC RX 250 WO HCPCS: Performed by: NURSE ANESTHETIST, CERTIFIED REGISTERED

## 2019-10-21 PROCEDURE — 85014 HEMATOCRIT: CPT

## 2019-10-21 PROCEDURE — 0SRC0J9 REPLACEMENT OF RIGHT KNEE JOINT WITH SYNTHETIC SUBSTITUTE, CEMENTED, OPEN APPROACH: ICD-10-PCS | Performed by: ORTHOPAEDIC SURGERY

## 2019-10-21 PROCEDURE — 3700000000 HC ANESTHESIA ATTENDED CARE: Performed by: ORTHOPAEDIC SURGERY

## 2019-10-21 PROCEDURE — 6360000002 HC RX W HCPCS: Performed by: NURSE ANESTHETIST, CERTIFIED REGISTERED

## 2019-10-21 PROCEDURE — C1713 ANCHOR/SCREW BN/BN,TIS/BN: HCPCS | Performed by: ORTHOPAEDIC SURGERY

## 2019-10-21 PROCEDURE — 3600000005 HC SURGERY LEVEL 5 BASE: Performed by: ORTHOPAEDIC SURGERY

## 2019-10-21 PROCEDURE — 7100000001 HC PACU RECOVERY - ADDTL 15 MIN: Performed by: ORTHOPAEDIC SURGERY

## 2019-10-21 PROCEDURE — 6370000000 HC RX 637 (ALT 250 FOR IP): Performed by: ORTHOPAEDIC SURGERY

## 2019-10-21 PROCEDURE — 73560 X-RAY EXAM OF KNEE 1 OR 2: CPT

## 2019-10-21 PROCEDURE — 94760 N-INVAS EAR/PLS OXIMETRY 1: CPT

## 2019-10-21 PROCEDURE — 86850 RBC ANTIBODY SCREEN: CPT

## 2019-10-21 PROCEDURE — C1776 JOINT DEVICE (IMPLANTABLE): HCPCS | Performed by: ORTHOPAEDIC SURGERY

## 2019-10-21 PROCEDURE — 2709999900 HC NON-CHARGEABLE SUPPLY: Performed by: ORTHOPAEDIC SURGERY

## 2019-10-21 PROCEDURE — 86900 BLOOD TYPING SEROLOGIC ABO: CPT

## 2019-10-21 PROCEDURE — 97116 GAIT TRAINING THERAPY: CPT

## 2019-10-21 PROCEDURE — 1200000000 HC SEMI PRIVATE

## 2019-10-21 PROCEDURE — 80048 BASIC METABOLIC PNL TOTAL CA: CPT

## 2019-10-21 DEVICE — COMPONENT FEM SZ 5 R KNEE NAR POST STBL CEM ATTUNE: Type: IMPLANTABLE DEVICE | Site: KNEE | Status: FUNCTIONAL

## 2019-10-21 DEVICE — COMPONENT PAT DIA35MM KNEE POLY CEM MEDIALIZED ANAT ATTUNE: Type: IMPLANTABLE DEVICE | Site: KNEE | Status: FUNCTIONAL

## 2019-10-21 DEVICE — CEMENT BNE 40GM FULL DOSE PMMA W/ GENT HI VISC RADPQ LNG: Type: IMPLANTABLE DEVICE | Status: FUNCTIONAL

## 2019-10-21 DEVICE — INSERT TIB SZ 5 THK5MM KNEE POST STBL ROT PLATFRM ATTUNE: Type: IMPLANTABLE DEVICE | Site: KNEE | Status: FUNCTIONAL

## 2019-10-21 DEVICE — BASEPLATE TIB SZ 4 KNEE ROT PLATFRM CEM SYS ATTUNE: Type: IMPLANTABLE DEVICE | Site: KNEE | Status: FUNCTIONAL

## 2019-10-21 RX ORDER — ONDANSETRON 2 MG/ML
INJECTION INTRAMUSCULAR; INTRAVENOUS PRN
Status: DISCONTINUED | OUTPATIENT
Start: 2019-10-21 | End: 2019-10-21 | Stop reason: SDUPTHER

## 2019-10-21 RX ORDER — FENTANYL CITRATE 50 UG/ML
100 INJECTION, SOLUTION INTRAMUSCULAR; INTRAVENOUS ONCE
Status: COMPLETED | OUTPATIENT
Start: 2019-10-21 | End: 2019-10-21

## 2019-10-21 RX ORDER — DEXTROSE MONOHYDRATE 50 MG/ML
100 INJECTION, SOLUTION INTRAVENOUS PRN
Status: DISCONTINUED | OUTPATIENT
Start: 2019-10-21 | End: 2019-10-22 | Stop reason: HOSPADM

## 2019-10-21 RX ORDER — SODIUM CHLORIDE, SODIUM LACTATE, POTASSIUM CHLORIDE, CALCIUM CHLORIDE 600; 310; 30; 20 MG/100ML; MG/100ML; MG/100ML; MG/100ML
INJECTION, SOLUTION INTRAVENOUS CONTINUOUS
Status: DISCONTINUED | OUTPATIENT
Start: 2019-10-21 | End: 2019-10-22 | Stop reason: HOSPADM

## 2019-10-21 RX ORDER — LIDOCAINE HYDROCHLORIDE 20 MG/ML
INJECTION, SOLUTION INFILTRATION; PERINEURAL PRN
Status: DISCONTINUED | OUTPATIENT
Start: 2019-10-21 | End: 2019-10-21 | Stop reason: SDUPTHER

## 2019-10-21 RX ORDER — ROPIVACAINE HYDROCHLORIDE 5 MG/ML
INJECTION, SOLUTION EPIDURAL; INFILTRATION; PERINEURAL
Status: COMPLETED | OUTPATIENT
Start: 2019-10-21 | End: 2019-10-21

## 2019-10-21 RX ORDER — HYDROMORPHONE HCL 110MG/55ML
0.25 PATIENT CONTROLLED ANALGESIA SYRINGE INTRAVENOUS
Status: DISCONTINUED | OUTPATIENT
Start: 2019-10-21 | End: 2019-10-22 | Stop reason: HOSPADM

## 2019-10-21 RX ORDER — SODIUM CHLORIDE 0.9 % (FLUSH) 0.9 %
10 SYRINGE (ML) INJECTION EVERY 12 HOURS SCHEDULED
Status: DISCONTINUED | OUTPATIENT
Start: 2019-10-21 | End: 2019-10-22 | Stop reason: HOSPADM

## 2019-10-21 RX ORDER — CEFAZOLIN SODIUM 2 G/100ML
2 INJECTION, SOLUTION INTRAVENOUS EVERY 8 HOURS
Status: COMPLETED | OUTPATIENT
Start: 2019-10-21 | End: 2019-10-22

## 2019-10-21 RX ORDER — LABETALOL HYDROCHLORIDE 5 MG/ML
5 INJECTION, SOLUTION INTRAVENOUS EVERY 10 MIN PRN
Status: DISCONTINUED | OUTPATIENT
Start: 2019-10-21 | End: 2019-10-21

## 2019-10-21 RX ORDER — GLIPIZIDE 5 MG/1
5 TABLET, FILM COATED, EXTENDED RELEASE ORAL DAILY
Status: DISCONTINUED | OUTPATIENT
Start: 2019-10-21 | End: 2019-10-21 | Stop reason: SDUPTHER

## 2019-10-21 RX ORDER — GLYCOPYRROLATE 0.2 MG/ML
INJECTION INTRAMUSCULAR; INTRAVENOUS PRN
Status: DISCONTINUED | OUTPATIENT
Start: 2019-10-21 | End: 2019-10-21 | Stop reason: SDUPTHER

## 2019-10-21 RX ORDER — SODIUM CHLORIDE 0.9 % (FLUSH) 0.9 %
10 SYRINGE (ML) INJECTION PRN
Status: CANCELLED | OUTPATIENT
Start: 2019-10-21

## 2019-10-21 RX ORDER — HYDROMORPHONE HCL 110MG/55ML
0.5 PATIENT CONTROLLED ANALGESIA SYRINGE INTRAVENOUS
Status: DISCONTINUED | OUTPATIENT
Start: 2019-10-21 | End: 2019-10-22 | Stop reason: HOSPADM

## 2019-10-21 RX ORDER — METOPROLOL TARTRATE 50 MG/1
50 TABLET, FILM COATED ORAL 2 TIMES DAILY
Status: DISCONTINUED | OUTPATIENT
Start: 2019-10-21 | End: 2019-10-22 | Stop reason: HOSPADM

## 2019-10-21 RX ORDER — LIDOCAINE HYDROCHLORIDE 10 MG/ML
0.5 INJECTION, SOLUTION EPIDURAL; INFILTRATION; INTRACAUDAL; PERINEURAL ONCE
Status: COMPLETED | OUTPATIENT
Start: 2019-10-21 | End: 2019-10-21

## 2019-10-21 RX ORDER — ONDANSETRON 2 MG/ML
4 INJECTION INTRAMUSCULAR; INTRAVENOUS EVERY 6 HOURS PRN
Status: DISCONTINUED | OUTPATIENT
Start: 2019-10-21 | End: 2019-10-22 | Stop reason: HOSPADM

## 2019-10-21 RX ORDER — SODIUM CHLORIDE 9 MG/ML
INJECTION, SOLUTION INTRAVENOUS CONTINUOUS
Status: DISCONTINUED | OUTPATIENT
Start: 2019-10-21 | End: 2019-10-21

## 2019-10-21 RX ORDER — PROMETHAZINE HYDROCHLORIDE 25 MG/ML
6.25 INJECTION, SOLUTION INTRAMUSCULAR; INTRAVENOUS
Status: DISCONTINUED | OUTPATIENT
Start: 2019-10-21 | End: 2019-10-21

## 2019-10-21 RX ORDER — ACETAMINOPHEN 500 MG
1000 TABLET ORAL 3 TIMES DAILY
Status: DISCONTINUED | OUTPATIENT
Start: 2019-10-21 | End: 2019-10-22 | Stop reason: HOSPADM

## 2019-10-21 RX ORDER — HYDROMORPHONE HCL 110MG/55ML
0.5 PATIENT CONTROLLED ANALGESIA SYRINGE INTRAVENOUS EVERY 5 MIN PRN
Status: DISCONTINUED | OUTPATIENT
Start: 2019-10-21 | End: 2019-10-21

## 2019-10-21 RX ORDER — EPHEDRINE SULFATE/0.9% NACL/PF 50 MG/5 ML
SYRINGE (ML) INTRAVENOUS PRN
Status: DISCONTINUED | OUTPATIENT
Start: 2019-10-21 | End: 2019-10-21 | Stop reason: SDUPTHER

## 2019-10-21 RX ORDER — BISOPROLOL FUMARATE AND HYDROCHLOROTHIAZIDE 10; 6.25 MG/1; MG/1
1 TABLET ORAL DAILY
Status: DISCONTINUED | OUTPATIENT
Start: 2019-10-21 | End: 2019-10-21 | Stop reason: CLARIF

## 2019-10-21 RX ORDER — SODIUM CHLORIDE 0.9 % (FLUSH) 0.9 %
10 SYRINGE (ML) INJECTION EVERY 12 HOURS SCHEDULED
Status: CANCELLED | OUTPATIENT
Start: 2019-10-21

## 2019-10-21 RX ORDER — CEFAZOLIN SODIUM 2 G/100ML
2 INJECTION, SOLUTION INTRAVENOUS
Status: COMPLETED | OUTPATIENT
Start: 2019-10-21 | End: 2019-10-21

## 2019-10-21 RX ORDER — GLIPIZIDE 5 MG/1
5 TABLET ORAL
Status: DISCONTINUED | OUTPATIENT
Start: 2019-10-22 | End: 2019-10-22 | Stop reason: HOSPADM

## 2019-10-21 RX ORDER — DEXAMETHASONE SODIUM PHOSPHATE 4 MG/ML
10 INJECTION, SOLUTION INTRA-ARTICULAR; INTRALESIONAL; INTRAMUSCULAR; INTRAVENOUS; SOFT TISSUE ONCE
Status: COMPLETED | OUTPATIENT
Start: 2019-10-22 | End: 2019-10-22

## 2019-10-21 RX ORDER — NICOTINE POLACRILEX 4 MG
15 LOZENGE BUCCAL PRN
Status: DISCONTINUED | OUTPATIENT
Start: 2019-10-21 | End: 2019-10-22 | Stop reason: HOSPADM

## 2019-10-21 RX ORDER — FENTANYL CITRATE 50 UG/ML
25 INJECTION, SOLUTION INTRAMUSCULAR; INTRAVENOUS EVERY 5 MIN PRN
Status: DISCONTINUED | OUTPATIENT
Start: 2019-10-21 | End: 2019-10-21

## 2019-10-21 RX ORDER — DOCUSATE SODIUM 100 MG/1
100 CAPSULE, LIQUID FILLED ORAL 2 TIMES DAILY
Status: DISCONTINUED | OUTPATIENT
Start: 2019-10-21 | End: 2019-10-22 | Stop reason: HOSPADM

## 2019-10-21 RX ORDER — LISINOPRIL 10 MG/1
10 TABLET ORAL DAILY
Status: DISCONTINUED | OUTPATIENT
Start: 2019-10-21 | End: 2019-10-22 | Stop reason: HOSPADM

## 2019-10-21 RX ORDER — SODIUM CHLORIDE 0.9 % (FLUSH) 0.9 %
10 SYRINGE (ML) INJECTION PRN
Status: DISCONTINUED | OUTPATIENT
Start: 2019-10-21 | End: 2019-10-22 | Stop reason: HOSPADM

## 2019-10-21 RX ORDER — PROPOFOL 10 MG/ML
INJECTION, EMULSION INTRAVENOUS PRN
Status: DISCONTINUED | OUTPATIENT
Start: 2019-10-21 | End: 2019-10-21 | Stop reason: SDUPTHER

## 2019-10-21 RX ORDER — CELECOXIB 200 MG/1
200 CAPSULE ORAL 2 TIMES DAILY
Status: DISCONTINUED | OUTPATIENT
Start: 2019-10-21 | End: 2019-10-21

## 2019-10-21 RX ORDER — PRAVASTATIN SODIUM 40 MG
40 TABLET ORAL DAILY
Status: DISCONTINUED | OUTPATIENT
Start: 2019-10-21 | End: 2019-10-22 | Stop reason: HOSPADM

## 2019-10-21 RX ORDER — OXYCODONE HYDROCHLORIDE 5 MG/1
10 TABLET ORAL EVERY 4 HOURS PRN
Status: DISCONTINUED | OUTPATIENT
Start: 2019-10-21 | End: 2019-10-22

## 2019-10-21 RX ORDER — SODIUM CHLORIDE 9 MG/ML
INJECTION, SOLUTION INTRAVENOUS CONTINUOUS
Status: CANCELLED | OUTPATIENT
Start: 2019-10-21

## 2019-10-21 RX ORDER — DEXTROSE MONOHYDRATE 25 G/50ML
12.5 INJECTION, SOLUTION INTRAVENOUS PRN
Status: DISCONTINUED | OUTPATIENT
Start: 2019-10-21 | End: 2019-10-22 | Stop reason: HOSPADM

## 2019-10-21 RX ORDER — ROPIVACAINE HYDROCHLORIDE 5 MG/ML
30 INJECTION, SOLUTION EPIDURAL; INFILTRATION; PERINEURAL ONCE
Status: DISCONTINUED | OUTPATIENT
Start: 2019-10-21 | End: 2019-10-22 | Stop reason: HOSPADM

## 2019-10-21 RX ORDER — OXYCODONE HYDROCHLORIDE 5 MG/1
5 TABLET ORAL EVERY 4 HOURS PRN
Status: DISCONTINUED | OUTPATIENT
Start: 2019-10-21 | End: 2019-10-22

## 2019-10-21 RX ORDER — PROPOFOL 10 MG/ML
INJECTION, EMULSION INTRAVENOUS CONTINUOUS PRN
Status: DISCONTINUED | OUTPATIENT
Start: 2019-10-21 | End: 2019-10-21 | Stop reason: SDUPTHER

## 2019-10-21 RX ORDER — DEXAMETHASONE SODIUM PHOSPHATE 4 MG/ML
INJECTION, SOLUTION INTRA-ARTICULAR; INTRALESIONAL; INTRAMUSCULAR; INTRAVENOUS; SOFT TISSUE PRN
Status: DISCONTINUED | OUTPATIENT
Start: 2019-10-21 | End: 2019-10-21 | Stop reason: SDUPTHER

## 2019-10-21 RX ADMIN — CEFAZOLIN SODIUM 2 G: 2 INJECTION, SOLUTION INTRAVENOUS at 10:07

## 2019-10-21 RX ADMIN — PHENYLEPHRINE HYDROCHLORIDE 80 MCG: 10 INJECTION INTRAVENOUS at 10:33

## 2019-10-21 RX ADMIN — PHENYLEPHRINE HYDROCHLORIDE 160 MCG: 10 INJECTION INTRAVENOUS at 10:47

## 2019-10-21 RX ADMIN — ASPIRIN 325 MG: 325 TABLET, DELAYED RELEASE ORAL at 20:57

## 2019-10-21 RX ADMIN — ACETAMINOPHEN 1000 MG: 500 TABLET, FILM COATED ORAL at 20:57

## 2019-10-21 RX ADMIN — LIDOCAINE HYDROCHLORIDE 1 ML: 10 INJECTION, SOLUTION EPIDURAL; INFILTRATION; INTRACAUDAL; PERINEURAL at 10:24

## 2019-10-21 RX ADMIN — Medication 10 MG: at 11:24

## 2019-10-21 RX ADMIN — PHENYLEPHRINE HYDROCHLORIDE 80 MCG: 10 INJECTION INTRAVENOUS at 10:37

## 2019-10-21 RX ADMIN — DOCUSATE SODIUM 100 MG: 100 CAPSULE ORAL at 15:56

## 2019-10-21 RX ADMIN — GLYCOPYRROLATE 0.2 MG: 0.2 INJECTION INTRAMUSCULAR; INTRAVENOUS at 10:28

## 2019-10-21 RX ADMIN — SERTRALINE 150 MG: 50 TABLET, FILM COATED ORAL at 15:56

## 2019-10-21 RX ADMIN — DEXAMETHASONE SODIUM PHOSPHATE 10 MG: 4 INJECTION, SOLUTION INTRAMUSCULAR; INTRAVENOUS at 10:30

## 2019-10-21 RX ADMIN — DOCUSATE SODIUM 100 MG: 100 CAPSULE ORAL at 20:58

## 2019-10-21 RX ADMIN — Medication 10 MG: at 11:19

## 2019-10-21 RX ADMIN — ROPIVACAINE HYDROCHLORIDE 25 ML: 5 INJECTION, SOLUTION EPIDURAL; INFILTRATION; PERINEURAL at 09:25

## 2019-10-21 RX ADMIN — Medication 10 MG: at 10:51

## 2019-10-21 RX ADMIN — LIDOCAINE HYDROCHLORIDE 100 MG: 20 INJECTION, SOLUTION INFILTRATION; PERINEURAL at 10:26

## 2019-10-21 RX ADMIN — TRANEXAMIC ACID 1 G: 1 INJECTION, SOLUTION INTRAVENOUS at 11:30

## 2019-10-21 RX ADMIN — INSULIN LISPRO 4 UNITS: 100 INJECTION, SOLUTION INTRAVENOUS; SUBCUTANEOUS at 17:50

## 2019-10-21 RX ADMIN — FENTANYL CITRATE 50 MCG: 50 INJECTION INTRAMUSCULAR; INTRAVENOUS at 09:21

## 2019-10-21 RX ADMIN — PHENYLEPHRINE HYDROCHLORIDE 160 MCG: 10 INJECTION INTRAVENOUS at 11:04

## 2019-10-21 RX ADMIN — Medication 10 MG: at 10:57

## 2019-10-21 RX ADMIN — PROPOFOL 30 MG: 10 INJECTION, EMULSION INTRAVENOUS at 10:29

## 2019-10-21 RX ADMIN — TRANEXAMIC ACID 1000 MG: 1 INJECTION, SOLUTION INTRAVENOUS at 10:06

## 2019-10-21 RX ADMIN — ONDANSETRON 4 MG: 2 INJECTION INTRAMUSCULAR; INTRAVENOUS at 10:34

## 2019-10-21 RX ADMIN — PHENYLEPHRINE HYDROCHLORIDE 80 MCG: 10 INJECTION INTRAVENOUS at 10:28

## 2019-10-21 RX ADMIN — PROPOFOL 30 MG: 10 INJECTION, EMULSION INTRAVENOUS at 10:26

## 2019-10-21 RX ADMIN — SODIUM CHLORIDE, POTASSIUM CHLORIDE, SODIUM LACTATE AND CALCIUM CHLORIDE: 600; 310; 30; 20 INJECTION, SOLUTION INTRAVENOUS at 13:58

## 2019-10-21 RX ADMIN — PROPOFOL 30 MG: 10 INJECTION, EMULSION INTRAVENOUS at 10:32

## 2019-10-21 RX ADMIN — PHENYLEPHRINE HYDROCHLORIDE 160 MCG: 10 INJECTION INTRAVENOUS at 11:14

## 2019-10-21 RX ADMIN — SODIUM CHLORIDE: 9 INJECTION, SOLUTION INTRAVENOUS at 08:48

## 2019-10-21 RX ADMIN — LISINOPRIL 10 MG: 10 TABLET ORAL at 15:56

## 2019-10-21 RX ADMIN — ASPIRIN 325 MG: 325 TABLET, DELAYED RELEASE ORAL at 15:56

## 2019-10-21 RX ADMIN — PROPOFOL 140 MCG/KG/MIN: 10 INJECTION, EMULSION INTRAVENOUS at 10:26

## 2019-10-21 RX ADMIN — PHENYLEPHRINE HYDROCHLORIDE 160 MCG: 10 INJECTION INTRAVENOUS at 11:41

## 2019-10-21 RX ADMIN — INSULIN LISPRO 2 UNITS: 100 INJECTION, SOLUTION INTRAVENOUS; SUBCUTANEOUS at 21:01

## 2019-10-21 RX ADMIN — ACETAMINOPHEN 1000 MG: 500 TABLET, FILM COATED ORAL at 15:56

## 2019-10-21 RX ADMIN — PHENYLEPHRINE HYDROCHLORIDE 160 MCG: 10 INJECTION INTRAVENOUS at 10:56

## 2019-10-21 RX ADMIN — PRAVASTATIN SODIUM 40 MG: 40 TABLET ORAL at 17:51

## 2019-10-21 RX ADMIN — PHENYLEPHRINE HYDROCHLORIDE 160 MCG: 10 INJECTION INTRAVENOUS at 11:53

## 2019-10-21 RX ADMIN — Medication 10 MG: at 10:55

## 2019-10-21 RX ADMIN — CEFAZOLIN SODIUM 2 G: 2 INJECTION, SOLUTION INTRAVENOUS at 17:50

## 2019-10-21 ASSESSMENT — PAIN SCALES - GENERAL
PAINLEVEL_OUTOF10: 3
PAINLEVEL_OUTOF10: 0
PAINLEVEL_OUTOF10: 0
PAINLEVEL_OUTOF10: 3
PAINLEVEL_OUTOF10: 0

## 2019-10-21 ASSESSMENT — PULMONARY FUNCTION TESTS
PIF_VALUE: 1
PIF_VALUE: 0
PIF_VALUE: 0
PIF_VALUE: 1
PIF_VALUE: 1
PIF_VALUE: 0
PIF_VALUE: 1
PIF_VALUE: 0
PIF_VALUE: 1
PIF_VALUE: 0
PIF_VALUE: 1
PIF_VALUE: 0
PIF_VALUE: 1
PIF_VALUE: 0
PIF_VALUE: 1
PIF_VALUE: 0
PIF_VALUE: 0
PIF_VALUE: 1
PIF_VALUE: 0
PIF_VALUE: 1
PIF_VALUE: 0
PIF_VALUE: 1
PIF_VALUE: 0
PIF_VALUE: 1
PIF_VALUE: 0
PIF_VALUE: 1
PIF_VALUE: 0
PIF_VALUE: 1
PIF_VALUE: 1
PIF_VALUE: 0
PIF_VALUE: 1

## 2019-10-21 ASSESSMENT — PAIN DESCRIPTION - LOCATION: LOCATION: KNEE

## 2019-10-21 ASSESSMENT — PAIN DESCRIPTION - ORIENTATION: ORIENTATION: RIGHT

## 2019-10-21 ASSESSMENT — PAIN - FUNCTIONAL ASSESSMENT
PAIN_FUNCTIONAL_ASSESSMENT: PREVENTS OR INTERFERES SOME ACTIVE ACTIVITIES AND ADLS
PAIN_FUNCTIONAL_ASSESSMENT: 0-10

## 2019-10-21 ASSESSMENT — PAIN DESCRIPTION - ONSET: ONSET: ON-GOING

## 2019-10-21 ASSESSMENT — PAIN DESCRIPTION - PAIN TYPE
TYPE: SURGICAL PAIN
TYPE: SURGICAL PAIN

## 2019-10-21 ASSESSMENT — PAIN DESCRIPTION - FREQUENCY: FREQUENCY: CONTINUOUS

## 2019-10-21 ASSESSMENT — PAIN DESCRIPTION - DESCRIPTORS
DESCRIPTORS: ACHING
DESCRIPTORS: ACHING

## 2019-10-22 VITALS
DIASTOLIC BLOOD PRESSURE: 75 MMHG | WEIGHT: 219 LBS | BODY MASS INDEX: 35.2 KG/M2 | TEMPERATURE: 97.5 F | HEIGHT: 66 IN | HEART RATE: 55 BPM | OXYGEN SATURATION: 95 % | RESPIRATION RATE: 16 BRPM | SYSTOLIC BLOOD PRESSURE: 127 MMHG

## 2019-10-22 LAB
ANION GAP SERPL CALCULATED.3IONS-SCNC: 11 MMOL/L (ref 3–16)
BUN BLDV-MCNC: 33 MG/DL (ref 7–20)
CALCIUM SERPL-MCNC: 9.2 MG/DL (ref 8.3–10.6)
CHLORIDE BLD-SCNC: 102 MMOL/L (ref 99–110)
CO2: 22 MMOL/L (ref 21–32)
CREAT SERPL-MCNC: 1.3 MG/DL (ref 0.6–1.2)
GFR AFRICAN AMERICAN: 48
GFR NON-AFRICAN AMERICAN: 40
GLUCOSE BLD-MCNC: 151 MG/DL (ref 70–99)
GLUCOSE BLD-MCNC: 156 MG/DL (ref 70–99)
GLUCOSE BLD-MCNC: 160 MG/DL (ref 70–99)
HCT VFR BLD CALC: 33.8 % (ref 36–48)
HEMOGLOBIN: 11.4 G/DL (ref 12–16)
MCH RBC QN AUTO: 32.5 PG (ref 26–34)
MCHC RBC AUTO-ENTMCNC: 33.9 G/DL (ref 31–36)
MCV RBC AUTO: 96.1 FL (ref 80–100)
PDW BLD-RTO: 14.5 % (ref 12.4–15.4)
PERFORMED ON: ABNORMAL
PERFORMED ON: ABNORMAL
PLATELET # BLD: 284 K/UL (ref 135–450)
PMV BLD AUTO: 7.8 FL (ref 5–10.5)
POTASSIUM SERPL-SCNC: 4.8 MMOL/L (ref 3.5–5.1)
RBC # BLD: 3.51 M/UL (ref 4–5.2)
SODIUM BLD-SCNC: 135 MMOL/L (ref 136–145)
WBC # BLD: 15.7 K/UL (ref 4–11)

## 2019-10-22 PROCEDURE — 2580000003 HC RX 258: Performed by: ORTHOPAEDIC SURGERY

## 2019-10-22 PROCEDURE — 99024 POSTOP FOLLOW-UP VISIT: CPT | Performed by: NURSE PRACTITIONER

## 2019-10-22 PROCEDURE — 80048 BASIC METABOLIC PNL TOTAL CA: CPT

## 2019-10-22 PROCEDURE — 97116 GAIT TRAINING THERAPY: CPT

## 2019-10-22 PROCEDURE — 85027 COMPLETE CBC AUTOMATED: CPT

## 2019-10-22 PROCEDURE — 6360000002 HC RX W HCPCS: Performed by: ORTHOPAEDIC SURGERY

## 2019-10-22 PROCEDURE — 6370000000 HC RX 637 (ALT 250 FOR IP): Performed by: ORTHOPAEDIC SURGERY

## 2019-10-22 PROCEDURE — 97110 THERAPEUTIC EXERCISES: CPT

## 2019-10-22 PROCEDURE — 6370000000 HC RX 637 (ALT 250 FOR IP): Performed by: NURSE PRACTITIONER

## 2019-10-22 PROCEDURE — 97530 THERAPEUTIC ACTIVITIES: CPT

## 2019-10-22 RX ORDER — HYDROCODONE BITARTRATE AND ACETAMINOPHEN 5; 325 MG/1; MG/1
1 TABLET ORAL EVERY 4 HOURS PRN
Qty: 30 TABLET | Refills: 0 | Status: SHIPPED | OUTPATIENT
Start: 2019-10-22 | End: 2019-10-29

## 2019-10-22 RX ORDER — HYDROCODONE BITARTRATE AND ACETAMINOPHEN 5; 325 MG/1; MG/1
1 TABLET ORAL EVERY 4 HOURS PRN
Status: DISCONTINUED | OUTPATIENT
Start: 2019-10-22 | End: 2019-10-22 | Stop reason: HOSPADM

## 2019-10-22 RX ADMIN — CEFAZOLIN SODIUM 2 G: 2 INJECTION, SOLUTION INTRAVENOUS at 02:56

## 2019-10-22 RX ADMIN — LISINOPRIL 10 MG: 10 TABLET ORAL at 08:59

## 2019-10-22 RX ADMIN — INSULIN LISPRO 2 UNITS: 100 INJECTION, SOLUTION INTRAVENOUS; SUBCUTANEOUS at 11:53

## 2019-10-22 RX ADMIN — ACETAMINOPHEN 1000 MG: 500 TABLET, FILM COATED ORAL at 08:57

## 2019-10-22 RX ADMIN — OXYCODONE HYDROCHLORIDE 10 MG: 5 TABLET ORAL at 05:22

## 2019-10-22 RX ADMIN — Medication 10 ML: at 08:59

## 2019-10-22 RX ADMIN — ASPIRIN 325 MG: 325 TABLET, DELAYED RELEASE ORAL at 08:57

## 2019-10-22 RX ADMIN — DEXAMETHASONE SODIUM PHOSPHATE 10 MG: 4 INJECTION, SOLUTION INTRAMUSCULAR; INTRAVENOUS at 10:23

## 2019-10-22 RX ADMIN — INSULIN LISPRO 2 UNITS: 100 INJECTION, SOLUTION INTRAVENOUS; SUBCUTANEOUS at 08:57

## 2019-10-22 RX ADMIN — DOCUSATE SODIUM 100 MG: 100 CAPSULE ORAL at 08:58

## 2019-10-22 RX ADMIN — SERTRALINE 150 MG: 50 TABLET, FILM COATED ORAL at 08:57

## 2019-10-22 RX ADMIN — HYDROCODONE BITARTRATE AND ACETAMINOPHEN 1 TABLET: 5; 325 TABLET ORAL at 10:23

## 2019-10-22 ASSESSMENT — PAIN SCALES - GENERAL
PAINLEVEL_OUTOF10: 7
PAINLEVEL_OUTOF10: 5
PAINLEVEL_OUTOF10: 7
PAINLEVEL_OUTOF10: 0
PAINLEVEL_OUTOF10: 4
PAINLEVEL_OUTOF10: 7
PAINLEVEL_OUTOF10: 5

## 2019-10-22 ASSESSMENT — PAIN DESCRIPTION - DESCRIPTORS: DESCRIPTORS: ACHING

## 2019-10-22 ASSESSMENT — PAIN DESCRIPTION - PAIN TYPE
TYPE: SURGICAL PAIN
TYPE: SURGICAL PAIN

## 2019-10-22 ASSESSMENT — PAIN DESCRIPTION - LOCATION: LOCATION: KNEE

## 2019-10-22 ASSESSMENT — PAIN DESCRIPTION - ORIENTATION: ORIENTATION: RIGHT

## 2019-10-23 ENCOUNTER — TELEPHONE (OUTPATIENT)
Dept: ORTHOPEDIC SURGERY | Age: 74
End: 2019-10-23

## 2019-11-06 ENCOUNTER — OFFICE VISIT (OUTPATIENT)
Dept: ORTHOPEDIC SURGERY | Age: 74
End: 2019-11-06

## 2019-11-06 VITALS — HEIGHT: 66 IN | BODY MASS INDEX: 35.18 KG/M2 | WEIGHT: 218.92 LBS

## 2019-11-06 DIAGNOSIS — M17.11 PRIMARY OSTEOARTHRITIS OF RIGHT KNEE: Primary | ICD-10-CM

## 2019-11-06 PROCEDURE — 99024 POSTOP FOLLOW-UP VISIT: CPT | Performed by: ORTHOPAEDIC SURGERY

## 2019-11-07 ENCOUNTER — APPOINTMENT (OUTPATIENT)
Dept: PHYSICAL THERAPY | Age: 74
End: 2019-11-07
Payer: MEDICARE

## 2019-11-11 ENCOUNTER — APPOINTMENT (OUTPATIENT)
Dept: PHYSICAL THERAPY | Age: 74
End: 2019-11-11
Payer: MEDICARE

## 2019-11-12 ENCOUNTER — HOSPITAL ENCOUNTER (OUTPATIENT)
Dept: PHYSICAL THERAPY | Age: 74
Setting detail: THERAPIES SERIES
Discharge: HOME | End: 2019-11-12
Payer: MEDICARE

## 2019-11-12 PROCEDURE — 97110 THERAPEUTIC EXERCISES: CPT

## 2019-11-12 PROCEDURE — 97140 MANUAL THERAPY 1/> REGIONS: CPT

## 2019-11-12 PROCEDURE — 97164 PT RE-EVAL EST PLAN CARE: CPT

## 2019-11-14 ENCOUNTER — HOSPITAL ENCOUNTER (OUTPATIENT)
Dept: PHYSICAL THERAPY | Age: 74
Setting detail: THERAPIES SERIES
Discharge: HOME | End: 2019-11-14
Payer: MEDICARE

## 2019-11-14 PROCEDURE — 97110 THERAPEUTIC EXERCISES: CPT

## 2019-11-14 PROCEDURE — 97016 VASOPNEUMATIC DEVICE THERAPY: CPT

## 2019-11-14 PROCEDURE — 97140 MANUAL THERAPY 1/> REGIONS: CPT

## 2019-11-15 RX ORDER — AMOXICILLIN 500 MG/1
CAPSULE ORAL
Qty: 4 CAPSULE | Refills: 0 | Status: SHIPPED | OUTPATIENT
Start: 2019-11-15 | End: 2022-05-11 | Stop reason: ALTCHOICE

## 2019-11-19 ENCOUNTER — OFFICE VISIT (OUTPATIENT)
Dept: FAMILY MEDICINE CLINIC | Age: 74
End: 2019-11-19
Payer: MEDICARE

## 2019-11-19 VITALS
HEART RATE: 67 BPM | WEIGHT: 222 LBS | DIASTOLIC BLOOD PRESSURE: 60 MMHG | BODY MASS INDEX: 35.85 KG/M2 | SYSTOLIC BLOOD PRESSURE: 135 MMHG | OXYGEN SATURATION: 97 %

## 2019-11-19 DIAGNOSIS — I10 ESSENTIAL HYPERTENSION: ICD-10-CM

## 2019-11-19 DIAGNOSIS — M17.10 PRIMARY OSTEOARTHRITIS OF KNEE, UNSPECIFIED LATERALITY: ICD-10-CM

## 2019-11-19 DIAGNOSIS — E11.8 TYPE 2 DIABETES MELLITUS WITH COMPLICATION, WITHOUT LONG-TERM CURRENT USE OF INSULIN (HCC): Primary | ICD-10-CM

## 2019-11-19 LAB
CHP ED QC CHECK: NORMAL
GLUCOSE BLD-MCNC: 147 MG/DL

## 2019-11-19 PROCEDURE — 99214 OFFICE O/P EST MOD 30 MIN: CPT | Performed by: FAMILY MEDICINE

## 2019-11-19 PROCEDURE — 82962 GLUCOSE BLOOD TEST: CPT | Performed by: FAMILY MEDICINE

## 2019-11-20 ENCOUNTER — HOSPITAL ENCOUNTER (OUTPATIENT)
Dept: PHYSICAL THERAPY | Age: 74
Setting detail: THERAPIES SERIES
Discharge: HOME | End: 2019-11-20
Payer: MEDICARE

## 2019-11-20 PROCEDURE — 97140 MANUAL THERAPY 1/> REGIONS: CPT

## 2019-11-20 PROCEDURE — 97110 THERAPEUTIC EXERCISES: CPT

## 2019-11-20 ASSESSMENT — ENCOUNTER SYMPTOMS
ABDOMINAL PAIN: 0
COUGH: 0
CHEST TIGHTNESS: 0
SHORTNESS OF BREATH: 0

## 2019-11-22 ENCOUNTER — HOSPITAL ENCOUNTER (OUTPATIENT)
Dept: PHYSICAL THERAPY | Age: 74
Setting detail: THERAPIES SERIES
Discharge: HOME | End: 2019-11-22
Payer: MEDICARE

## 2019-11-22 DIAGNOSIS — M17.11 PRIMARY OSTEOARTHRITIS OF RIGHT KNEE: Primary | ICD-10-CM

## 2019-11-22 PROCEDURE — 97140 MANUAL THERAPY 1/> REGIONS: CPT

## 2019-11-22 PROCEDURE — 97110 THERAPEUTIC EXERCISES: CPT

## 2019-11-22 RX ORDER — AMOXICILLIN 500 MG/1
CAPSULE ORAL
Qty: 4 CAPSULE | Refills: 3 | Status: SHIPPED | OUTPATIENT
Start: 2019-11-22 | End: 2022-05-11 | Stop reason: ALTCHOICE

## 2019-11-25 ENCOUNTER — HOSPITAL ENCOUNTER (OUTPATIENT)
Dept: PHYSICAL THERAPY | Age: 74
Setting detail: THERAPIES SERIES
Discharge: HOME | End: 2019-11-25
Payer: MEDICARE

## 2019-11-25 PROCEDURE — 97140 MANUAL THERAPY 1/> REGIONS: CPT

## 2019-11-25 PROCEDURE — 97110 THERAPEUTIC EXERCISES: CPT

## 2019-11-29 ENCOUNTER — HOSPITAL ENCOUNTER (OUTPATIENT)
Dept: PHYSICAL THERAPY | Age: 74
Setting detail: THERAPIES SERIES
Discharge: HOME | End: 2019-11-29
Payer: MEDICARE

## 2019-11-29 PROCEDURE — 97110 THERAPEUTIC EXERCISES: CPT

## 2019-11-29 PROCEDURE — 97140 MANUAL THERAPY 1/> REGIONS: CPT

## 2019-12-04 ENCOUNTER — OFFICE VISIT (OUTPATIENT)
Dept: ORTHOPEDIC SURGERY | Age: 74
End: 2019-12-04

## 2019-12-04 ENCOUNTER — HOSPITAL ENCOUNTER (OUTPATIENT)
Dept: PHYSICAL THERAPY | Age: 74
Setting detail: THERAPIES SERIES
Discharge: HOME | End: 2019-12-04
Payer: MEDICARE

## 2019-12-04 DIAGNOSIS — M17.11 PRIMARY OSTEOARTHRITIS OF RIGHT KNEE: Primary | ICD-10-CM

## 2019-12-04 PROCEDURE — 97110 THERAPEUTIC EXERCISES: CPT

## 2019-12-04 PROCEDURE — 99024 POSTOP FOLLOW-UP VISIT: CPT | Performed by: ORTHOPAEDIC SURGERY

## 2019-12-04 PROCEDURE — 97140 MANUAL THERAPY 1/> REGIONS: CPT

## 2019-12-06 ENCOUNTER — HOSPITAL ENCOUNTER (OUTPATIENT)
Dept: PHYSICAL THERAPY | Age: 74
Setting detail: THERAPIES SERIES
Discharge: HOME | End: 2019-12-06
Payer: MEDICARE

## 2019-12-06 PROCEDURE — 97140 MANUAL THERAPY 1/> REGIONS: CPT

## 2019-12-06 PROCEDURE — 97110 THERAPEUTIC EXERCISES: CPT

## 2019-12-11 ENCOUNTER — HOSPITAL ENCOUNTER (OUTPATIENT)
Dept: PHYSICAL THERAPY | Age: 74
Setting detail: THERAPIES SERIES
Discharge: HOME | End: 2019-12-11
Payer: MEDICARE

## 2019-12-11 PROCEDURE — 97140 MANUAL THERAPY 1/> REGIONS: CPT

## 2019-12-11 PROCEDURE — 97530 THERAPEUTIC ACTIVITIES: CPT

## 2019-12-11 PROCEDURE — 97110 THERAPEUTIC EXERCISES: CPT

## 2019-12-13 ENCOUNTER — HOSPITAL ENCOUNTER (OUTPATIENT)
Dept: PHYSICAL THERAPY | Age: 74
Setting detail: THERAPIES SERIES
Discharge: HOME | End: 2019-12-13
Payer: MEDICARE

## 2019-12-13 PROCEDURE — 97530 THERAPEUTIC ACTIVITIES: CPT

## 2019-12-13 PROCEDURE — 97140 MANUAL THERAPY 1/> REGIONS: CPT

## 2019-12-13 PROCEDURE — 97110 THERAPEUTIC EXERCISES: CPT

## 2019-12-19 ENCOUNTER — HOSPITAL ENCOUNTER (OUTPATIENT)
Dept: PHYSICAL THERAPY | Age: 74
Setting detail: THERAPIES SERIES
Discharge: HOME | End: 2019-12-19
Payer: MEDICARE

## 2019-12-19 PROCEDURE — 97140 MANUAL THERAPY 1/> REGIONS: CPT

## 2019-12-19 PROCEDURE — 97110 THERAPEUTIC EXERCISES: CPT

## 2019-12-19 PROCEDURE — 97530 THERAPEUTIC ACTIVITIES: CPT

## 2019-12-24 ENCOUNTER — HOSPITAL ENCOUNTER (OUTPATIENT)
Dept: PHYSICAL THERAPY | Age: 74
Setting detail: THERAPIES SERIES
Discharge: HOME | End: 2019-12-24
Payer: MEDICARE

## 2019-12-24 PROCEDURE — 97110 THERAPEUTIC EXERCISES: CPT

## 2019-12-24 PROCEDURE — 97530 THERAPEUTIC ACTIVITIES: CPT

## 2020-01-06 ENCOUNTER — OFFICE VISIT (OUTPATIENT)
Dept: FAMILY MEDICINE CLINIC | Age: 75
End: 2020-01-06
Payer: MEDICARE

## 2020-01-06 VITALS
BODY MASS INDEX: 36.01 KG/M2 | DIASTOLIC BLOOD PRESSURE: 60 MMHG | SYSTOLIC BLOOD PRESSURE: 140 MMHG | OXYGEN SATURATION: 96 % | HEART RATE: 59 BPM | WEIGHT: 223 LBS

## 2020-01-06 LAB
BILIRUBIN, POC: NORMAL
BLOOD URINE, POC: NORMAL
CHP ED QC CHECK: NORMAL
CLARITY, POC: CLEAR
COLOR, POC: YELLOW
GLUCOSE BLD-MCNC: 77 MG/DL
GLUCOSE URINE, POC: NORMAL
KETONES, POC: NORMAL
LEUKOCYTE EST, POC: NORMAL
NITRITE, POC: NORMAL
PH, POC: 5.5
PROTEIN, POC: NORMAL
SPECIFIC GRAVITY, POC: 1.02
UROBILINOGEN, POC: 0.2

## 2020-01-06 PROCEDURE — 82962 GLUCOSE BLOOD TEST: CPT | Performed by: FAMILY MEDICINE

## 2020-01-06 PROCEDURE — 81002 URINALYSIS NONAUTO W/O SCOPE: CPT | Performed by: FAMILY MEDICINE

## 2020-01-06 PROCEDURE — 99214 OFFICE O/P EST MOD 30 MIN: CPT | Performed by: FAMILY MEDICINE

## 2020-01-06 RX ORDER — SERTRALINE HYDROCHLORIDE 100 MG/1
TABLET, FILM COATED ORAL
Qty: 135 TABLET | Refills: 0 | Status: SHIPPED | OUTPATIENT
Start: 2020-01-06 | End: 2020-04-06 | Stop reason: SDUPTHER

## 2020-01-06 RX ORDER — BISOPROLOL FUMARATE AND HYDROCHLOROTHIAZIDE 10; 6.25 MG/1; MG/1
1 TABLET ORAL DAILY
Qty: 90 TABLET | Refills: 0 | Status: SHIPPED | OUTPATIENT
Start: 2020-01-06 | End: 2020-04-06 | Stop reason: SDUPTHER

## 2020-01-06 RX ORDER — LISINOPRIL 10 MG/1
10 TABLET ORAL DAILY
Qty: 90 TABLET | Refills: 0 | Status: SHIPPED | OUTPATIENT
Start: 2020-01-06 | End: 2020-04-06 | Stop reason: SDUPTHER

## 2020-01-06 RX ORDER — PRAVASTATIN SODIUM 40 MG
40 TABLET ORAL DAILY
Qty: 90 TABLET | Refills: 0 | Status: SHIPPED | OUTPATIENT
Start: 2020-01-06 | End: 2020-04-06 | Stop reason: SDUPTHER

## 2020-01-06 RX ORDER — GLIPIZIDE 5 MG/1
5 TABLET, FILM COATED, EXTENDED RELEASE ORAL DAILY
Qty: 90 TABLET | Refills: 0 | Status: SHIPPED | OUTPATIENT
Start: 2020-01-06 | End: 2020-07-27

## 2020-01-07 DIAGNOSIS — E78.5 HYPERLIPIDEMIA, UNSPECIFIED HYPERLIPIDEMIA TYPE: ICD-10-CM

## 2020-01-07 DIAGNOSIS — E11.8 TYPE 2 DIABETES MELLITUS WITH COMPLICATION, WITHOUT LONG-TERM CURRENT USE OF INSULIN (HCC): ICD-10-CM

## 2020-01-07 LAB
A/G RATIO: 1.6 (ref 1.1–2.2)
ALBUMIN SERPL-MCNC: 4.2 G/DL (ref 3.4–5)
ALP BLD-CCNC: 113 U/L (ref 40–129)
ALT SERPL-CCNC: 9 U/L (ref 10–40)
ANION GAP SERPL CALCULATED.3IONS-SCNC: 15 MMOL/L (ref 3–16)
AST SERPL-CCNC: 13 U/L (ref 15–37)
BASOPHILS ABSOLUTE: 0 K/UL (ref 0–0.2)
BASOPHILS RELATIVE PERCENT: 0.4 %
BILIRUB SERPL-MCNC: 0.3 MG/DL (ref 0–1)
BUN BLDV-MCNC: 33 MG/DL (ref 7–20)
CALCIUM SERPL-MCNC: 9.5 MG/DL (ref 8.3–10.6)
CHLORIDE BLD-SCNC: 101 MMOL/L (ref 99–110)
CHOLESTEROL, TOTAL: 149 MG/DL (ref 0–199)
CO2: 24 MMOL/L (ref 21–32)
CREAT SERPL-MCNC: 1.5 MG/DL (ref 0.6–1.2)
EOSINOPHILS ABSOLUTE: 0.2 K/UL (ref 0–0.6)
EOSINOPHILS RELATIVE PERCENT: 1.7 %
GFR AFRICAN AMERICAN: 41
GFR NON-AFRICAN AMERICAN: 34
GLOBULIN: 2.6 G/DL
GLUCOSE BLD-MCNC: 94 MG/DL (ref 70–99)
HCT VFR BLD CALC: 37.6 % (ref 36–48)
HDLC SERPL-MCNC: 47 MG/DL (ref 40–60)
HEMOGLOBIN: 12.6 G/DL (ref 12–16)
LDL CHOLESTEROL CALCULATED: 66 MG/DL
LYMPHOCYTES ABSOLUTE: 0.9 K/UL (ref 1–5.1)
LYMPHOCYTES RELATIVE PERCENT: 8.1 %
MCH RBC QN AUTO: 31.4 PG (ref 26–34)
MCHC RBC AUTO-ENTMCNC: 33.5 G/DL (ref 31–36)
MCV RBC AUTO: 93.6 FL (ref 80–100)
MONOCYTES ABSOLUTE: 0.4 K/UL (ref 0–1.3)
MONOCYTES RELATIVE PERCENT: 3.7 %
NEUTROPHILS ABSOLUTE: 9.1 K/UL (ref 1.7–7.7)
NEUTROPHILS RELATIVE PERCENT: 86.1 %
PDW BLD-RTO: 15.1 % (ref 12.4–15.4)
PLATELET # BLD: 273 K/UL (ref 135–450)
PMV BLD AUTO: 7.8 FL (ref 5–10.5)
POTASSIUM SERPL-SCNC: 4.7 MMOL/L (ref 3.5–5.1)
RBC # BLD: 4.02 M/UL (ref 4–5.2)
SODIUM BLD-SCNC: 140 MMOL/L (ref 136–145)
TOTAL CK: 30 U/L (ref 26–192)
TOTAL PROTEIN: 6.8 G/DL (ref 6.4–8.2)
TRIGL SERPL-MCNC: 179 MG/DL (ref 0–150)
VLDLC SERPL CALC-MCNC: 36 MG/DL
WBC # BLD: 10.6 K/UL (ref 4–11)

## 2020-01-08 LAB
ESTIMATED AVERAGE GLUCOSE: 96.8 MG/DL
HBA1C MFR BLD: 5 %

## 2020-01-12 ASSESSMENT — ENCOUNTER SYMPTOMS
COUGH: 0
ABDOMINAL PAIN: 0
BLOOD IN STOOL: 0
SHORTNESS OF BREATH: 0
CHEST TIGHTNESS: 0

## 2020-01-12 NOTE — PROGRESS NOTES
SUBJECTIVE:  Chloe Da Silva   1945   female   No Known Allergies    Chief Complaint   Patient presents with    3 Month Follow-Up        Patient Active Problem List    Diagnosis Date Noted    10/21/19 RIGHT TKA 10/21/2019    Encounter for postoperative care 10/21/2019    Prerenal azotemia 10/21/2019    Primary osteoarthritis of left knee 01/02/2019    Idiopathic chronic gout of left foot without tophus 10/31/2017    Type 2 diabetes mellitus with complication, without long-term current use of insulin ([facility] [address] 08/07/2016    Depression 03/04/2015    Ventral hernia 06/20/2014    Morbid obesity ([facility] [address] 06/20/2014    Macular degeneration 06/20/2014    Hypertension     Osteopenia     Hyperlipidemia        HPI   Pt is here today for fu on diabetes, HTN, hyprlipidemia, OA, depression, CRD, and hx of gout. She is s/p knee replacement and has been very well. She has been DC from PT and stretching at home. she has been doing well on current tx mgt. Denies CP,SOB or myalgias. She is currently followed by nephrology for CRD. No recent changes to tx or new recommendations. Has not had any trouble with gout.    Past Medical History:   Diagnosis Date    CRD (chronic renal disease)     Depression     Herpes dermatitis     left thigh    Hyperlipidemia     Hypertension     Macular degeneration     Obesity     Osteopenia     Squamous acanthoma of face     Type II or unspecified type diabetes mellitus without mention of complication, not stated as uncontrolled      Social History     Socioeconomic History    Marital status:      Spouse name: Not on file    Number of children: Not on file    Years of education: Not on file    Highest education level: Not on file   Occupational History    Not on file   Social Needs    Financial resource strain: Not on file    Food insecurity:     Worry: Not on file     Inability: Not on file    Transportation needs:     Medical: Not on file     Non-medical: Not on file Tobacco Use    Smoking status: Never Smoker    Smokeless tobacco: Never Used   Substance and Sexual Activity    Alcohol use: No     Alcohol/week: 0.0 standard drinks    Drug use: No    Sexual activity: Not on file   Lifestyle    Physical activity:     Days per week: Not on file     Minutes per session: Not on file    Stress: Not on file   Relationships    Social connections:     Talks on phone: Not on file     Gets together: Not on file     Attends Hoahaoism service: Not on file     Active member of club or organization: Not on file     Attends meetings of clubs or organizations: Not on file     Relationship status: Not on file    Intimate partner violence:     Fear of current or ex partner: Not on file     Emotionally abused: Not on file     Physically abused: Not on file     Forced sexual activity: Not on file   Other Topics Concern    Not on file   Social History Narrative    Not on file     Family History   Problem Relation Age of Onset    Heart Disease Mother     Alcohol Abuse Mother     Migraines Mother     Heart Disease Father     Stroke Father     Hypertension Father     Alcohol Abuse Other     Migraines Other         Review of Systems   Constitutional: Negative for activity change, appetite change and unexpected weight change. Respiratory: Negative for cough, chest tightness and shortness of breath. Cardiovascular: Negative for chest pain, palpitations and leg swelling. Gastrointestinal: Negative for abdominal pain and blood in stool. Musculoskeletal: Negative for arthralgias and myalgias. Skin: Negative for rash. Neurological: Negative for light-headedness and headaches. Hematological: Negative for adenopathy. Does not bruise/bleed easily. Psychiatric/Behavioral: Negative for dysphoric mood, sleep disturbance and suicidal ideas. The patient is not nervous/anxious.         OBJECTIVE:  BP (!) 140/60   Pulse 59   Wt 223 lb (101.2 kg)   SpO2 96%   BMI 36.01 kg/m²

## 2020-01-13 ENCOUNTER — TELEPHONE (OUTPATIENT)
Dept: FAMILY MEDICINE CLINIC | Age: 75
End: 2020-01-13

## 2020-01-15 ENCOUNTER — OFFICE VISIT (OUTPATIENT)
Dept: ORTHOPEDIC SURGERY | Age: 75
End: 2020-01-15

## 2020-01-15 VITALS — HEIGHT: 66 IN | WEIGHT: 223.11 LBS | RESPIRATION RATE: 17 BRPM | BODY MASS INDEX: 35.86 KG/M2

## 2020-01-15 PROCEDURE — 99024 POSTOP FOLLOW-UP VISIT: CPT | Performed by: ORTHOPAEDIC SURGERY

## 2020-01-15 NOTE — PROGRESS NOTES
placed in this encounter. Treatment Plan: She is doing well at this time. I did encourage her to continue on with low impact exercise and she is walking on a daily basis.   I will see her back in clinic at 1 year postoperatively with a repeat x-ray of the right knee

## 2020-02-04 ENCOUNTER — TELEPHONE (OUTPATIENT)
Dept: FAMILY MEDICINE CLINIC | Age: 75
End: 2020-02-04

## 2020-04-02 RX ORDER — PRAVASTATIN SODIUM 40 MG
TABLET ORAL
Qty: 90 TABLET | Refills: 0 | OUTPATIENT
Start: 2020-04-02

## 2020-04-02 RX ORDER — LISINOPRIL 10 MG/1
TABLET ORAL
Qty: 90 TABLET | Refills: 0 | OUTPATIENT
Start: 2020-04-02

## 2020-04-02 RX ORDER — SITAGLIPTIN 100 MG/1
TABLET, FILM COATED ORAL
Qty: 90 TABLET | Refills: 0 | OUTPATIENT
Start: 2020-04-02

## 2020-04-02 RX ORDER — BISOPROLOL FUMARATE AND HYDROCHLOROTHIAZIDE 10; 6.25 MG/1; MG/1
TABLET ORAL
Qty: 90 TABLET | Refills: 0 | OUTPATIENT
Start: 2020-04-02

## 2020-04-03 ENCOUNTER — TELEPHONE (OUTPATIENT)
Dept: FAMILY MEDICINE CLINIC | Age: 75
End: 2020-04-03

## 2020-04-03 LAB
A/G RATIO: 1.7 (ref 1.1–2.2)
ALBUMIN SERPL-MCNC: 4.3 G/DL (ref 3.4–5)
ALP BLD-CCNC: 118 U/L (ref 40–129)
ALT SERPL-CCNC: 10 U/L (ref 10–40)
ANION GAP SERPL CALCULATED.3IONS-SCNC: 16 MMOL/L (ref 3–16)
AST SERPL-CCNC: 14 U/L (ref 15–37)
BASOPHILS ABSOLUTE: 0 K/UL (ref 0–0.2)
BASOPHILS RELATIVE PERCENT: 0.4 %
BILIRUB SERPL-MCNC: 0.5 MG/DL (ref 0–1)
BUN BLDV-MCNC: 30 MG/DL (ref 7–20)
CALCIUM SERPL-MCNC: 9.6 MG/DL (ref 8.3–10.6)
CHLORIDE BLD-SCNC: 103 MMOL/L (ref 99–110)
CHOLESTEROL, TOTAL: 161 MG/DL (ref 0–199)
CO2: 24 MMOL/L (ref 21–32)
CREAT SERPL-MCNC: 1.2 MG/DL (ref 0.6–1.2)
EOSINOPHILS ABSOLUTE: 0.2 K/UL (ref 0–0.6)
EOSINOPHILS RELATIVE PERCENT: 1.5 %
GFR AFRICAN AMERICAN: 53
GFR NON-AFRICAN AMERICAN: 44
GLOBULIN: 2.5 G/DL
GLUCOSE BLD-MCNC: 135 MG/DL (ref 70–99)
HCT VFR BLD CALC: 37.5 % (ref 36–48)
HDLC SERPL-MCNC: 50 MG/DL (ref 40–60)
HEMOGLOBIN: 13.1 G/DL (ref 12–16)
LDL CHOLESTEROL CALCULATED: 77 MG/DL
LYMPHOCYTES ABSOLUTE: 1 K/UL (ref 1–5.1)
LYMPHOCYTES RELATIVE PERCENT: 9.6 %
MCH RBC QN AUTO: 31.4 PG (ref 26–34)
MCHC RBC AUTO-ENTMCNC: 34.8 G/DL (ref 31–36)
MCV RBC AUTO: 90.2 FL (ref 80–100)
MONOCYTES ABSOLUTE: 0.4 K/UL (ref 0–1.3)
MONOCYTES RELATIVE PERCENT: 4.1 %
NEUTROPHILS ABSOLUTE: 8.6 K/UL (ref 1.7–7.7)
NEUTROPHILS RELATIVE PERCENT: 84.4 %
PDW BLD-RTO: 15.7 % (ref 12.4–15.4)
PLATELET # BLD: 265 K/UL (ref 135–450)
PMV BLD AUTO: 7.9 FL (ref 5–10.5)
POTASSIUM SERPL-SCNC: 4.9 MMOL/L (ref 3.5–5.1)
RBC # BLD: 4.16 M/UL (ref 4–5.2)
SODIUM BLD-SCNC: 143 MMOL/L (ref 136–145)
TOTAL CK: 29 U/L (ref 26–192)
TOTAL PROTEIN: 6.8 G/DL (ref 6.4–8.2)
TRIGL SERPL-MCNC: 171 MG/DL (ref 0–150)
VLDLC SERPL CALC-MCNC: 34 MG/DL
WBC # BLD: 10.2 K/UL (ref 4–11)

## 2020-04-04 LAB
ESTIMATED AVERAGE GLUCOSE: 119.8 MG/DL
HBA1C MFR BLD: 5.8 %

## 2020-04-06 ENCOUNTER — VIRTUAL VISIT (OUTPATIENT)
Dept: FAMILY MEDICINE CLINIC | Age: 75
End: 2020-04-06
Payer: MEDICARE

## 2020-04-06 PROCEDURE — 99214 OFFICE O/P EST MOD 30 MIN: CPT | Performed by: FAMILY MEDICINE

## 2020-04-06 RX ORDER — LISINOPRIL 10 MG/1
10 TABLET ORAL DAILY
Qty: 90 TABLET | Refills: 0 | Status: SHIPPED | OUTPATIENT
Start: 2020-04-06 | End: 2020-07-06 | Stop reason: SDUPTHER

## 2020-04-06 RX ORDER — PRAVASTATIN SODIUM 40 MG
40 TABLET ORAL DAILY
Qty: 90 TABLET | Refills: 0 | Status: SHIPPED | OUTPATIENT
Start: 2020-04-06 | End: 2020-07-06 | Stop reason: SDUPTHER

## 2020-04-06 RX ORDER — SERTRALINE HYDROCHLORIDE 100 MG/1
TABLET, FILM COATED ORAL
Qty: 135 TABLET | Refills: 0 | Status: SHIPPED | OUTPATIENT
Start: 2020-04-06 | End: 2020-07-06 | Stop reason: SDUPTHER

## 2020-04-06 RX ORDER — BISOPROLOL FUMARATE AND HYDROCHLOROTHIAZIDE 10; 6.25 MG/1; MG/1
1 TABLET ORAL DAILY
Qty: 90 TABLET | Refills: 0 | Status: SHIPPED | OUTPATIENT
Start: 2020-04-06 | End: 2020-07-06 | Stop reason: SDUPTHER

## 2020-04-06 ASSESSMENT — ENCOUNTER SYMPTOMS
ABDOMINAL PAIN: 0
CHEST TIGHTNESS: 0
SHORTNESS OF BREATH: 0
BLOOD IN STOOL: 0
COUGH: 0

## 2020-05-22 RX ORDER — AMOXICILLIN 500 MG/1
CAPSULE ORAL
Qty: 4 CAPSULE | Refills: 3 | Status: SHIPPED | OUTPATIENT
Start: 2020-05-22 | End: 2022-05-11 | Stop reason: ALTCHOICE

## 2020-06-15 RX ORDER — BISOPROLOL FUMARATE AND HYDROCHLOROTHIAZIDE 10; 6.25 MG/1; MG/1
TABLET ORAL
Qty: 90 TABLET | Refills: 0 | OUTPATIENT
Start: 2020-06-15

## 2020-06-15 RX ORDER — PRAVASTATIN SODIUM 40 MG
TABLET ORAL
Qty: 90 TABLET | Refills: 0 | OUTPATIENT
Start: 2020-06-15

## 2020-06-15 RX ORDER — LISINOPRIL 10 MG/1
TABLET ORAL
Qty: 90 TABLET | Refills: 0 | OUTPATIENT
Start: 2020-06-15

## 2020-07-06 ENCOUNTER — OFFICE VISIT (OUTPATIENT)
Dept: FAMILY MEDICINE CLINIC | Age: 75
End: 2020-07-06
Payer: MEDICARE

## 2020-07-06 VITALS
OXYGEN SATURATION: 96 % | BODY MASS INDEX: 36.01 KG/M2 | SYSTOLIC BLOOD PRESSURE: 140 MMHG | HEART RATE: 70 BPM | TEMPERATURE: 97.1 F | DIASTOLIC BLOOD PRESSURE: 80 MMHG | WEIGHT: 223 LBS

## 2020-07-06 LAB
CHP ED QC CHECK: NORMAL
GLUCOSE BLD-MCNC: 106 MG/DL

## 2020-07-06 PROCEDURE — 99214 OFFICE O/P EST MOD 30 MIN: CPT | Performed by: FAMILY MEDICINE

## 2020-07-06 PROCEDURE — 82962 GLUCOSE BLOOD TEST: CPT | Performed by: FAMILY MEDICINE

## 2020-07-06 RX ORDER — PRAVASTATIN SODIUM 40 MG
40 TABLET ORAL DAILY
Qty: 90 TABLET | Refills: 0 | Status: SHIPPED | OUTPATIENT
Start: 2020-07-06 | End: 2020-10-06 | Stop reason: SDUPTHER

## 2020-07-06 RX ORDER — BISOPROLOL FUMARATE AND HYDROCHLOROTHIAZIDE 10; 6.25 MG/1; MG/1
1 TABLET ORAL DAILY
Qty: 90 TABLET | Refills: 0 | Status: SHIPPED | OUTPATIENT
Start: 2020-07-06 | End: 2020-10-06 | Stop reason: SDUPTHER

## 2020-07-06 RX ORDER — LISINOPRIL 10 MG/1
10 TABLET ORAL DAILY
Qty: 90 TABLET | Refills: 0 | Status: SHIPPED | OUTPATIENT
Start: 2020-07-06 | End: 2020-10-06 | Stop reason: SDUPTHER

## 2020-07-06 RX ORDER — SERTRALINE HYDROCHLORIDE 100 MG/1
TABLET, FILM COATED ORAL
Qty: 135 TABLET | Refills: 0 | Status: SHIPPED | OUTPATIENT
Start: 2020-07-06 | End: 2020-10-06 | Stop reason: SDUPTHER

## 2020-07-12 ASSESSMENT — ENCOUNTER SYMPTOMS
CONSTIPATION: 0
SHORTNESS OF BREATH: 0
ABDOMINAL PAIN: 0
CHEST TIGHTNESS: 0
DIARRHEA: 0
COUGH: 0
BLOOD IN STOOL: 0

## 2020-07-12 NOTE — PROGRESS NOTES
Inability: Not on file    Transportation needs     Medical: Not on file     Non-medical: Not on file   Tobacco Use    Smoking status: Never Smoker    Smokeless tobacco: Never Used   Substance and Sexual Activity    Alcohol use: No     Alcohol/week: 0.0 standard drinks    Drug use: No    Sexual activity: Not on file   Lifestyle    Physical activity     Days per week: Not on file     Minutes per session: Not on file    Stress: Not on file   Relationships    Social connections     Talks on phone: Not on file     Gets together: Not on file     Attends Jehovah's witness service: Not on file     Active member of club or organization: Not on file     Attends meetings of clubs or organizations: Not on file     Relationship status: Not on file    Intimate partner violence     Fear of current or ex partner: Not on file     Emotionally abused: Not on file     Physically abused: Not on file     Forced sexual activity: Not on file   Other Topics Concern    Not on file   Social History Narrative    Not on file     Family History   Problem Relation Age of Onset    Heart Disease Mother     Alcohol Abuse Mother     Migraines Mother     Heart Disease Father     Stroke Father     Hypertension Father     Alcohol Abuse Other     Migraines Other        Review of Systems   Constitutional: Negative for activity change, appetite change and unexpected weight change. Respiratory: Negative for cough, chest tightness and shortness of breath. Cardiovascular: Negative for chest pain, palpitations and leg swelling. Gastrointestinal: Negative for abdominal pain, blood in stool, constipation and diarrhea. Musculoskeletal: Negative for arthralgias and myalgias. Skin: Negative for rash. Neurological: Negative for light-headedness and headaches. Hematological: Negative for adenopathy. Does not bruise/bleed easily. Psychiatric/Behavioral: Negative for dysphoric mood and sleep disturbance. The patient is not nervous/anxious. OBJECTIVE:  BP (!) 140/80   Pulse 70   Temp 97.1 °F (36.2 °C)   Wt 223 lb (101.2 kg)   SpO2 96%   BMI 36.01 kg/m²   Physical Exam  Constitutional:       Appearance: She is well-developed. Eyes:      Pupils: Pupils are equal, round, and reactive to light. Neck:      Musculoskeletal: Normal range of motion and neck supple. Thyroid: No thyromegaly. Vascular: No JVD. Cardiovascular:      Rate and Rhythm: Normal rate and regular rhythm. Pulmonary:      Effort: Pulmonary effort is normal.      Breath sounds: Normal breath sounds. Abdominal:      General: Bowel sounds are normal.      Palpations: Abdomen is soft. Tenderness: There is no abdominal tenderness. Skin:     Findings: No rash. Neurological:      Mental Status: She is alert and oriented to person, place, and time. Cranial Nerves: No cranial nerve deficit. Psychiatric:         Behavior: Behavior normal.         ASSESSMENT/PLAN:    1. Type 2 diabetes mellitus with complication, without long-term current use of insulin (HCC)  Refill meds  Intermittent ambulatory BP checks  labs  - POCT Glucose    2. Essential hypertension  Refill meds  Intermittent ambulatory BP checks  DASH diet reviewed    3. Hyperlipidemia, unspecified hyperlipidemia type  Continue Pravachol  Diet mgt  labs    4. Depression, unspecified depression type  Refill Zoloft  Stress mgt    5. Idiopathic gout involving toe of left foot, unspecified chronicity  Gout precautions  Continue prn mgt    6.  Chronic kidney disease, unspecified CKD stage  Continue fu with nephrology      Orders Placed This Encounter   Procedures    POCT Glucose     Current Outpatient Medications   Medication Sig Dispense Refill    bisoprolol-hydrochlorothiazide (ZIAC) 10-6.25 MG per tablet Take 1 tablet by mouth daily 90 tablet 0    lisinopril (PRINIVIL) 10 MG tablet Take 1 tablet by mouth daily 90 tablet 0    pravastatin (PRAVACHOL) 40 MG tablet Take 1 tablet by mouth daily 90 tablet 0    sertraline (ZOLOFT) 100 MG tablet 1 1/2 po qd 135 tablet 0    SITagliptin (JANUVIA) 100 MG tablet Take 1 tablet by mouth daily 90 tablet 0    amoxicillin (AMOXIL) 500 MG capsule 2 GRAMS ORALLY 1 HOUR PRIOR TO PROCEDURE 4 capsule 3    glipiZIDE (GLUCOTROL XL) 5 MG extended release tablet Take 1 tablet by mouth daily (Patient not taking: Reported on 4/6/2020) 90 tablet 0    amoxicillin (AMOXIL) 500 MG capsule 2 GRAMS ORALLY 1 HOUR PRIOR TO PROCEDURE 4 capsule 3    amoxicillin (AMOXIL) 500 MG capsule Take 4 caps orally 1 hour prior to dentist procedure 4 capsule 0    aspirin 325 MG EC tablet Take 1 tablet by mouth 2 times daily Take  mg. Twice daily x 14 days; then resume your normal home dose of ASA 28 tablet 3    acetaminophen (TYLENOL) 500 MG tablet Take 1,000 mg by mouth 2 times daily      indomethacin (INDOCIN) 50 MG capsule Take 1 capsule by mouth 2 times daily (with meals) (Patient taking differently: Take 50 mg by mouth as needed ) 20 capsule 0    bevacizumab (AVASTIN) 100 MG/4ML chemo injection inject 0.05mL intraocularly in affected eye.  Handicap Placard MISC by Does not apply route Osteoarthritis, knee pain  1/2019-1/24 1 each 0    Misc. Devices (WALKER) MISC 1 each by Does not apply route daily. Dispense and Fit for injury to left lower extremity and weakness. 1 each 0     No current facility-administered medications for this visit. Return in about 3 months (around 10/6/2020), or if symptoms worsen or fail to improve, for HTN, hyperlipidemia.     Jaren Crain MD

## 2020-07-31 DIAGNOSIS — E11.8 TYPE 2 DIABETES MELLITUS WITH COMPLICATION, WITHOUT LONG-TERM CURRENT USE OF INSULIN (HCC): ICD-10-CM

## 2020-07-31 DIAGNOSIS — E78.5 HYPERLIPIDEMIA, UNSPECIFIED HYPERLIPIDEMIA TYPE: ICD-10-CM

## 2020-07-31 LAB
BASOPHILS ABSOLUTE: 0.1 K/UL (ref 0–0.2)
BASOPHILS RELATIVE PERCENT: 0.7 %
EOSINOPHILS ABSOLUTE: 0.2 K/UL (ref 0–0.6)
EOSINOPHILS RELATIVE PERCENT: 2.2 %
HCT VFR BLD CALC: 40.6 % (ref 36–48)
HEMOGLOBIN: 13.8 G/DL (ref 12–16)
LYMPHOCYTES ABSOLUTE: 1.3 K/UL (ref 1–5.1)
LYMPHOCYTES RELATIVE PERCENT: 12.4 %
MCH RBC QN AUTO: 31.6 PG (ref 26–34)
MCHC RBC AUTO-ENTMCNC: 34 G/DL (ref 31–36)
MCV RBC AUTO: 92.9 FL (ref 80–100)
MONOCYTES ABSOLUTE: 0.6 K/UL (ref 0–1.3)
MONOCYTES RELATIVE PERCENT: 5.7 %
NEUTROPHILS ABSOLUTE: 8.4 K/UL (ref 1.7–7.7)
NEUTROPHILS RELATIVE PERCENT: 79 %
PDW BLD-RTO: 14.9 % (ref 12.4–15.4)
PLATELET # BLD: 265 K/UL (ref 135–450)
PMV BLD AUTO: 8 FL (ref 5–10.5)
RBC # BLD: 4.37 M/UL (ref 4–5.2)
WBC # BLD: 10.6 K/UL (ref 4–11)

## 2020-08-01 LAB
A/G RATIO: 1.6 (ref 1.1–2.2)
ALBUMIN SERPL-MCNC: 4.3 G/DL (ref 3.4–5)
ALP BLD-CCNC: 126 U/L (ref 40–129)
ALT SERPL-CCNC: 11 U/L (ref 10–40)
ANION GAP SERPL CALCULATED.3IONS-SCNC: 14 MMOL/L (ref 3–16)
AST SERPL-CCNC: 14 U/L (ref 15–37)
BILIRUB SERPL-MCNC: 0.4 MG/DL (ref 0–1)
BUN BLDV-MCNC: 31 MG/DL (ref 7–20)
CALCIUM SERPL-MCNC: 9.5 MG/DL (ref 8.3–10.6)
CHLORIDE BLD-SCNC: 101 MMOL/L (ref 99–110)
CHOLESTEROL, TOTAL: 165 MG/DL (ref 0–199)
CO2: 25 MMOL/L (ref 21–32)
CREAT SERPL-MCNC: 1.3 MG/DL (ref 0.6–1.2)
ESTIMATED AVERAGE GLUCOSE: 116.9 MG/DL
GFR AFRICAN AMERICAN: 48
GFR NON-AFRICAN AMERICAN: 40
GLOBULIN: 2.7 G/DL
GLUCOSE BLD-MCNC: 97 MG/DL (ref 70–99)
HBA1C MFR BLD: 5.7 %
HDLC SERPL-MCNC: 45 MG/DL (ref 40–60)
LDL CHOLESTEROL CALCULATED: 73 MG/DL
POTASSIUM SERPL-SCNC: 4.9 MMOL/L (ref 3.5–5.1)
SODIUM BLD-SCNC: 140 MMOL/L (ref 136–145)
TOTAL CK: 37 U/L (ref 26–192)
TOTAL PROTEIN: 7 G/DL (ref 6.4–8.2)
TRIGL SERPL-MCNC: 235 MG/DL (ref 0–150)
VLDLC SERPL CALC-MCNC: 47 MG/DL

## 2020-09-03 ENCOUNTER — TELEPHONE (OUTPATIENT)
Dept: FAMILY MEDICINE CLINIC | Age: 75
End: 2020-09-03

## 2020-09-08 ENCOUNTER — OFFICE VISIT (OUTPATIENT)
Dept: FAMILY MEDICINE CLINIC | Age: 75
End: 2020-09-08
Payer: MEDICARE

## 2020-09-08 VITALS
TEMPERATURE: 97.9 F | SYSTOLIC BLOOD PRESSURE: 120 MMHG | DIASTOLIC BLOOD PRESSURE: 72 MMHG | BODY MASS INDEX: 34.42 KG/M2 | HEIGHT: 66 IN | OXYGEN SATURATION: 95 % | HEART RATE: 58 BPM | WEIGHT: 214.2 LBS

## 2020-09-08 PROCEDURE — G0439 PPPS, SUBSEQ VISIT: HCPCS | Performed by: FAMILY MEDICINE

## 2020-09-08 ASSESSMENT — PATIENT HEALTH QUESTIONNAIRE - PHQ9
SUM OF ALL RESPONSES TO PHQ9 QUESTIONS 1 & 2: 0
1. LITTLE INTEREST OR PLEASURE IN DOING THINGS: 0
2. FEELING DOWN, DEPRESSED OR HOPELESS: 0
SUM OF ALL RESPONSES TO PHQ QUESTIONS 1-9: 0
SUM OF ALL RESPONSES TO PHQ QUESTIONS 1-9: 0

## 2020-09-08 ASSESSMENT — LIFESTYLE VARIABLES: HOW OFTEN DO YOU HAVE A DRINK CONTAINING ALCOHOL: 0

## 2020-09-08 NOTE — PATIENT INSTRUCTIONS
Personalized Preventive Plan for Shital Jules - 9/8/2020  Medicare offers a range of preventive health benefits. Some of the tests and screenings are paid in full while other may be subject to a deductible, co-insurance, and/or copay. Some of these benefits include a comprehensive review of your medical history including lifestyle, illnesses that may run in your family, and various assessments and screenings as appropriate. After reviewing your medical record and screening and assessments performed today your provider may have ordered immunizations, labs, imaging, and/or referrals for you. A list of these orders (if applicable) as well as your Preventive Care list are included within your After Visit Summary for your review. Other Preventive Recommendations:    · A preventive eye exam performed by an eye specialist is recommended every 1-2 years to screen for glaucoma; cataracts, macular degeneration, and other eye disorders. · A preventive dental visit is recommended every 6 months. · Try to get at least 150 minutes of exercise per week or 10,000 steps per day on a pedometer . · Order or download the FREE \"Exercise & Physical Activity: Your Everyday Guide\" from The [employer]. Call 7-791.945.8731 or search The [employer] online. · You need 9196-8198 mg of calcium and 8414-3511 IU of vitamin D per day. It is possible to meet your calcium requirement with diet alone, but a vitamin D supplement is usually necessary to meet this goal.  · When exposed to the sun, use a sunscreen that protects against both UVA and UVB radiation with an SPF of 30 or greater. Reapply every 2 to 3 hours or after sweating, drying off with a towel, or swimming. · Always wear a seat belt when traveling in a car. Always wear a helmet when riding a bicycle or motorcycle.

## 2020-09-08 NOTE — PROGRESS NOTES
Medicare Annual Wellness Visit  Name: Sandra Lucas Date: 2020   MRN: [id number] Sex: Female   Age: 67 y.o. Ethnicity: Non-/Non    : 1945 Race: Shane Ray is here for Medicare AWV    Screenings for behavioral, psychosocial and functional/safety risks, and cognitive dysfunction are all negative except as indicated below. These results, as well as other patient data from the [address] form, are documented in Flowsheets linked to this Encounter. No Known Allergies    Prior to Visit Medications    Medication Sig Taking? Authorizing Provider   glipiZIDE (GLUCOTROL XL) 5 MG extended release tablet TAKE ONE TABLET BY MOUTH DAILY  Pancho Moses MD   bisoprolol-hydrochlorothiazide (ZIAC) 10-6.25 MG per tablet Take 1 tablet by mouth daily  Pancho Moses MD   lisinopril (PRINIVIL) 10 MG tablet Take 1 tablet by mouth daily  Logan Moses MD   pravastatin (PRAVACHOL) 40 MG tablet Take 1 tablet by mouth daily  Pancho Moses MD   sertraline (ZOLOFT) 100 MG tablet 1 1/2 po qd  Pancho Moses MD   SITagliptin (JANUVIA) 100 MG tablet Take 1 tablet by mouth daily  Pancho Moses MD   amoxicillin (AMOXIL) 500 MG capsule 2 GRAMS ORALLY 1 HOUR PRIOR TO PROCEDURE  Jose Cameron MD   amoxicillin (AMOXIL) 500 MG capsule 2 GRAMS ORALLY 1 HOUR PRIOR TO PROCEDURE  Jose Cameron MD   amoxicillin (AMOXIL) 500 MG capsule Take 4 caps orally 1 hour prior to dentist procedure  Jose Cameron MD   aspirin 325 MG EC tablet Take 1 tablet by mouth 2 times daily Take  mg.  Twice daily x 14 days; then resume your normal home dose of ASA  EMMA Jimenez - CNP   acetaminophen (TYLENOL) 500 MG tablet Take 1,000 mg by mouth 2 times daily  Historical Provider, MD   indomethacin (INDOCIN) 50 MG capsule Take 1 capsule by mouth 2 times daily (with meals)  Patient taking differently: Take 50 mg by mouth as needed   Robinson Grigsby MD   bevacizumab (AVASTIN) 100 MG/4ML chemo patient, evaluation of cognition reveals recent and remote memory intact. General Appearance: alert and oriented to person, place and time, well-developed and well-nourished, in no acute distress  Eyes: pupils equal, round, and reactive to light, extraocular eye movements intact, conjunctivae normal  Neck: neck supple and non tender without mass, no thyromegaly or thyroid nodules, no cervical lymphadenopathy   Pulmonary/Chest: clear to auscultation bilaterally- no wheezes, rales or rhonchi, normal air movement, no respiratory distress  Cardiovascular: normal rate, intact distal pulses and no carotid bruits  Abdomen: soft, non-tender, non-distended, normal bowel sounds, no masses or organomegaly  Extremities: no cyanosis and no clubbing  Neurologic: gait and coordination normal and speech normal    Patient's complete Health Risk Assessment and screening values have been reviewed and are found in Flowsheets. The following problems were reviewed today and where indicated follow up appointments were made and/or referrals ordered. Positive Risk Factor Screenings with Interventions:     General Health:  General  In general, how would you say your health is?: Good  In the past 7 days, have you experienced any of the following? New or Increased Pain, New or Increased Fatigue, Loneliness, Social Isolation, Stress or Anger?: (!) Social Isolation, Stress  Do you get the social and emotional support that you need?: Yes  Do you have a Living Will?: Yes  General Health Risk Interventions:  · Social isolation: patient's comments regarding inadequate social support: her and her  have isolated due to [place] [address] . pt reports she misses socializing with friends and dining out.     Health Habits/Nutrition:  Health Habits/Nutrition  Do you exercise for at least 20 minutes 2-3 times per week?: Yes  Have you lost any weight without trying in the past 3 months?: No  Do you eat fewer than 2 meals per day?: No  Have you seen a dentist within the past year?: Yes  Body mass index is 34.57 kg/m². Health Habits/Nutrition Interventions:  · Inadequate physical activity:  patient agrees to increase physical activity as follows: increase walking outdoors    Personalized Preventive Plan   Current Health Maintenance Status  Immunization History   Administered Date(s) Administered    Influenza Vaccine, unspecified formulation 09/17/2016    Influenza Virus Vaccine 11/03/2006, 10/24/2011, 11/25/2013    Influenza, High Dose (Fluzone 65 yrs and older) 11/03/2015, 10/31/2017, 10/17/2018, 09/04/2019    Pneumococcal Conjugate 13-valent ([id number]) 11/03/2015    Pneumococcal Polysaccharide ([id number]) 11/08/2005, 10/31/2017    Td, unspecified formulation 11/08/2005    Tdap (Boostrix, Adacel) 02/15/2016    Zoster Recombinant (Shingrix) 03/27/2018, 06/02/2018        Health Maintenance   Topic Date Due    Annual Wellness Visit (AWV)  05/29/2019    Flu vaccine (1) 09/01/2020    Lipid screen  07/31/2021    Potassium monitoring  07/31/2021    Creatinine monitoring  07/31/2021    DTaP/Tdap/Td vaccine (2 - Td) 02/15/2026    DEXA (modify frequency per FRAX score)  Completed    Shingles Vaccine  Completed    Pneumococcal 65+ years Vaccine  Completed    Hepatitis A vaccine  Aged Out    Hib vaccine  Aged Out    Meningococcal (ACWY) vaccine  Aged Out     Recommendations for [email or web address] Due: see orders and patient instructions/AVS.  . Recommended screening schedule for the next 5-10 years is provided to the patient in written form: see Patient Instructions/AVS.    Jillian Man was seen today for medicare awv.     Diagnoses and all orders for this visit:    Routine general medical examination at a health care facility          Flu vaccine advised

## 2020-09-15 LAB
A/G RATIO: 1.4 (ref 1.1–2.2)
ALBUMIN SERPL-MCNC: 3.9 G/DL (ref 3.4–5)
ALP BLD-CCNC: 97 U/L (ref 40–129)
ALT SERPL-CCNC: 6 U/L (ref 10–40)
ANION GAP SERPL CALCULATED.3IONS-SCNC: 11 MMOL/L (ref 3–16)
AST SERPL-CCNC: 13 U/L (ref 15–37)
BASOPHILS ABSOLUTE: 0.1 K/UL (ref 0–0.2)
BASOPHILS RELATIVE PERCENT: 0.6 %
BILIRUB SERPL-MCNC: 0.5 MG/DL (ref 0–1)
BUN BLDV-MCNC: 28 MG/DL (ref 7–20)
CALCIUM SERPL-MCNC: 9.2 MG/DL (ref 8.3–10.6)
CHLORIDE BLD-SCNC: 103 MMOL/L (ref 99–110)
CHOLESTEROL, TOTAL: 146 MG/DL (ref 0–199)
CO2: 24 MMOL/L (ref 21–32)
CREAT SERPL-MCNC: 1.2 MG/DL (ref 0.6–1.2)
EOSINOPHILS ABSOLUTE: 0.2 K/UL (ref 0–0.6)
EOSINOPHILS RELATIVE PERCENT: 2.3 %
GFR AFRICAN AMERICAN: 53
GFR NON-AFRICAN AMERICAN: 44
GLOBULIN: 2.7 G/DL
GLUCOSE BLD-MCNC: 121 MG/DL (ref 70–99)
HCT VFR BLD CALC: 37.2 % (ref 36–48)
HDLC SERPL-MCNC: 39 MG/DL (ref 40–60)
HEMOGLOBIN: 13.3 G/DL (ref 12–16)
LDL CHOLESTEROL CALCULATED: 71 MG/DL
LYMPHOCYTES ABSOLUTE: 1.1 K/UL (ref 1–5.1)
LYMPHOCYTES RELATIVE PERCENT: 11.2 %
MCH RBC QN AUTO: 33.3 PG (ref 26–34)
MCHC RBC AUTO-ENTMCNC: 35.7 G/DL (ref 31–36)
MCV RBC AUTO: 93.3 FL (ref 80–100)
MONOCYTES ABSOLUTE: 0.5 K/UL (ref 0–1.3)
MONOCYTES RELATIVE PERCENT: 4.9 %
NEUTROPHILS ABSOLUTE: 7.7 K/UL (ref 1.7–7.7)
NEUTROPHILS RELATIVE PERCENT: 81 %
PDW BLD-RTO: 14.9 % (ref 12.4–15.4)
PLATELET # BLD: 233 K/UL (ref 135–450)
PMV BLD AUTO: 7.8 FL (ref 5–10.5)
POTASSIUM SERPL-SCNC: 4.9 MMOL/L (ref 3.5–5.1)
RBC # BLD: 3.98 M/UL (ref 4–5.2)
SODIUM BLD-SCNC: 138 MMOL/L (ref 136–145)
TOTAL CK: 44 U/L (ref 26–192)
TOTAL PROTEIN: 6.6 G/DL (ref 6.4–8.2)
TRIGL SERPL-MCNC: 178 MG/DL (ref 0–150)
VLDLC SERPL CALC-MCNC: 36 MG/DL
WBC # BLD: 9.5 K/UL (ref 4–11)

## 2020-09-16 LAB
ESTIMATED AVERAGE GLUCOSE: 119.8 MG/DL
HBA1C MFR BLD: 5.8 %

## 2020-10-06 ENCOUNTER — OFFICE VISIT (OUTPATIENT)
Dept: FAMILY MEDICINE CLINIC | Age: 75
End: 2020-10-06
Payer: MEDICARE

## 2020-10-06 VITALS
WEIGHT: 211 LBS | DIASTOLIC BLOOD PRESSURE: 78 MMHG | HEART RATE: 57 BPM | BODY MASS INDEX: 34.06 KG/M2 | OXYGEN SATURATION: 98 % | SYSTOLIC BLOOD PRESSURE: 116 MMHG | TEMPERATURE: 97 F

## 2020-10-06 LAB
BILIRUBIN, POC: NORMAL
BLOOD URINE, POC: NORMAL
CHP ED QC CHECK: NORMAL
CLARITY, POC: CLEAR
COLOR, POC: YELLOW
GLUCOSE BLD-MCNC: 104 MG/DL
GLUCOSE URINE, POC: NORMAL
KETONES, POC: NORMAL
LEUKOCYTE EST, POC: NORMAL
NITRITE, POC: NORMAL
PH, POC: 5.5
PROTEIN, POC: NORMAL
SPECIFIC GRAVITY, POC: 1.02
UROBILINOGEN, POC: 1

## 2020-10-06 PROCEDURE — 82962 GLUCOSE BLOOD TEST: CPT | Performed by: FAMILY MEDICINE

## 2020-10-06 PROCEDURE — G0008 ADMIN INFLUENZA VIRUS VAC: HCPCS | Performed by: FAMILY MEDICINE

## 2020-10-06 PROCEDURE — 99214 OFFICE O/P EST MOD 30 MIN: CPT | Performed by: FAMILY MEDICINE

## 2020-10-06 PROCEDURE — 90694 VACC AIIV4 NO PRSRV 0.5ML IM: CPT | Performed by: FAMILY MEDICINE

## 2020-10-06 PROCEDURE — 81002 URINALYSIS NONAUTO W/O SCOPE: CPT | Performed by: FAMILY MEDICINE

## 2020-10-06 RX ORDER — LISINOPRIL 10 MG/1
10 TABLET ORAL DAILY
Qty: 90 TABLET | Refills: 0 | Status: SHIPPED | OUTPATIENT
Start: 2020-10-06 | End: 2021-01-19 | Stop reason: SDUPTHER

## 2020-10-06 RX ORDER — BISOPROLOL FUMARATE AND HYDROCHLOROTHIAZIDE 10; 6.25 MG/1; MG/1
1 TABLET ORAL DAILY
Qty: 90 TABLET | Refills: 0 | Status: SHIPPED | OUTPATIENT
Start: 2020-10-06 | End: 2021-01-19 | Stop reason: SDUPTHER

## 2020-10-06 RX ORDER — SERTRALINE HYDROCHLORIDE 100 MG/1
TABLET, FILM COATED ORAL
Qty: 135 TABLET | Refills: 0 | Status: SHIPPED | OUTPATIENT
Start: 2020-10-06 | End: 2021-01-19 | Stop reason: SDUPTHER

## 2020-10-06 RX ORDER — PRAVASTATIN SODIUM 40 MG
40 TABLET ORAL DAILY
Qty: 90 TABLET | Refills: 0 | Status: SHIPPED | OUTPATIENT
Start: 2020-10-06 | End: 2021-01-19 | Stop reason: SDUPTHER

## 2020-10-06 RX ORDER — GLIPIZIDE 5 MG/1
TABLET, FILM COATED, EXTENDED RELEASE ORAL
Qty: 90 TABLET | Refills: 0 | Status: CANCELLED | OUTPATIENT
Start: 2020-10-06

## 2020-10-06 ASSESSMENT — ENCOUNTER SYMPTOMS
ABDOMINAL PAIN: 0
COUGH: 0
BLOOD IN STOOL: 0
CHEST TIGHTNESS: 0
SHORTNESS OF BREATH: 0

## 2020-10-06 NOTE — PROGRESS NOTES
SUBJECTIVE:  Ruthann Benavides   1945   female   No Known Allergies    Chief Complaint   Patient presents with    3 Month Follow-Up    Hypertension    Diabetes        Patient Active Problem List    Diagnosis Date Noted    10/21/19 RIGHT TKA 10/21/2019    Encounter for postoperative care 10/21/2019    Prerenal azotemia 10/21/2019    Primary osteoarthritis of left knee 01/02/2019    Idiopathic chronic gout of left foot without tophus 10/31/2017    Type 2 diabetes mellitus with complication, without long-term current use of insulin ([facility] [address] 08/07/2016    Depression 03/04/2015    Ventral hernia 06/20/2014    Morbid obesity ([facility] [address] 06/20/2014    Macular degeneration 06/20/2014    Hypertension     Osteopenia     Hyperlipidemia        HPI   Pt is here today for fu on diabetes, CRD, depression, hyperlipidemia, HTN and hx of gout. She has been doing well on current tx mgt. denies CP,SOB or myalgias. No ha,change in vision or neurologic sx. Denies sx of depression or anxiety. Sleeping well. No GI sx. Has not had any trouble with gout for a while. She is currently followed by nephrology. Last HgA1c 5.8 and LDL 71. She is 1 yr s/p knee replacement and has done well.    Past Medical History:   Diagnosis Date    CRD (chronic renal disease)     Depression     Herpes dermatitis     left thigh    Hyperlipidemia     Hypertension     Macular degeneration     Obesity     Osteopenia     Squamous acanthoma of face     Type II or unspecified type diabetes mellitus without mention of complication, not stated as uncontrolled      Social History     Socioeconomic History    Marital status:      Spouse name: Not on file    Number of children: Not on file    Years of education: Not on file    Highest education level: Not on file   Occupational History    Not on file   Social Needs    Financial resource strain: Not on file    Food insecurity     Worry: Not on file     Inability: Not on file   [place] Transportation needs     Medical: Not on file     Non-medical: Not on file   Tobacco Use    Smoking status: Never Smoker    Smokeless tobacco: Never Used   Substance and Sexual Activity    Alcohol use: No     Alcohol/week: 0.0 standard drinks    Drug use: No    Sexual activity: Not on file   Lifestyle    Physical activity     Days per week: Not on file     Minutes per session: Not on file    Stress: Not on file   Relationships    Social connections     Talks on phone: Not on file     Gets together: Not on file     Attends [employer] service: Not on file     Active member of club or organization: Not on file     Attends meetings of clubs or organizations: Not on file     Relationship status: Not on file    Intimate partner violence     Fear of current or ex partner: Not on file     Emotionally abused: Not on file     Physically abused: Not on file     Forced sexual activity: Not on file   Other Topics Concern    Not on file   Social History Narrative    Not on file     Family History   Problem Relation Age of Onset    Heart Disease Mother     Alcohol Abuse Mother     Migraines Mother     Heart Disease Father     Stroke Father     Hypertension Father     Alcohol Abuse Other     Migraines Other        Review of Systems   Constitutional: Negative for activity change, appetite change and unexpected weight change. Respiratory: Negative for cough, chest tightness and shortness of breath. Cardiovascular: Negative for chest pain, palpitations and leg swelling. Gastrointestinal: Negative for abdominal pain and blood in stool. Endocrine: Negative for cold intolerance and heat intolerance. Musculoskeletal: Negative for arthralgias and myalgias. Skin: Negative for rash. Neurological: Negative for light-headedness and headaches. Hematological: Negative for adenopathy. Does not bruise/bleed easily. Psychiatric/Behavioral: Negative for dysphoric mood, sleep disturbance and suicidal ideas.  The patient is not nervous/anxious. OBJECTIVE:  /78   Pulse 57   Temp 97 °F (36.1 °C)   Wt 211 lb (95.7 kg)   SpO2 98%   BMI 34.06 kg/m²   Physical Exam  Vitals signs and nursing note reviewed. Constitutional:       Appearance: She is well-developed. Eyes:      Pupils: Pupils are equal, round, and reactive to light. Neck:      Musculoskeletal: Normal range of motion and neck supple. Thyroid: No thyromegaly. Vascular: No JVD. Cardiovascular:      Rate and Rhythm: Normal rate and regular rhythm. Pulmonary:      Effort: Pulmonary effort is normal.      Breath sounds: Normal breath sounds. Abdominal:      General: Bowel sounds are normal.      Palpations: Abdomen is soft. Tenderness: There is no abdominal tenderness. Skin:     Findings: No rash. Neurological:      Mental Status: She is alert and oriented to person, place, and time. Psychiatric:         Behavior: Behavior normal.         Thought Content: Thought content normal.         ASSESSMENT/PLAN:    1. Type 2 diabetes mellitus with complication, without long-term current use of insulin (HCC)  Refill meds  Reviewed labs  Discussed appr diet mgt  - POCT Urinalysis no Micro  - POCT Glucose    2. Chronic kidney disease, unspecified CKD stage  Reviewed labs  Continue fu with nephrology as advised    3. Depression, unspecified depression type  Stress mgt  Refill Zoloft    4. Hyperlipidemia, unspecified hyperlipidemia type  Continue Pravachol  Reviewed labs  Encouraged appr diet mgt    5. Essential hypertension  Refill meds  DASH diet reviewed  Ambulatory BP checks    6. Idiopathic gout involving toe of left foot, unspecified chronicity  Gout precautions discussed  Continue prn mgt    7.  Need for vaccination    - INFLUENZA, QUADV, ADJUVANTED, 65 YRS =, IM, PF, PREFILL SYR, 0.5ML (FLUAD)  - [place] IMMUNIZ ADMIN,1 SINGLE/COMB VAC/TOXOID      Orders Placed This Encounter   Procedures    INFLUENZA, QUADV, ADJUVANTED, 65 YRS =, IM, PF, PREFILL SYR, 0.5ML (FLUAD)    POCT Urinalysis no Micro    POCT Glucose    [place] IMMUNIZ ADMIN,1 SINGLE/COMB VAC/TOXOID     Current Outpatient Medications   Medication Sig Dispense Refill    bisoprolol-hydroCHLOROthiazide (ZIAC) 10-6.25 MG per tablet Take 1 tablet by mouth daily 90 tablet 0    lisinopril (PRINIVIL) 10 MG tablet Take 1 tablet by mouth daily 90 tablet 0    pravastatin (PRAVACHOL) 40 MG tablet Take 1 tablet by mouth daily 90 tablet 0    sertraline (ZOLOFT) 100 MG tablet 1 1/2 po qd 135 tablet 0    SITagliptin (JANUVIA) 100 MG tablet Take 1 tablet by mouth daily 90 tablet 0    glipiZIDE (GLUCOTROL XL) 5 MG extended release tablet TAKE ONE TABLET BY MOUTH DAILY 90 tablet 0    amoxicillin (AMOXIL) 500 MG capsule 2 GRAMS ORALLY 1 HOUR PRIOR TO PROCEDURE 4 capsule 3    amoxicillin (AMOXIL) 500 MG capsule 2 GRAMS ORALLY 1 HOUR PRIOR TO PROCEDURE 4 capsule 3    amoxicillin (AMOXIL) 500 MG capsule Take 4 caps orally 1 hour prior to dentist procedure 4 capsule 0    aspirin 325 MG EC tablet Take 1 tablet by mouth 2 times daily Take  mg. Twice daily x 14 days; then resume your normal home dose of ASA 28 tablet 3    acetaminophen (TYLENOL) 500 MG tablet Take 1,000 mg by mouth 2 times daily      indomethacin (INDOCIN) 50 MG capsule Take 1 capsule by mouth 2 times daily (with meals) (Patient taking differently: Take 50 mg by mouth as needed ) 20 capsule 0    bevacizumab (AVASTIN) 100 MG/4ML chemo injection inject 0.05mL intraocularly in affected eye.  Handicap Placard MISC by Does not apply route Osteoarthritis, knee pain  1/2019-1/24 1 each 0    Misc. Devices (WALKER) MISC 1 each by Does not apply route daily. Dispense and Fit for injury to left lower extremity and weakness. 1 each 0     No current facility-administered medications for this visit. Return in about 3 months (around 1/6/2021), or if symptoms worsen or fail to improve, for diabetes, HTN, hyperlipidemia.     Orval Short MD Josué

## 2020-10-11 ENCOUNTER — PATIENT MESSAGE (OUTPATIENT)
Dept: FAMILY MEDICINE CLINIC | Age: 75
End: 2020-10-11

## 2020-10-12 NOTE — TELEPHONE ENCOUNTER
Theron Gil @ [phone] and she stated that the orders were put in and that he had them done at [facility]. Advised that we will call once we have the results.

## 2021-01-19 ENCOUNTER — OFFICE VISIT (OUTPATIENT)
Dept: FAMILY MEDICINE CLINIC | Age: 76
End: 2021-01-19
Payer: MEDICARE

## 2021-01-19 VITALS
TEMPERATURE: 96.8 F | SYSTOLIC BLOOD PRESSURE: 128 MMHG | BODY MASS INDEX: 32.05 KG/M2 | OXYGEN SATURATION: 93 % | RESPIRATION RATE: 16 BRPM | WEIGHT: 198.6 LBS | DIASTOLIC BLOOD PRESSURE: 76 MMHG | HEART RATE: 54 BPM

## 2021-01-19 DIAGNOSIS — E78.5 HYPERLIPIDEMIA, UNSPECIFIED HYPERLIPIDEMIA TYPE: ICD-10-CM

## 2021-01-19 DIAGNOSIS — I10 ESSENTIAL HYPERTENSION: ICD-10-CM

## 2021-01-19 DIAGNOSIS — M10.072 IDIOPATHIC GOUT INVOLVING TOE OF LEFT FOOT, UNSPECIFIED CHRONICITY: ICD-10-CM

## 2021-01-19 DIAGNOSIS — M85.80 OSTEOPENIA, UNSPECIFIED LOCATION: ICD-10-CM

## 2021-01-19 DIAGNOSIS — D72.829 LEUKOCYTOSIS, UNSPECIFIED TYPE: ICD-10-CM

## 2021-01-19 DIAGNOSIS — N18.9 CHRONIC KIDNEY DISEASE, UNSPECIFIED CKD STAGE: ICD-10-CM

## 2021-01-19 DIAGNOSIS — F32.A DEPRESSION, UNSPECIFIED DEPRESSION TYPE: ICD-10-CM

## 2021-01-19 DIAGNOSIS — E11.8 TYPE 2 DIABETES MELLITUS WITH COMPLICATION, WITHOUT LONG-TERM CURRENT USE OF INSULIN (HCC): Primary | ICD-10-CM

## 2021-01-19 LAB
CHP ED QC CHECK: NORMAL
GLUCOSE BLD-MCNC: 140 MG/DL

## 2021-01-19 PROCEDURE — 82962 GLUCOSE BLOOD TEST: CPT | Performed by: FAMILY MEDICINE

## 2021-01-19 PROCEDURE — 99214 OFFICE O/P EST MOD 30 MIN: CPT | Performed by: FAMILY MEDICINE

## 2021-01-19 RX ORDER — BISOPROLOL FUMARATE AND HYDROCHLOROTHIAZIDE 10; 6.25 MG/1; MG/1
1 TABLET ORAL DAILY
Qty: 90 TABLET | Refills: 0 | Status: SHIPPED | OUTPATIENT
Start: 2021-01-19 | End: 2021-04-19 | Stop reason: SDUPTHER

## 2021-01-19 RX ORDER — LISINOPRIL 10 MG/1
10 TABLET ORAL DAILY
Qty: 90 TABLET | Refills: 0 | Status: SHIPPED | OUTPATIENT
Start: 2021-01-19 | End: 2021-04-19 | Stop reason: SDUPTHER

## 2021-01-19 RX ORDER — PRAVASTATIN SODIUM 40 MG
40 TABLET ORAL DAILY
Qty: 90 TABLET | Refills: 0 | Status: SHIPPED | OUTPATIENT
Start: 2021-01-19 | End: 2021-04-19 | Stop reason: SDUPTHER

## 2021-01-19 RX ORDER — SERTRALINE HYDROCHLORIDE 100 MG/1
TABLET, FILM COATED ORAL
Qty: 135 TABLET | Refills: 0 | Status: SHIPPED | OUTPATIENT
Start: 2021-01-19 | End: 2021-04-19 | Stop reason: SDUPTHER

## 2021-01-19 ASSESSMENT — ENCOUNTER SYMPTOMS
DIARRHEA: 0
BLOOD IN STOOL: 0
SHORTNESS OF BREATH: 0
CONSTIPATION: 0
COUGH: 0
ABDOMINAL PAIN: 0
CHEST TIGHTNESS: 0

## 2021-01-20 NOTE — PROGRESS NOTES
SUBJECTIVE:  Dyan Mayberry   1945   female   No Known Allergies    Chief Complaint   Patient presents with    Hyperlipidemia    Diabetes    Depression        Patient Active Problem List    Diagnosis Date Noted    10/21/19 RIGHT TKA 10/21/2019    Encounter for postoperative care 10/21/2019    Prerenal azotemia 10/21/2019    Primary osteoarthritis of left knee 01/02/2019    Idiopathic chronic gout of left foot without tophus 10/31/2017    Type 2 diabetes mellitus with complication, without long-term current use of insulin ([facility] [address] 08/07/2016    Depression 03/04/2015    Ventral hernia 06/20/2014    Morbid obesity ([facility] [address] 06/20/2014    Macular degeneration 06/20/2014    Hypertension     Osteopenia     Hyperlipidemia        HPI   Pt is here today for fu on diabetes, hyperlipidemia, depression, CRD, HTN, hx of gout, osteopenia and hx of leukocytosis. She has been stable on current tx. Denies CP,SOB or myalgias. No ha,change in vision or neurologic sx. Last HgA1c 5.8. and LDL 71. Denies any episodes of hypoglycemia. Her  recently passed away from complications of brain tumor. She does have family support and doing ok. Continues on Zoloft. Has not had any trouble with gout. No other concerns today.   Past Medical History:   Diagnosis Date    CRD (chronic renal disease)     Depression     Herpes dermatitis     left thigh    Hyperlipidemia     Hypertension     Macular degeneration     Obesity     Osteopenia     Squamous acanthoma of face     Type II or unspecified type diabetes mellitus without mention of complication, not stated as uncontrolled      Social History     Socioeconomic History    Marital status:      Spouse name: Not on file    Number of children: Not on file    Years of education: Not on file    Highest education level: Not on file   Occupational History    Not on file   Social Needs    Financial resource strain: Not on file    Food insecurity Worry: Not on file     Inability: Not on file    Transportation needs     Medical: Not on file     Non-medical: Not on file   Tobacco Use    Smoking status: Never Smoker    Smokeless tobacco: Never Used   Substance and Sexual Activity    Alcohol use: No     Alcohol/week: 0.0 standard drinks    Drug use: No    Sexual activity: Not on file   Lifestyle    Physical activity     Days per week: Not on file     Minutes per session: Not on file    Stress: Not on file   Relationships    Social connections     Talks on phone: Not on file     Gets together: Not on file     Attends Denominational service: Not on file     Active member of club or organization: Not on file     Attends meetings of clubs or organizations: Not on file     Relationship status: Not on file    Intimate partner violence     Fear of current or ex partner: Not on file     Emotionally abused: Not on file     Physically abused: Not on file     Forced sexual activity: Not on file   Other Topics Concern    Not on file   Social History Narrative    Not on file     Family History   Problem Relation Age of Onset    Heart Disease Mother     Alcohol Abuse Mother     Migraines Mother     Heart Disease Father     Stroke Father     Hypertension Father     Alcohol Abuse Other     Migraines Other        Review of Systems   Constitutional: Negative for activity change, appetite change and unexpected weight change. Respiratory: Negative for cough, chest tightness and shortness of breath. Cardiovascular: Negative for chest pain and palpitations. Gastrointestinal: Negative for abdominal pain, blood in stool, constipation and diarrhea. Musculoskeletal: Negative for arthralgias and myalgias. Skin: Negative for rash. Neurological: Negative for light-headedness and headaches. Hematological: Negative for adenopathy. Does not bruise/bleed easily. 8. Leukocytosis, unspecified type  Last CBC is nl  Will continue to follow      Orders Placed This Encounter   Procedures    HEPATITIS C ANTIBODY     Standing Status:   Future     Standing Expiration Date:   1/19/2022    POCT Glucose     Current Outpatient Medications   Medication Sig Dispense Refill    SITagliptin (JANUVIA) 100 MG tablet Take 1 tablet by mouth daily 90 tablet 0    sertraline (ZOLOFT) 100 MG tablet 1 1/2 po qd 135 tablet 0    pravastatin (PRAVACHOL) 40 MG tablet Take 1 tablet by mouth daily 90 tablet 0    lisinopril (PRINIVIL) 10 MG tablet Take 1 tablet by mouth daily 90 tablet 0    bisoprolol-hydroCHLOROthiazide (ZIAC) 10-6.25 MG per tablet Take 1 tablet by mouth daily 90 tablet 0    glipiZIDE (GLUCOTROL XL) 5 MG extended release tablet TAKE ONE TABLET BY MOUTH DAILY 90 tablet 0    amoxicillin (AMOXIL) 500 MG capsule 2 GRAMS ORALLY 1 HOUR PRIOR TO PROCEDURE 4 capsule 3    amoxicillin (AMOXIL) 500 MG capsule 2 GRAMS ORALLY 1 HOUR PRIOR TO PROCEDURE 4 capsule 3    amoxicillin (AMOXIL) 500 MG capsule Take 4 caps orally 1 hour prior to dentist procedure 4 capsule 0    aspirin 325 MG EC tablet Take 1 tablet by mouth 2 times daily Take  mg. Twice daily x 14 days; then resume your normal home dose of ASA 28 tablet 3    acetaminophen (TYLENOL) 500 MG tablet Take 1,000 mg by mouth 2 times daily      indomethacin (INDOCIN) 50 MG capsule Take 1 capsule by mouth 2 times daily (with meals) (Patient taking differently: Take 50 mg by mouth as needed ) 20 capsule 0    bevacizumab (AVASTIN) 100 MG/4ML chemo injection inject 0.05mL intraocularly in affected eye.  Handicap Placard MISC by Does not apply route Osteoarthritis, knee pain  1/2019-1/24 1 each 0    Misc. Devices (WALKER) MISC 1 each by Does not apply route daily. Dispense and Fit for injury to left lower extremity and weakness. 1 each 0     No current facility-administered medications for this visit.

## 2021-01-29 DIAGNOSIS — E78.5 HYPERLIPIDEMIA, UNSPECIFIED HYPERLIPIDEMIA TYPE: ICD-10-CM

## 2021-01-29 DIAGNOSIS — E11.8 TYPE 2 DIABETES MELLITUS WITH COMPLICATION, WITHOUT LONG-TERM CURRENT USE OF INSULIN (HCC): ICD-10-CM

## 2021-01-29 LAB
A/G RATIO: 1.3 (ref 1.1–2.2)
ALBUMIN SERPL-MCNC: 3.9 G/DL (ref 3.4–5)
ALP BLD-CCNC: 109 U/L (ref 40–129)
ALT SERPL-CCNC: <5 U/L (ref 10–40)
ANION GAP SERPL CALCULATED.3IONS-SCNC: 9 MMOL/L (ref 3–16)
AST SERPL-CCNC: 15 U/L (ref 15–37)
BASOPHILS ABSOLUTE: 0.1 K/UL (ref 0–0.2)
BASOPHILS RELATIVE PERCENT: 0.8 %
BILIRUB SERPL-MCNC: 0.4 MG/DL (ref 0–1)
BUN BLDV-MCNC: 25 MG/DL (ref 7–20)
CALCIUM SERPL-MCNC: 9.4 MG/DL (ref 8.3–10.6)
CHLORIDE BLD-SCNC: 101 MMOL/L (ref 99–110)
CHOLESTEROL, TOTAL: 156 MG/DL (ref 0–199)
CO2: 28 MMOL/L (ref 21–32)
CREAT SERPL-MCNC: 1.2 MG/DL (ref 0.6–1.2)
EOSINOPHILS ABSOLUTE: 0.2 K/UL (ref 0–0.6)
EOSINOPHILS RELATIVE PERCENT: 2.6 %
GFR AFRICAN AMERICAN: 53
GFR NON-AFRICAN AMERICAN: 44
GLOBULIN: 3.1 G/DL
GLUCOSE BLD-MCNC: 90 MG/DL (ref 70–99)
HCT VFR BLD CALC: 36.7 % (ref 36–48)
HDLC SERPL-MCNC: 41 MG/DL (ref 40–60)
HEMOGLOBIN: 12.7 G/DL (ref 12–16)
HEPATITIS C ANTIBODY INTERPRETATION: NORMAL
LDL CHOLESTEROL CALCULATED: 85 MG/DL
LYMPHOCYTES ABSOLUTE: 1 K/UL (ref 1–5.1)
LYMPHOCYTES RELATIVE PERCENT: 10.8 %
MCH RBC QN AUTO: 31.9 PG (ref 26–34)
MCHC RBC AUTO-ENTMCNC: 34.5 G/DL (ref 31–36)
MCV RBC AUTO: 92.6 FL (ref 80–100)
MONOCYTES ABSOLUTE: 0.4 K/UL (ref 0–1.3)
MONOCYTES RELATIVE PERCENT: 4.8 %
NEUTROPHILS ABSOLUTE: 7.4 K/UL (ref 1.7–7.7)
NEUTROPHILS RELATIVE PERCENT: 81 %
PDW BLD-RTO: 13.7 % (ref 12.4–15.4)
PLATELET # BLD: 244 K/UL (ref 135–450)
PMV BLD AUTO: 7.9 FL (ref 5–10.5)
POTASSIUM SERPL-SCNC: 4.8 MMOL/L (ref 3.5–5.1)
RBC # BLD: 3.96 M/UL (ref 4–5.2)
SODIUM BLD-SCNC: 138 MMOL/L (ref 136–145)
TOTAL CK: 29 U/L (ref 26–192)
TOTAL PROTEIN: 7 G/DL (ref 6.4–8.2)
TRIGL SERPL-MCNC: 152 MG/DL (ref 0–150)
VLDLC SERPL CALC-MCNC: 30 MG/DL
WBC # BLD: 9.1 K/UL (ref 4–11)

## 2021-01-30 LAB
ESTIMATED AVERAGE GLUCOSE: 105.4 MG/DL
HBA1C MFR BLD: 5.3 %

## 2021-04-19 ENCOUNTER — OFFICE VISIT (OUTPATIENT)
Dept: FAMILY MEDICINE CLINIC | Age: 76
End: 2021-04-19
Payer: MEDICARE

## 2021-04-19 VITALS
HEART RATE: 54 BPM | HEIGHT: 66 IN | SYSTOLIC BLOOD PRESSURE: 126 MMHG | RESPIRATION RATE: 16 BRPM | BODY MASS INDEX: 31.5 KG/M2 | DIASTOLIC BLOOD PRESSURE: 80 MMHG | WEIGHT: 196 LBS | TEMPERATURE: 97 F | OXYGEN SATURATION: 97 %

## 2021-04-19 DIAGNOSIS — E11.8 TYPE 2 DIABETES MELLITUS WITH COMPLICATION, WITHOUT LONG-TERM CURRENT USE OF INSULIN (HCC): Primary | ICD-10-CM

## 2021-04-19 DIAGNOSIS — I10 ESSENTIAL HYPERTENSION: ICD-10-CM

## 2021-04-19 DIAGNOSIS — M10.072 IDIOPATHIC GOUT INVOLVING TOE OF LEFT FOOT, UNSPECIFIED CHRONICITY: ICD-10-CM

## 2021-04-19 DIAGNOSIS — E78.5 HYPERLIPIDEMIA, UNSPECIFIED HYPERLIPIDEMIA TYPE: ICD-10-CM

## 2021-04-19 DIAGNOSIS — N18.9 CHRONIC KIDNEY DISEASE, UNSPECIFIED CKD STAGE: ICD-10-CM

## 2021-04-19 DIAGNOSIS — F32.A DEPRESSION, UNSPECIFIED DEPRESSION TYPE: ICD-10-CM

## 2021-04-19 DIAGNOSIS — M85.80 OSTEOPENIA, UNSPECIFIED LOCATION: ICD-10-CM

## 2021-04-19 DIAGNOSIS — L98.9 SKIN LESION: ICD-10-CM

## 2021-04-19 LAB
BASOPHILS ABSOLUTE: 0.1 K/UL (ref 0–0.2)
BASOPHILS RELATIVE PERCENT: 0.7 %
CHP ED QC CHECK: NORMAL
EOSINOPHILS ABSOLUTE: 0.2 K/UL (ref 0–0.6)
EOSINOPHILS RELATIVE PERCENT: 1.8 %
GLUCOSE BLD-MCNC: 115 MG/DL
HCT VFR BLD CALC: 37.6 % (ref 36–48)
HEMOGLOBIN: 12.9 G/DL (ref 12–16)
LYMPHOCYTES ABSOLUTE: 1.3 K/UL (ref 1–5.1)
LYMPHOCYTES RELATIVE PERCENT: 12.6 %
MCH RBC QN AUTO: 31.9 PG (ref 26–34)
MCHC RBC AUTO-ENTMCNC: 34.3 G/DL (ref 31–36)
MCV RBC AUTO: 93.1 FL (ref 80–100)
MONOCYTES ABSOLUTE: 0.5 K/UL (ref 0–1.3)
MONOCYTES RELATIVE PERCENT: 4.6 %
NEUTROPHILS ABSOLUTE: 8.5 K/UL (ref 1.7–7.7)
NEUTROPHILS RELATIVE PERCENT: 80.3 %
PDW BLD-RTO: 14.1 % (ref 12.4–15.4)
PLATELET # BLD: 283 K/UL (ref 135–450)
PMV BLD AUTO: 8 FL (ref 5–10.5)
RBC # BLD: 4.04 M/UL (ref 4–5.2)
WBC # BLD: 10.6 K/UL (ref 4–11)

## 2021-04-19 PROCEDURE — 82962 GLUCOSE BLOOD TEST: CPT | Performed by: FAMILY MEDICINE

## 2021-04-19 PROCEDURE — 99214 OFFICE O/P EST MOD 30 MIN: CPT | Performed by: FAMILY MEDICINE

## 2021-04-19 RX ORDER — SERTRALINE HYDROCHLORIDE 100 MG/1
TABLET, FILM COATED ORAL
Qty: 135 TABLET | Refills: 0 | Status: SHIPPED | OUTPATIENT
Start: 2021-04-19 | End: 2021-07-14 | Stop reason: SDUPTHER

## 2021-04-19 RX ORDER — LISINOPRIL 10 MG/1
10 TABLET ORAL DAILY
Qty: 90 TABLET | Refills: 0 | Status: SHIPPED | OUTPATIENT
Start: 2021-04-19 | End: 2021-07-14 | Stop reason: SDUPTHER

## 2021-04-19 RX ORDER — BISOPROLOL FUMARATE AND HYDROCHLOROTHIAZIDE 10; 6.25 MG/1; MG/1
1 TABLET ORAL DAILY
Qty: 90 TABLET | Refills: 0 | Status: SHIPPED | OUTPATIENT
Start: 2021-04-19 | End: 2021-07-14 | Stop reason: SDUPTHER

## 2021-04-19 RX ORDER — PRAVASTATIN SODIUM 40 MG
40 TABLET ORAL DAILY
Qty: 90 TABLET | Refills: 0 | Status: SHIPPED | OUTPATIENT
Start: 2021-04-19 | End: 2021-07-14 | Stop reason: SDUPTHER

## 2021-04-19 ASSESSMENT — ENCOUNTER SYMPTOMS
CHEST TIGHTNESS: 0
CONSTIPATION: 0
COUGH: 0
BLOOD IN STOOL: 0
SHORTNESS OF BREATH: 0
ABDOMINAL PAIN: 0
DIARRHEA: 0

## 2021-04-19 NOTE — PROGRESS NOTES
SUBJECTIVE:  Michelle Maynard   1945   female   No Known Allergies    Chief Complaint   Patient presents with    Diabetes    Hypertension        Patient Active Problem List    Diagnosis Date Noted    10/21/19 RIGHT TKA 10/21/2019    Encounter for postoperative care 10/21/2019    Prerenal azotemia 10/21/2019    Primary osteoarthritis of left knee 01/02/2019    Idiopathic chronic gout of left foot without tophus 10/31/2017    Type 2 diabetes mellitus with complication, without long-term current use of insulin ([facility] [address] 08/07/2016    Depression 03/04/2015    Ventral hernia 06/20/2014    Morbid obesity ([facility] [address] 06/20/2014    Macular degeneration 06/20/2014    Hypertension     Osteopenia     Hyperlipidemia        HPI   Patient is here today for follow-up on diabetes, CRD, hyperlipidemia, hypertension, depression, history of gout, osteopenia and complaining of a red/raised area on her forehead for several months. Patient reports overall she has been doing well on current treatment management. Patient denies chest pain, shortness of breath, or myalgias. Denies headache change in vision or any neurologic symptoms. Denies symptoms of depression or anxiety. She has been sleeping well. She is followed by ophthalmology and getting retinal injections and did have a small subconjunctival hemorrhage from last treatment which is now improving. She is also noticed a couple of raised erythematous areas on the right side of her forehead that have been there for months and have not really changed and are not going away. She does not have any history of skin cancer. Last DEXA scan was in 2015 and was normal.  She has not had any trouble with gout flareup. Patient is currently followed by nephrology for chronic renal disease and has had a recent appointment with no changes or new recommendations. Patient is complaining of shoulder is feeling slightly weaker.   Reports they tend to tire out after loading and unloading the  and very little overhead type work or movement. There is no pain. No tingling or numbness in upper extremities. No injuries.   Past Medical History:   Diagnosis Date    CRD (chronic renal disease)     Depression     Herpes dermatitis     left thigh    Hyperlipidemia     Hypertension     Macular degeneration     Obesity     Osteopenia     Squamous acanthoma of face     Type II or unspecified type diabetes mellitus without mention of complication, not stated as uncontrolled      Social History     Socioeconomic History    Marital status:      Spouse name: Not on file    Number of children: Not on file    Years of education: Not on file    Highest education level: Not on file   Occupational History    Not on file   Social Needs    Financial resource strain: Not on file    Food insecurity     Worry: Not on file     Inability: Not on file    Transportation needs     Medical: Not on file     Non-medical: Not on file   Tobacco Use    Smoking status: Never Smoker    Smokeless tobacco: Never Used   Substance and Sexual Activity    Alcohol use: No     Alcohol/week: 0.0 standard drinks    Drug use: No    Sexual activity: Not on file   Lifestyle    Physical activity     Days per week: Not on file     Minutes per session: Not on file    Stress: Not on file   Relationships    Social connections     Talks on phone: Not on file     Gets together: Not on file     Attends Nondenominational service: Not on file     Active member of club or organization: Not on file     Attends meetings of clubs or organizations: Not on file     Relationship status: Not on file    Intimate partner violence     Fear of current or ex partner: Not on file     Emotionally abused: Not on file     Physically abused: Not on file     Forced sexual activity: Not on file   Other Topics Concern    Not on file   Social History Narrative    Not on file     Family History   Problem Relation Age of Onset    Heart Disease Mother     Alcohol Abuse Mother    [employer] Migraines Mother     Heart Disease Father     Stroke Father     Hypertension Father     Alcohol Abuse Other     Migraines Other        Review of Systems   Constitutional: Negative for activity change, appetite change and unexpected weight change. Respiratory: Negative for cough, chest tightness and shortness of breath. Cardiovascular: Negative for chest pain, palpitations and leg swelling. Gastrointestinal: Negative for abdominal pain, blood in stool, constipation and diarrhea. Musculoskeletal: Negative for arthralgias and myalgias. Skin: Positive for rash. Neurological: Negative for light-headedness and headaches. Hematological: Negative for adenopathy. Does not bruise/bleed easily. Psychiatric/Behavioral: Negative for dysphoric mood, sleep disturbance and suicidal ideas. The patient is not nervous/anxious. OBJECTIVE:  /80 (Site: Right Upper Arm, Position: Sitting, Cuff Size: Medium Adult)   Pulse 54   Temp 97 °F (36.1 °C) (Temporal)   Resp 16   Ht 5' 6\" (1.676 m)   Wt 196 lb (88.9 kg)   SpO2 97%   BMI 31.64 kg/m²   Physical Exam  Vitals signs and nursing note reviewed. Constitutional:       Appearance: She is well-developed. Eyes:      Pupils: Pupils are equal, round, and reactive to light. Neck:      Musculoskeletal: Normal range of motion and neck supple. Thyroid: No thyromegaly. Vascular: No JVD. Cardiovascular:      Rate and Rhythm: Normal rate and regular rhythm. Pulmonary:      Effort: Pulmonary effort is normal.      Breath sounds: Normal breath sounds. Abdominal:      General: Bowel sounds are normal.      Palpations: Abdomen is soft. Tenderness: There is no abdominal tenderness. Skin:     Findings: No rash. Comments:  There is food 2 or 3, small, 2 to 3 mm raised, scaly erythematous areas right side of forehead   Neurological:      Mental Status: She is alert and oriented to person, place, and time. Psychiatric:         Behavior: Behavior normal.         Thought Content: Thought content normal.         ASSESSMENT/PLAN:    1. Type 2 diabetes mellitus with complication, without long-term current use of insulin (HCC)  We will continue with the current management. Refill medications. Discussed appropriate diet and weight management  Labs  - POCT Glucose  - Hemoglobin A1C; Future  - Hemoglobin A1C    2. Chronic kidney disease, unspecified CKD stage  Reviewed recent follow-up with nephrology. We will continue to follow as advised    3. Hyperlipidemia, unspecified hyperlipidemia type  Refill Pravachol  Follow-up labs  Discussed low-cholesterol diet  - Comprehensive Metabolic Panel; Future  - CK; Future  - CBC Auto Differential; Future  - Lipid Panel; Future  - Lipid Panel  - CBC Auto Differential  - CK  - Comprehensive Metabolic Panel    4. Essential hypertension  Continue with current management. Refill medications  Ambulatory blood pressure checks    5. Depression, unspecified depression type  Stress management. Doing well on Zoloft. We will continue the medicine    6. Idiopathic gout involving toe of left foot, unspecified chronicity  Gout precautions. Continue allopurinol    7. Osteopenia, unspecified location  Reviewed last DEXA scan which was actually normal.  Will follow up in the next year or so  Meanwhile we will try daily calcium and vitamin D and weightbearing exercises    8. Skin lesion    - Maru Blackwell MD, Dermatology, [facility]    Patient was given 9 strengthening exercises for shoulders.   Will reevaluate if symptoms persist.  Orders Placed This Encounter   Procedures    Comprehensive Metabolic Panel     Standing Status:   Future     Number of Occurrences:   1     Standing Expiration Date:   5/9/2022    CK     Standing Status:   Future     Number of Occurrences:   1     Standing Expiration Date:   5/9/2022    CBC Auto Differential     Standing Status: Future     Number of Occurrences:   1     Standing Expiration Date:   5/9/2022    Lipid Panel     Standing Status:   Future     Number of Occurrences:   1     Standing Expiration Date:   5/9/2022     Order Specific Question:   Is Patient Fasting?/# of Hours     Answer:   10 hrs    Hemoglobin A1C     Standing Status:   Future     Number of Occurrences:   1     Standing Expiration Date:   5/9/2022   Lashay Friend MD, Dermatology, [facility]     Referral Priority:   Routine     Referral Type:   Eval and Treat     Referral Reason:   Specialty Services Required     Referred to Provider:   Clarita Rausch MD     Requested Specialty:   Dermatology     Number of Visits Requested:   1    POCT Glucose     Current Outpatient Medications   Medication Sig Dispense Refill    SITagliptin (JANUVIA) 100 MG tablet Take 1 tablet by mouth daily 90 tablet 0    sertraline (ZOLOFT) 100 MG tablet 1 1/2 po qd 135 tablet 0    pravastatin (PRAVACHOL) 40 MG tablet Take 1 tablet by mouth daily 90 tablet 0    lisinopril (PRINIVIL) 10 MG tablet Take 1 tablet by mouth daily 90 tablet 0    bisoprolol-hydroCHLOROthiazide (ZIAC) 10-6.25 MG per tablet Take 1 tablet by mouth daily 90 tablet 0    glipiZIDE (GLUCOTROL XL) 5 MG extended release tablet TAKE ONE TABLET BY MOUTH DAILY 90 tablet 0    amoxicillin (AMOXIL) 500 MG capsule 2 GRAMS ORALLY 1 HOUR PRIOR TO PROCEDURE 4 capsule 3    amoxicillin (AMOXIL) 500 MG capsule 2 GRAMS ORALLY 1 HOUR PRIOR TO PROCEDURE 4 capsule 3    amoxicillin (AMOXIL) 500 MG capsule Take 4 caps orally 1 hour prior to dentist procedure 4 capsule 0    aspirin 325 MG EC tablet Take 1 tablet by mouth 2 times daily Take  mg.  Twice daily x 14 days; then resume your normal home dose of ASA 28 tablet 3    acetaminophen (TYLENOL) 500 MG tablet Take 1,000 mg by mouth 2 times daily      indomethacin (INDOCIN) 50 MG capsule Take 1 capsule by mouth 2 times daily (with meals) (Patient taking differently: Take 50 mg by mouth as needed ) 20 capsule 0    bevacizumab (AVASTIN) 100 MG/4ML chemo injection inject 0.05mL intraocularly in affected eye.  Handicap Placard MISC by Does not apply route Osteoarthritis, knee pain  1/2019-1/24 1 each 0    Misc. Devices (WALKER) MISC 1 each by Does not apply route daily. Dispense and Fit for injury to left lower extremity and weakness. 1 each 0     No current facility-administered medications for this visit. Return in about 3 months (around 7/19/2021), or if symptoms worsen or fail to improve, for diabetes,HTN.     Hilda Laboy MD

## 2021-04-19 NOTE — PATIENT INSTRUCTIONS
have pain in your back, do not do this exercise. 1. Hold on to a table or the back of a chair with your good arm. Then bend forward a little and let your sore arm hang straight down. This exercise does not use the arm muscles. Rather, use your legs and your hips to create movement that makes your arm swing freely. 2. Use the movement from your hips and legs to guide the slightly swinging arm back and forth like a pendulum (or elephant trunk). Then guide it in circles that start small (about the size of a dinner plate). Make the circles a bit larger each day, as your pain allows. 3. Do this exercise for 5 minutes, 5 to 7 times each day. 4. As you have less pain, try bending over a little farther to do this exercise. This will increase the amount of movement at your shoulder. Follow-up care is a key part of your treatment and safety. Be sure to make and go to all appointments, and call your doctor if you are having problems. It's also a good idea to know your test results and keep a list of the medicines you take. Where can you learn more? Go to [email or web address] and sign in to your [employer] account. Enter H562 in the soup.me box to learn more about \"Shoulder Arthritis: Exercises. \"     If you do not have an account, please click on the \"Sign Up Now\" link. Current as of: November 16, 2020               Content Version: 12.8  © 8725-0188 Healthwise, Incorporated. Care instructions adapted under license by [facility] ([facility]). If you have questions about a medical condition or this instruction, always ask your healthcare professional. [place] [address] any warranty or liability for your use of this information.

## 2021-04-20 LAB
A/G RATIO: 1.4 (ref 1.1–2.2)
ALBUMIN SERPL-MCNC: 4.2 G/DL (ref 3.4–5)
ALP BLD-CCNC: 106 U/L (ref 40–129)
ALT SERPL-CCNC: 8 U/L (ref 10–40)
ANION GAP SERPL CALCULATED.3IONS-SCNC: 11 MMOL/L (ref 3–16)
AST SERPL-CCNC: 18 U/L (ref 15–37)
BILIRUB SERPL-MCNC: 0.5 MG/DL (ref 0–1)
BUN BLDV-MCNC: 29 MG/DL (ref 7–20)
CALCIUM SERPL-MCNC: 9.2 MG/DL (ref 8.3–10.6)
CHLORIDE BLD-SCNC: 104 MMOL/L (ref 99–110)
CHOLESTEROL, TOTAL: 160 MG/DL (ref 0–199)
CO2: 26 MMOL/L (ref 21–32)
CREAT SERPL-MCNC: 1.2 MG/DL (ref 0.6–1.2)
ESTIMATED AVERAGE GLUCOSE: 99.7 MG/DL
GFR AFRICAN AMERICAN: 53
GFR NON-AFRICAN AMERICAN: 44
GLOBULIN: 3 G/DL
GLUCOSE BLD-MCNC: 94 MG/DL (ref 70–99)
HBA1C MFR BLD: 5.1 %
HDLC SERPL-MCNC: 55 MG/DL (ref 40–60)
LDL CHOLESTEROL CALCULATED: 82 MG/DL
POTASSIUM SERPL-SCNC: 4.9 MMOL/L (ref 3.5–5.1)
SODIUM BLD-SCNC: 141 MMOL/L (ref 136–145)
TOTAL CK: 40 U/L (ref 26–192)
TOTAL PROTEIN: 7.2 G/DL (ref 6.4–8.2)
TRIGL SERPL-MCNC: 113 MG/DL (ref 0–150)
VLDLC SERPL CALC-MCNC: 23 MG/DL

## 2021-07-14 ENCOUNTER — OFFICE VISIT (OUTPATIENT)
Dept: FAMILY MEDICINE CLINIC | Age: 76
End: 2021-07-14
Payer: MEDICARE

## 2021-07-14 VITALS
HEIGHT: 66 IN | OXYGEN SATURATION: 98 % | BODY MASS INDEX: 30.73 KG/M2 | TEMPERATURE: 97.2 F | DIASTOLIC BLOOD PRESSURE: 72 MMHG | HEART RATE: 53 BPM | SYSTOLIC BLOOD PRESSURE: 122 MMHG | RESPIRATION RATE: 18 BRPM | WEIGHT: 191.2 LBS

## 2021-07-14 DIAGNOSIS — M10.072 IDIOPATHIC GOUT INVOLVING TOE OF LEFT FOOT, UNSPECIFIED CHRONICITY: ICD-10-CM

## 2021-07-14 DIAGNOSIS — N18.9 CHRONIC KIDNEY DISEASE, UNSPECIFIED CKD STAGE: ICD-10-CM

## 2021-07-14 DIAGNOSIS — E78.5 HYPERLIPIDEMIA, UNSPECIFIED HYPERLIPIDEMIA TYPE: ICD-10-CM

## 2021-07-14 DIAGNOSIS — M85.80 OSTEOPENIA, UNSPECIFIED LOCATION: ICD-10-CM

## 2021-07-14 DIAGNOSIS — E11.8 TYPE 2 DIABETES MELLITUS WITH COMPLICATION, WITHOUT LONG-TERM CURRENT USE OF INSULIN (HCC): Primary | ICD-10-CM

## 2021-07-14 DIAGNOSIS — F32.A DEPRESSION, UNSPECIFIED DEPRESSION TYPE: ICD-10-CM

## 2021-07-14 DIAGNOSIS — I10 ESSENTIAL HYPERTENSION: ICD-10-CM

## 2021-07-14 LAB
BASOPHILS ABSOLUTE: 0.1 K/UL (ref 0–0.2)
BASOPHILS RELATIVE PERCENT: 0.6 %
BILIRUBIN, POC: NORMAL
BLOOD URINE, POC: NORMAL
CHP ED QC CHECK: NORMAL
CLARITY, POC: CLEAR
COLOR, POC: YELLOW
EOSINOPHILS ABSOLUTE: 0.2 K/UL (ref 0–0.6)
EOSINOPHILS RELATIVE PERCENT: 2.2 %
GLUCOSE BLD-MCNC: 90 MG/DL
GLUCOSE URINE, POC: NORMAL
HCT VFR BLD CALC: 36.2 % (ref 36–48)
HEMOGLOBIN: 12.7 G/DL (ref 12–16)
KETONES, POC: NORMAL
LEUKOCYTE EST, POC: NORMAL
LYMPHOCYTES ABSOLUTE: 1.6 K/UL (ref 1–5.1)
LYMPHOCYTES RELATIVE PERCENT: 15 %
MCH RBC QN AUTO: 31.9 PG (ref 26–34)
MCHC RBC AUTO-ENTMCNC: 35 G/DL (ref 31–36)
MCV RBC AUTO: 91.2 FL (ref 80–100)
MONOCYTES ABSOLUTE: 0.6 K/UL (ref 0–1.3)
MONOCYTES RELATIVE PERCENT: 5.2 %
NEUTROPHILS ABSOLUTE: 8.5 K/UL (ref 1.7–7.7)
NEUTROPHILS RELATIVE PERCENT: 77 %
NITRITE, POC: NORMAL
PDW BLD-RTO: 13.7 % (ref 12.4–15.4)
PH, POC: 5
PLATELET # BLD: 267 K/UL (ref 135–450)
PMV BLD AUTO: 8 FL (ref 5–10.5)
PROTEIN, POC: NORMAL
RBC # BLD: 3.97 M/UL (ref 4–5.2)
SPECIFIC GRAVITY, POC: 1.01
UROBILINOGEN, POC: 0.2
WBC # BLD: 11 K/UL (ref 4–11)

## 2021-07-14 PROCEDURE — 81002 URINALYSIS NONAUTO W/O SCOPE: CPT | Performed by: FAMILY MEDICINE

## 2021-07-14 PROCEDURE — 99214 OFFICE O/P EST MOD 30 MIN: CPT | Performed by: FAMILY MEDICINE

## 2021-07-14 PROCEDURE — 82962 GLUCOSE BLOOD TEST: CPT | Performed by: FAMILY MEDICINE

## 2021-07-14 RX ORDER — SERTRALINE HYDROCHLORIDE 100 MG/1
TABLET, FILM COATED ORAL
Qty: 135 TABLET | Refills: 0 | Status: SHIPPED | OUTPATIENT
Start: 2021-07-14 | End: 2021-10-19 | Stop reason: SDUPTHER

## 2021-07-14 RX ORDER — PRAVASTATIN SODIUM 40 MG
40 TABLET ORAL DAILY
Qty: 90 TABLET | Refills: 0 | Status: SHIPPED | OUTPATIENT
Start: 2021-07-14 | End: 2021-10-19 | Stop reason: SDUPTHER

## 2021-07-14 RX ORDER — BISOPROLOL FUMARATE AND HYDROCHLOROTHIAZIDE 10; 6.25 MG/1; MG/1
1 TABLET ORAL DAILY
Qty: 90 TABLET | Refills: 0 | Status: SHIPPED | OUTPATIENT
Start: 2021-07-14 | End: 2021-10-19 | Stop reason: SDUPTHER

## 2021-07-14 RX ORDER — LISINOPRIL 10 MG/1
10 TABLET ORAL DAILY
Qty: 90 TABLET | Refills: 0 | Status: SHIPPED | OUTPATIENT
Start: 2021-07-14 | End: 2021-10-19 | Stop reason: SDUPTHER

## 2021-07-14 ASSESSMENT — ENCOUNTER SYMPTOMS
COUGH: 0
CONSTIPATION: 0
CHEST TIGHTNESS: 0
BLOOD IN STOOL: 0
SHORTNESS OF BREATH: 0
DIARRHEA: 0
ABDOMINAL PAIN: 0

## 2021-07-14 ASSESSMENT — PATIENT HEALTH QUESTIONNAIRE - PHQ9
SUM OF ALL RESPONSES TO PHQ QUESTIONS 1-9: 0
2. FEELING DOWN, DEPRESSED OR HOPELESS: 0
SUM OF ALL RESPONSES TO PHQ QUESTIONS 1-9: 0
SUM OF ALL RESPONSES TO PHQ QUESTIONS 1-9: 0
1. LITTLE INTEREST OR PLEASURE IN DOING THINGS: 0
SUM OF ALL RESPONSES TO PHQ9 QUESTIONS 1 & 2: 0

## 2021-07-14 NOTE — PROGRESS NOTES
SUBJECTIVE:  Chuck Yorkville   1945   female   No Known Allergies    Chief Complaint   Patient presents with    3 Month Follow-Up    Diabetes    Hyperlipidemia        Patient Active Problem List    Diagnosis Date Noted    10/21/19 RIGHT TKA 10/21/2019    Encounter for postoperative care 10/21/2019    Prerenal azotemia 10/21/2019    Primary osteoarthritis of left knee 01/02/2019    Idiopathic chronic gout of left foot without tophus 10/31/2017    Type 2 diabetes mellitus with complication, without long-term current use of insulin ([facility] [address] 08/07/2016    Depression 03/04/2015    Ventral hernia 06/20/2014    Morbid obesity ([facility] [address] 06/20/2014    Macular degeneration 06/20/2014    Hypertension     Osteopenia     Hyperlipidemia        HPI   Patient is here today for follow-up on diabetes, hyperlipidemia, hypertension, depression, osteopenia, history of gout, and CKD. Patient reports overall she has been feeling well has been stable on current treatment management. Patient denies symptoms of depression or anxiety. Sleeping okay. Denies chest pain, shortness of breath or myalgias. Denies headache change in vision or neurologic symptoms. She does have history of osteopenia last DEXA scan and has been trying to take daily of vitamin D. She does have history of CKD and is currently followed by nephrology. No recent changes in treatment or new recommendations. Patient reports that blood sugars are generally pretty good at home. No episodes of hypoglycemia. Last hemoglobin A1c was only 5.1. Last cholesterol labs were total cholesterol 160 with LDL of 82. No other concerns or questions today. Patient has a remote history of gout. She denies flareups in a while. Denies any changes in bowel habits or bowel complaints.   Past Medical History:   Diagnosis Date    CRD (chronic renal disease)     Depression     Herpes dermatitis     left thigh    Hyperlipidemia     Hypertension     Macular degeneration     Obesity     Osteopenia     Squamous acanthoma of face     Type II or unspecified type diabetes mellitus without mention of complication, not stated as uncontrolled      Social History     Socioeconomic History    Marital status:      Spouse name: Not on file    Number of children: Not on file    Years of education: Not on file    Highest education level: Not on file   Occupational History    Not on file   Tobacco Use    Smoking status: Never Smoker    Smokeless tobacco: Never Used   Vaping Use    Vaping Use: Never used   Substance and Sexual Activity    Alcohol use: No     Alcohol/week: 0.0 standard drinks    Drug use: No    Sexual activity: Not on file   Other Topics Concern    Not on file   Social History Narrative    Not on file     Social Determinants of Health     Financial Resource Strain:     Difficulty of Paying Living Expenses:    Food Insecurity:     Worried About Running Out of Food in the Last Year:     [address] in the Last Year:    Transportation Needs:     Lack of Transportation (Medical):      Lack of Transportation (Non-Medical):    Physical Activity:     Days of Exercise per Week:     Minutes of Exercise per Session:    Stress:     Feeling of Stress :    Social Connections:     Frequency of Communication with Friends and Family:     Frequency of Social Gatherings with Friends and Family:     Attends [employer] Services:     Active Member of Clubs or Organizations:     Attends Club or Organization Meetings:     Marital Status:    Intimate Partner Violence:     Fear of Current or Ex-Partner:     Emotionally Abused:     Physically Abused:     Sexually Abused:      Family History   Problem Relation Age of Onset    Heart Disease Mother     Alcohol Abuse Mother    [employer] Migraines Mother     Heart Disease Father     Stroke Father     Hypertension Father     Alcohol Abuse Other     Migraines Other        Review of Systems   Constitutional: Negative for activity change, appetite change and unexpected weight change. Respiratory: Negative for cough, chest tightness and shortness of breath. Cardiovascular: Negative for chest pain, palpitations and leg swelling. Gastrointestinal: Negative for abdominal pain, blood in stool, constipation and diarrhea. Musculoskeletal: Negative for arthralgias and myalgias. Skin: Negative for rash. Neurological: Negative for light-headedness and headaches. Hematological: Negative for adenopathy. Does not bruise/bleed easily. Psychiatric/Behavioral: Negative for dysphoric mood, sleep disturbance and suicidal ideas. The patient is not nervous/anxious. OBJECTIVE:  /72   Pulse 53   Temp 97.2 °F (36.2 °C)   Resp 18   Ht 5' 6\" (1.676 m)   Wt 191 lb 3.2 oz (86.7 kg)   SpO2 98%   BMI 30.86 kg/m²   Physical Exam  Constitutional:       Appearance: She is well-developed. Eyes:      Pupils: Pupils are equal, round, and reactive to light. Neck:      Thyroid: No thyromegaly. Vascular: No JVD. Cardiovascular:      Rate and Rhythm: Normal rate and regular rhythm. Pulmonary:      Effort: Pulmonary effort is normal.      Breath sounds: Normal breath sounds. Abdominal:      General: Bowel sounds are normal.      Palpations: Abdomen is soft. Tenderness: There is no abdominal tenderness. Musculoskeletal:      Cervical back: Normal range of motion and neck supple. Skin:     Findings: No rash. Neurological:      General: No focal deficit present. Mental Status: She is alert and oriented to person, place, and time. Psychiatric:         Behavior: Behavior normal.         Thought Content: Thought content normal.         ASSESSMENT/PLAN:    1.  Type 2 diabetes mellitus with complication, without long-term current use of insulin ([address]  Continue with current management  Appropriate diet and increased physical activity advised  Labs  - POCT Urinalysis no Micro  - POCT Glucose  - Comprehensive Metabolic Panel; Future  - Hemoglobin A1C; Future  - Hemoglobin A1C  - Comprehensive Metabolic Panel    2. Hyperlipidemia, unspecified hyperlipidemia type  Reviewed last cholesterol labs. Continue current management refill meds  - CK; Future  - CBC Auto Differential; Future  - Lipid Panel; Future  - Lipid Panel  - CBC Auto Differential  - CK    3. Essential hypertension  Refill medications  Ambulatory blood pressure checks  DASH diet reminder    4. Depression, unspecified depression type  Stress management  Continue Zoloft  5. Osteopenia, unspecified location  Continue with increased diet. Calcium intake and vitamin D    6. Idiopathic gout involving toe of left foot, unspecified  Gout precautions  Continue. Management  7. Chronic kidney disease, unspecified CKD stage  Reviewed follow-up with nephrology.   Continue follow-up as advised      Orders Placed This Encounter   Procedures    Comprehensive Metabolic Panel     Standing Status:   Future     Number of Occurrences:   1     Standing Expiration Date:   8/3/2022    CK     Standing Status:   Future     Number of Occurrences:   1     Standing Expiration Date:   8/3/2022    CBC Auto Differential     Standing Status:   Future     Number of Occurrences:   1     Standing Expiration Date:   8/3/2022    Hemoglobin A1C     Standing Status:   Future     Number of Occurrences:   1     Standing Expiration Date:   8/3/2022    Lipid Panel     Standing Status:   Future     Number of Occurrences:   1     Standing Expiration Date:   8/3/2022     Order Specific Question:   Is Patient Fasting?/# of Hours     Answer:   10 hrs    POCT Urinalysis no Micro    POCT Glucose     Current Outpatient Medications   Medication Sig Dispense Refill    SITagliptin (JANUVIA) 100 MG tablet Take 1 tablet by mouth daily 90 tablet 0    sertraline (ZOLOFT) 100 MG tablet 1 1/2 po qd 135 tablet 0    pravastatin (PRAVACHOL) 40 MG tablet Take 1 tablet by mouth daily 90 tablet 0    lisinopril (PRINIVIL) 10 MG tablet Take 1 tablet by mouth daily 90 tablet 0    bisoprolol-hydroCHLOROthiazide (ZIAC) 10-6.25 MG per tablet Take 1 tablet by mouth daily 90 tablet 0    glipiZIDE (GLUCOTROL XL) 5 MG extended release tablet TAKE ONE TABLET BY MOUTH DAILY 90 tablet 0    amoxicillin (AMOXIL) 500 MG capsule 2 GRAMS ORALLY 1 HOUR PRIOR TO PROCEDURE 4 capsule 3    amoxicillin (AMOXIL) 500 MG capsule 2 GRAMS ORALLY 1 HOUR PRIOR TO PROCEDURE 4 capsule 3    amoxicillin (AMOXIL) 500 MG capsule Take 4 caps orally 1 hour prior to dentist procedure 4 capsule 0    aspirin 325 MG EC tablet Take 1 tablet by mouth 2 times daily Take  mg. Twice daily x 14 days; then resume your normal home dose of ASA 28 tablet 3    acetaminophen (TYLENOL) 500 MG tablet Take 1,000 mg by mouth 2 times daily      indomethacin (INDOCIN) 50 MG capsule Take 1 capsule by mouth 2 times daily (with meals) (Patient taking differently: Take 50 mg by mouth as needed ) 20 capsule 0    bevacizumab (AVASTIN) 100 MG/4ML chemo injection inject 0.05mL intraocularly in affected eye.  Handicap Placard MISC by Does not apply route Osteoarthritis, knee pain  1/2019-1/24 1 each 0    Misc. Devices (WALKER) MISC 1 each by Does not apply route daily. Dispense and Fit for injury to left lower extremity and weakness. 1 each 0     No current facility-administered medications for this visit. Return in about 3 months (around 10/14/2021), or if symptoms worsen or fail to improve, for diabetes, hyperlipidemia.     Margaret Prater MD, MD

## 2021-07-15 LAB
A/G RATIO: 1.4 (ref 1.1–2.2)
ALBUMIN SERPL-MCNC: 4.2 G/DL (ref 3.4–5)
ALP BLD-CCNC: 106 U/L (ref 40–129)
ALT SERPL-CCNC: 8 U/L (ref 10–40)
ANION GAP SERPL CALCULATED.3IONS-SCNC: 16 MMOL/L (ref 3–16)
AST SERPL-CCNC: 18 U/L (ref 15–37)
BILIRUB SERPL-MCNC: 0.5 MG/DL (ref 0–1)
BUN BLDV-MCNC: 30 MG/DL (ref 7–20)
CALCIUM SERPL-MCNC: 9.2 MG/DL (ref 8.3–10.6)
CHLORIDE BLD-SCNC: 99 MMOL/L (ref 99–110)
CHOLESTEROL, TOTAL: 165 MG/DL (ref 0–199)
CO2: 22 MMOL/L (ref 21–32)
CREAT SERPL-MCNC: 1.3 MG/DL (ref 0.6–1.2)
ESTIMATED AVERAGE GLUCOSE: 99.7 MG/DL
GFR AFRICAN AMERICAN: 48
GFR NON-AFRICAN AMERICAN: 40
GLOBULIN: 2.9 G/DL
GLUCOSE BLD-MCNC: 83 MG/DL (ref 70–99)
HBA1C MFR BLD: 5.1 %
HDLC SERPL-MCNC: 43 MG/DL (ref 40–60)
LDL CHOLESTEROL CALCULATED: 89 MG/DL
POTASSIUM SERPL-SCNC: 4.9 MMOL/L (ref 3.5–5.1)
SODIUM BLD-SCNC: 137 MMOL/L (ref 136–145)
TOTAL CK: 35 U/L (ref 26–192)
TOTAL PROTEIN: 7.1 G/DL (ref 6.4–8.2)
TRIGL SERPL-MCNC: 163 MG/DL (ref 0–150)
VLDLC SERPL CALC-MCNC: 33 MG/DL

## 2021-09-23 ENCOUNTER — TELEPHONE (OUTPATIENT)
Dept: FAMILY MEDICINE CLINIC | Age: 76
End: 2021-09-23

## 2021-09-23 NOTE — TELEPHONE ENCOUNTER
----- Message from Kamar Slasarah sent at 9/23/2021  9:59 AM EDT -----  Subject: Referral Request    QUESTIONS   Reason for referral request? Patient has 3 month check up for HTN and DM   on 10/19/2021 and would like orders for blood work. Has the physician seen you for this condition before? No   Preferred Specialist (if applicable)? Do you already have an appointment scheduled? No  Additional Information for Provider? Patient will go to a [facility] lab and   walk in to get the blood work,   ---------------------------------------------------------------------------  --------------  [address]  What is the best way for the office to contact you? OK to leave message on   voicemail  Preferred Call Back Phone Number?  [phone]

## 2021-09-24 DIAGNOSIS — E78.5 HYPERLIPIDEMIA, UNSPECIFIED HYPERLIPIDEMIA TYPE: ICD-10-CM

## 2021-09-24 DIAGNOSIS — E11.8 TYPE 2 DIABETES MELLITUS WITH COMPLICATION, WITHOUT LONG-TERM CURRENT USE OF INSULIN (HCC): Primary | ICD-10-CM

## 2021-10-14 ENCOUNTER — TELEPHONE (OUTPATIENT)
Dept: FAMILY MEDICINE CLINIC | Age: 76
End: 2021-10-14

## 2021-10-19 ENCOUNTER — OFFICE VISIT (OUTPATIENT)
Dept: FAMILY MEDICINE CLINIC | Age: 76
End: 2021-10-19
Payer: MEDICARE

## 2021-10-19 VITALS
SYSTOLIC BLOOD PRESSURE: 134 MMHG | WEIGHT: 194.2 LBS | HEART RATE: 64 BPM | HEIGHT: 66 IN | DIASTOLIC BLOOD PRESSURE: 80 MMHG | TEMPERATURE: 97.1 F | RESPIRATION RATE: 18 BRPM | BODY MASS INDEX: 31.21 KG/M2 | OXYGEN SATURATION: 98 %

## 2021-10-19 DIAGNOSIS — F32.A DEPRESSION, UNSPECIFIED DEPRESSION TYPE: ICD-10-CM

## 2021-10-19 DIAGNOSIS — E11.8 TYPE 2 DIABETES MELLITUS WITH COMPLICATION, WITHOUT LONG-TERM CURRENT USE OF INSULIN (HCC): Primary | ICD-10-CM

## 2021-10-19 DIAGNOSIS — I10 ESSENTIAL HYPERTENSION: ICD-10-CM

## 2021-10-19 DIAGNOSIS — E78.5 HYPERLIPIDEMIA, UNSPECIFIED HYPERLIPIDEMIA TYPE: ICD-10-CM

## 2021-10-19 DIAGNOSIS — Z23 NEED FOR VACCINATION: ICD-10-CM

## 2021-10-19 DIAGNOSIS — N18.9 CHRONIC KIDNEY DISEASE, UNSPECIFIED CKD STAGE: ICD-10-CM

## 2021-10-19 DIAGNOSIS — M85.80 OSTEOPENIA, UNSPECIFIED LOCATION: ICD-10-CM

## 2021-10-19 LAB
BILIRUBIN, POC: NORMAL
BLOOD URINE, POC: NORMAL
CHP ED QC CHECK: NORMAL
CLARITY, POC: CLEAR
COLOR, POC: YELLOW
GLUCOSE BLD-MCNC: 93 MG/DL
GLUCOSE URINE, POC: NORMAL
KETONES, POC: NORMAL
LEUKOCYTE EST, POC: NORMAL
NITRITE, POC: NORMAL
PH, POC: 6
PROTEIN, POC: NORMAL
SPECIFIC GRAVITY, POC: 1
UROBILINOGEN, POC: 0.2

## 2021-10-19 PROCEDURE — 81002 URINALYSIS NONAUTO W/O SCOPE: CPT | Performed by: FAMILY MEDICINE

## 2021-10-19 PROCEDURE — 99214 OFFICE O/P EST MOD 30 MIN: CPT | Performed by: FAMILY MEDICINE

## 2021-10-19 PROCEDURE — 82962 GLUCOSE BLOOD TEST: CPT | Performed by: FAMILY MEDICINE

## 2021-10-19 RX ORDER — SERTRALINE HYDROCHLORIDE 100 MG/1
TABLET, FILM COATED ORAL
Qty: 135 TABLET | Refills: 0 | Status: SHIPPED | OUTPATIENT
Start: 2021-10-19 | End: 2022-01-19 | Stop reason: SDUPTHER

## 2021-10-19 RX ORDER — BISOPROLOL FUMARATE AND HYDROCHLOROTHIAZIDE 10; 6.25 MG/1; MG/1
1 TABLET ORAL DAILY
Qty: 90 TABLET | Refills: 0 | Status: SHIPPED | OUTPATIENT
Start: 2021-10-19 | End: 2022-01-19 | Stop reason: SDUPTHER

## 2021-10-19 RX ORDER — PRAVASTATIN SODIUM 40 MG
40 TABLET ORAL DAILY
Qty: 90 TABLET | Refills: 0 | Status: SHIPPED | OUTPATIENT
Start: 2021-10-19 | End: 2022-01-19 | Stop reason: SDUPTHER

## 2021-10-19 RX ORDER — LISINOPRIL 10 MG/1
10 TABLET ORAL DAILY
Qty: 90 TABLET | Refills: 0 | Status: SHIPPED | OUTPATIENT
Start: 2021-10-19 | End: 2022-01-19 | Stop reason: SDUPTHER

## 2021-10-19 ASSESSMENT — PATIENT HEALTH QUESTIONNAIRE - PHQ9
SUM OF ALL RESPONSES TO PHQ QUESTIONS 1-9: 0
1. LITTLE INTEREST OR PLEASURE IN DOING THINGS: 0
SUM OF ALL RESPONSES TO PHQ9 QUESTIONS 1 & 2: 0
SUM OF ALL RESPONSES TO PHQ QUESTIONS 1-9: 0
SUM OF ALL RESPONSES TO PHQ QUESTIONS 1-9: 0
2. FEELING DOWN, DEPRESSED OR HOPELESS: 0

## 2021-10-19 NOTE — PROGRESS NOTES
SUBJECTIVE:  Rowena Medicus   1945   female   No Known Allergies    Chief Complaint   Patient presents with    Diabetes    Hypertension    3 Month Follow-Up        Patient Active Problem List    Diagnosis Date Noted    10/21/19 RIGHT TKA 10/21/2019    Encounter for postoperative care 10/21/2019    Prerenal azotemia 10/21/2019    Primary osteoarthritis of left knee 01/02/2019    Idiopathic chronic gout of left foot without tophus 10/31/2017    Type 2 diabetes mellitus with complication, without long-term current use of insulin ([facility] [address] 08/07/2016    Depression 03/04/2015    Ventral hernia 06/20/2014    Morbid obesity ([facility] [address] 06/20/2014    Macular degeneration 06/20/2014    Hypertension     Osteopenia     Hyperlipidemia        HPI   Patient is here today for follow-up on diabetes, hyperlipidemia, hypertension, depression, osteopenia and CKD. Patient reports overall she has been feeling well has no specific concerns or questions today. She last lost her  within the last year and reports that she is doing a little better now and is getting out more often and spending more time with friends and family. Denies symptoms of depression or anxiety. Has been sleeping well. She has been trying to adjust her diet and eat less meat and fried foods. Patient denies any chest pain, shortness of breath or myalgias. Denies headache change in vision or neurologic symptoms. Last DEXA scan does show osteopenia. She is on calcium and taking vitamin D. Her vitamin D dose was just recently increased by her nephrologist.  Her last vitamin D level was at 38. Labs overall look okay hemoglobin A1c is only at 5.0. Last cholesterol is at 157 with LDL of 87.   Past Medical History:   Diagnosis Date    CRD (chronic renal disease)     Depression     Herpes dermatitis     left thigh    Hyperlipidemia     Hypertension     Macular degeneration     Obesity     Osteopenia     Squamous acanthoma of face     Type II or unspecified type diabetes mellitus without mention of complication, not stated as uncontrolled      Social History     Socioeconomic History    Marital status:      Spouse name: Not on file    Number of children: Not on file    Years of education: Not on file    Highest education level: Not on file   Occupational History    Not on file   Tobacco Use    Smoking status: Never Smoker    Smokeless tobacco: Never Used   Vaping Use    Vaping Use: Never used   Substance and Sexual Activity    Alcohol use: No     Alcohol/week: 0.0 standard drinks    Drug use: No    Sexual activity: Not on file   Other Topics Concern    Not on file   Social History Narrative    Not on file     Social Determinants of Health     Financial Resource Strain:     Difficulty of Paying Living Expenses:    Food Insecurity:     Worried About Running Out of Food in the Last Year:     [address] in the Last Year:    Transportation Needs:     Lack of Transportation (Medical):  Lack of Transportation (Non-Medical):    Physical Activity:     Days of Exercise per Week:     Minutes of Exercise per Session:    Stress:     Feeling of Stress :    Social Connections:     Frequency of Communication with Friends and Family:     Frequency of Social Gatherings with Friends and Family:     Attends L[employer] day Services:     Active Member of Clubs or Organizations:     Attends Club or Organization Meetings:     Marital Status:    Intimate Partner Violence:     Fear of Current or Ex-Partner:     Emotionally Abused:     Physically Abused:     Sexually Abused:      Family History   Problem Relation Age of Onset    Heart Disease Mother     Alcohol Abuse Mother    Michelle Blank Migraines Mother     Heart Disease Father     Stroke Father     Hypertension Father     Alcohol Abuse Other     Migraines Other        Review of Systems   Constitutional: Negative for activity change, appetite change and unexpected weight change. Respiratory: Negative for cough, chest tightness and shortness of breath. Cardiovascular: Negative for chest pain, palpitations and leg swelling. Gastrointestinal: Negative for abdominal pain, blood in stool, constipation and diarrhea. Endocrine: Negative for cold intolerance and heat intolerance. Musculoskeletal: Negative for arthralgias and myalgias. Skin: Negative for rash. Neurological: Negative for light-headedness and headaches. Hematological: Negative for adenopathy. Does not bruise/bleed easily. Psychiatric/Behavioral: Negative for dysphoric mood, sleep disturbance and suicidal ideas. The patient is not nervous/anxious. OBJECTIVE:  /80   Pulse 64   Temp 97.1 °F (36.2 °C)   Resp 18   Ht 5' 6\" (1.676 m)   Wt 194 lb 3.2 oz (88.1 kg)   SpO2 98%   BMI 31.34 kg/m²   Physical Exam  Vitals and nursing note reviewed. Constitutional:       Appearance: She is well-developed. Eyes:      Pupils: Pupils are equal, round, and reactive to light. Neck:      Thyroid: No thyromegaly. Vascular: No JVD. Cardiovascular:      Rate and Rhythm: Normal rate and regular rhythm. Pulmonary:      Effort: Pulmonary effort is normal.      Breath sounds: Normal breath sounds. Abdominal:      General: Bowel sounds are normal.      Palpations: Abdomen is soft. Tenderness: There is no abdominal tenderness. Musculoskeletal:      Cervical back: Normal range of motion and neck supple. Skin:     Findings: No rash. Neurological:      Mental Status: She is alert and oriented to person, place, and time. Psychiatric:         Behavior: Behavior normal.         Thought Content: Thought content normal.         ASSESSMENT/PLAN:    1.  Type 2 diabetes mellitus with complication, without long-term current use of insulin ([address]  Continue with current management but decrease Januvia to 50 mg daily  Reviewed recent labs  Continue with appropriate diet  - POCT Urinalysis no Micro  - POCT Glucose    2. Hyperlipidemia, unspecified hyperlipidemia type  Reviewed labs  Refill Pravachol  Encouraged appropriate low-cholesterol/low-fat diet  3. Essential hypertension  Refill meds  Ambulatory blood pressure checks  DASH diet reminder    4. Depression, unspecified depression type  Doing well with Zoloft. We will continue and refill Zoloft    5. Osteopenia, unspecified location  Reviewed last DEXA  Continue with increased dietary calcium and recommendations to increase vitamin D to 2000 IUs daily    6. Chronic kidney disease, unspecified CKD stage  Continue with nephrology follow-up    7. Need for vaccination    - INFLUENZA, QUADV, ADJUVANTED, 72 YRS =, IM, PF, PREFILL SYR, 0.5ML (FLUAD)      Orders Placed This Encounter   Procedures    INFLUENZA, QUADV, ADJUVANTED, 65 YRS =, IM, PF, PREFILL SYR, 0.5ML (FLUAD)    POCT Urinalysis no Micro    POCT Glucose     Current Outpatient Medications   Medication Sig Dispense Refill    SITagliptin (JANUVIA) 50 MG tablet Take 1 tablet by mouth daily 90 tablet 0    sertraline (ZOLOFT) 100 MG tablet 1 1/2 po qd 135 tablet 0    pravastatin (PRAVACHOL) 40 MG tablet Take 1 tablet by mouth daily 90 tablet 0    lisinopril (PRINIVIL) 10 MG tablet Take 1 tablet by mouth daily 90 tablet 0    bisoprolol-hydroCHLOROthiazide (ZIAC) 10-6.25 MG per tablet Take 1 tablet by mouth daily 90 tablet 0    glipiZIDE (GLUCOTROL XL) 5 MG extended release tablet TAKE ONE TABLET BY MOUTH DAILY 90 tablet 0    amoxicillin (AMOXIL) 500 MG capsule 2 GRAMS ORALLY 1 HOUR PRIOR TO PROCEDURE 4 capsule 3    amoxicillin (AMOXIL) 500 MG capsule 2 GRAMS ORALLY 1 HOUR PRIOR TO PROCEDURE 4 capsule 3    amoxicillin (AMOXIL) 500 MG capsule Take 4 caps orally 1 hour prior to dentist procedure 4 capsule 0    aspirin 325 MG EC tablet Take 1 tablet by mouth 2 times daily Take  mg.  Twice daily x 14 days; then resume your normal home dose of ASA 28 tablet 3    acetaminophen (TYLENOL) 500 MG tablet Take 1,000 mg by mouth 2 times daily      indomethacin (INDOCIN) 50 MG capsule Take 1 capsule by mouth 2 times daily (with meals) (Patient taking differently: Take 50 mg by mouth as needed ) 20 capsule 0    bevacizumab (AVASTIN) 100 MG/4ML chemo injection inject 0.05mL intraocularly in affected eye.  Handicap Placard MISC by Does not apply route Osteoarthritis, knee pain  1/2019-1/24 1 each 0    Misc. Devices (WALKER) MISC 1 each by Does not apply route daily. Dispense and Fit for injury to left lower extremity and weakness. 1 each 0     No current facility-administered medications for this visit. Return in about 3 months (around 1/19/2022), or if symptoms worsen or fail to improve, for hyperlipidmeia, HTN.     Taiwo Corbett MD, MD

## 2021-10-20 PROCEDURE — 90694 VACC AIIV4 NO PRSRV 0.5ML IM: CPT | Performed by: FAMILY MEDICINE

## 2021-10-20 PROCEDURE — G0008 ADMIN INFLUENZA VIRUS VAC: HCPCS | Performed by: FAMILY MEDICINE

## 2021-10-20 ASSESSMENT — ENCOUNTER SYMPTOMS
COUGH: 0
DIARRHEA: 0
SHORTNESS OF BREATH: 0
CHEST TIGHTNESS: 0
BLOOD IN STOOL: 0
CONSTIPATION: 0
ABDOMINAL PAIN: 0

## 2021-11-23 ENCOUNTER — OFFICE VISIT (OUTPATIENT)
Dept: FAMILY MEDICINE CLINIC | Age: 76
End: 2021-11-23
Payer: MEDICARE

## 2021-11-23 VITALS
RESPIRATION RATE: 18 BRPM | SYSTOLIC BLOOD PRESSURE: 120 MMHG | TEMPERATURE: 96.6 F | BODY MASS INDEX: 31.57 KG/M2 | HEART RATE: 52 BPM | OXYGEN SATURATION: 96 % | WEIGHT: 196.4 LBS | DIASTOLIC BLOOD PRESSURE: 76 MMHG | HEIGHT: 66 IN

## 2021-11-23 DIAGNOSIS — Z00.00 ROUTINE GENERAL MEDICAL EXAMINATION AT A HEALTH CARE FACILITY: Primary | ICD-10-CM

## 2021-11-23 DIAGNOSIS — E11.8 TYPE 2 DIABETES MELLITUS WITH COMPLICATION, WITHOUT LONG-TERM CURRENT USE OF INSULIN (HCC): ICD-10-CM

## 2021-11-23 PROBLEM — I10 HTN (HYPERTENSION): Status: ACTIVE | Noted: 2020-02-04

## 2021-11-23 PROBLEM — E55.9 VITAMIN D DEFICIENCY: Status: ACTIVE | Noted: 2020-02-04

## 2021-11-23 PROBLEM — M10.9 GOUT: Status: ACTIVE | Noted: 2020-02-04

## 2021-11-23 PROBLEM — N18.30 CKD (CHRONIC KIDNEY DISEASE), STAGE III (HCC): Status: ACTIVE | Noted: 2020-02-04

## 2021-11-23 PROBLEM — E11.9 DM2 (DIABETES MELLITUS, TYPE 2) (HCC): Status: ACTIVE | Noted: 2020-02-04

## 2021-11-23 LAB
CHP ED QC CHECK: NORMAL
GLUCOSE BLD-MCNC: 118 MG/DL

## 2021-11-23 PROCEDURE — G0439 PPPS, SUBSEQ VISIT: HCPCS | Performed by: FAMILY MEDICINE

## 2021-11-23 PROCEDURE — 82962 GLUCOSE BLOOD TEST: CPT | Performed by: FAMILY MEDICINE

## 2021-11-23 RX ORDER — ASPIRIN 325 MG
1 TABLET ORAL DAILY
COMMUNITY
End: 2022-05-11 | Stop reason: SDUPTHER

## 2021-11-23 SDOH — ECONOMIC STABILITY: FOOD INSECURITY: WITHIN THE PAST 12 MONTHS, THE FOOD YOU BOUGHT JUST DIDN'T LAST AND YOU DIDN'T HAVE MONEY TO GET MORE.: NEVER TRUE

## 2021-11-23 SDOH — ECONOMIC STABILITY: FOOD INSECURITY: WITHIN THE PAST 12 MONTHS, YOU WORRIED THAT YOUR FOOD WOULD RUN OUT BEFORE YOU GOT MONEY TO BUY MORE.: NEVER TRUE

## 2021-11-23 ASSESSMENT — PATIENT HEALTH QUESTIONNAIRE - PHQ9
SUM OF ALL RESPONSES TO PHQ9 QUESTIONS 1 & 2: 4
7. TROUBLE CONCENTRATING ON THINGS, SUCH AS READING THE NEWSPAPER OR WATCHING TELEVISION: 1
2. FEELING DOWN, DEPRESSED OR HOPELESS: 2
10. IF YOU CHECKED OFF ANY PROBLEMS, HOW DIFFICULT HAVE THESE PROBLEMS MADE IT FOR YOU TO DO YOUR WORK, TAKE CARE OF THINGS AT HOME, OR GET ALONG WITH OTHER PEOPLE: 0
SUM OF ALL RESPONSES TO PHQ QUESTIONS 1-9: 8
8. MOVING OR SPEAKING SO SLOWLY THAT OTHER PEOPLE COULD HAVE NOTICED. OR THE OPPOSITE, BEING SO FIGETY OR RESTLESS THAT YOU HAVE BEEN MOVING AROUND A LOT MORE THAN USUAL: 0
SUM OF ALL RESPONSES TO PHQ QUESTIONS 1-9: 8
3. TROUBLE FALLING OR STAYING ASLEEP: 2
1. LITTLE INTEREST OR PLEASURE IN DOING THINGS: 2
SUM OF ALL RESPONSES TO PHQ QUESTIONS 1-9: 8
5. POOR APPETITE OR OVEREATING: 0
9. THOUGHTS THAT YOU WOULD BE BETTER OFF DEAD, OR OF HURTING YOURSELF: 0
4. FEELING TIRED OR HAVING LITTLE ENERGY: 0
6. FEELING BAD ABOUT YOURSELF - OR THAT YOU ARE A FAILURE OR HAVE LET YOURSELF OR YOUR FAMILY DOWN: 1

## 2021-11-23 ASSESSMENT — SOCIAL DETERMINANTS OF HEALTH (SDOH): HOW HARD IS IT FOR YOU TO PAY FOR THE VERY BASICS LIKE FOOD, HOUSING, MEDICAL CARE, AND HEATING?: NOT HARD AT ALL

## 2021-11-23 ASSESSMENT — LIFESTYLE VARIABLES
AUDIT-C TOTAL SCORE: INCOMPLETE
HOW OFTEN DO YOU HAVE A DRINK CONTAINING ALCOHOL: 0
HOW OFTEN DO YOU HAVE A DRINK CONTAINING ALCOHOL: NEVER
AUDIT TOTAL SCORE: INCOMPLETE

## 2021-11-23 NOTE — PROGRESS NOTES
Medicare Annual Wellness Visit  Name: Hawa Carter Date: 2021   MRN: [id number] Sex: Female   Age: 67 y.o. Ethnicity: Non- / Non    : 1945 Race: White (non-)      Elba Green is here for Medicare AWV    Screenings for behavioral, psychosocial and functional/safety risks, and cognitive dysfunction are all negative except as indicated below. These results, as well as other patient data from the [address] form, are documented in Flowsheets linked to this Encounter. No Known Allergies    Prior to Visit Medications    Medication Sig Taking? Authorizing Provider   aspirin 325 MG tablet Take 1 tablet by mouth daily  Historical Provider, MD   SITagliptin (JANUVIA) 50 MG tablet Take 1 tablet by mouth daily  Pancho Moses MD   sertraline (ZOLOFT) 100 MG tablet 1 1/2 po qd  Yinka Moses MD   pravastatin (PRAVACHOL) 40 MG tablet Take 1 tablet by mouth daily  Pancho Moses MD   lisinopril (PRINIVIL) 10 MG tablet Take 1 tablet by mouth daily  Pancho Moses MD   bisoprolol-hydroCHLOROthiazide (ZIAC) 10-6.25 MG per tablet Take 1 tablet by mouth daily  Pancho Moses MD   glipiZIDE (GLUCOTROL XL) 5 MG extended release tablet TAKE ONE TABLET BY MOUTH DAILY  Pancho Moses MD   amoxicillin (AMOXIL) 500 MG capsule 2 GRAMS ORALLY 1 HOUR PRIOR TO PROCEDURE  Avni Collier MD   amoxicillin (AMOXIL) 500 MG capsule 2 GRAMS ORALLY 1 HOUR PRIOR TO PROCEDURE  Avni Collier MD   amoxicillin (AMOXIL) 500 MG capsule Take 4 caps orally 1 hour prior to dentist procedure  Avni Collier MD   aspirin 325 MG EC tablet Take 1 tablet by mouth 2 times daily Take  mg.  Twice daily x 14 days; then resume your normal home dose of ASA  EMMA Reyes CNP   acetaminophen (TYLENOL) 500 MG tablet Take 1,000 mg by mouth 2 times daily  Historical Provider, MD   indomethacin (INDOCIN) 50 MG capsule Take 1 capsule by mouth 2 times daily (with meals)  Patient taking differently: Take 50 mg by mouth as needed   Quyen Moses MD   bevacizumab (AVASTIN) 100 MG/4ML chemo injection inject 0.05mL intraocularly in affected eye. Historical Provider, MD   Handicap Placard MISC by Does not apply route Osteoarthritis, knee pain  1/2019-1/24  Laura Holguin MD   Misc. Devices (WALKER) MISC 1 each by Does not apply route daily. Dispense and Fit for injury to left lower extremity and weakness.   Jacque Colvin MD       Past Medical History:   Diagnosis Date    CRD (chronic renal disease)     Depression     Herpes dermatitis     left thigh    Hyperlipidemia     Hypertension     Macular degeneration     Obesity     Osteopenia     Squamous acanthoma of face     Type II or unspecified type diabetes mellitus without mention of complication, not stated as uncontrolled        Past Surgical History:   Procedure Laterality Date    ANGIOPLASTY  2003    CHOLECYSTECTOMY  1987    GALLBLADDER SURGERY      Removal-1987    HYSTERECTOMY  1986    TOTAL KNEE ARTHROPLASTY Right 10/21/2019    RIGHT TOTAL KNEE ARTHROPLASTY - DEPUY STANDARD performed by Hayes Moon MD at [facility]       Family History   Problem Relation Age of Onset    Heart Disease Mother     Alcohol Abuse Mother    Parra Migraines Mother     Heart Disease Father     Stroke Father     Hypertension Father     Alcohol Abuse Other     Migraines Other        CareTeam (Including outside providers/suppliers regularly involved in providing care):   Patient Care Team:  Laura Holguin MD as PCP - General (Family Medicine)  Laura Holguin MD as PCP - [facility] Empaneled Provider  Deborah Espinal MD as Consulting Physician (Medical Oncology)  Lewisto Rodriguez (Ophthalmology)    Wt Readings from Last 3 Encounters:   11/23/21 196 lb 6.4 oz (89.1 kg)   10/19/21 194 lb 3.2 oz (88.1 kg)   07/14/21 191 lb 3.2 oz (86.7 kg)     Vitals:    11/23/21 0944   BP: 120/76   Pulse: 52   Resp: 18   Temp: 96.6 °F (35.9 °C)   SpO2: 96%   Weight: 196 lb 6.4 oz (89.1 kg)   Height: 5' 6\" (1.676 m)     Body mass index is 31.7 kg/m². Based upon direct observation of the patient, evaluation of cognition reveals recent and remote memory intact. General Appearance: alert and oriented to person, place and time, well-developed and well-nourished, in no acute distress  Pulmonary/Chest: clear to auscultation bilaterally- no wheezes, rales or rhonchi, normal air movement, no respiratory distress  Cardiovascular: normal rate, intact distal pulses and no carotid bruits  Abdomen: soft, non-tender, non-distended, normal bowel sounds, no masses or organomegaly  Extremities: no cyanosis and no clubbing  Musculoskeletal: normal range of motion, no joint swelling, deformity or tenderness  Neurologic: gait and coordination normal and speech normal    Patient's complete Health Risk Assessment and screening values have been reviewed and are found in Flowsheets. The following problems were reviewed today and where indicated follow up appointments were made and/or referrals ordered. Positive Risk Factor Screenings with Interventions:     Fall Risk:  2 or more falls in past year?: no  Fall with injury in past year?: (!) yes  Fall Risk Interventions:    · Home safety tips provided  · Patient declines any further evaluation/treatment for this issue     Depression:  PHQ-2 Score: 4  PHQ-9 Total Score: 8    Severity:1-4 = minimal depression, 5-9 = mild depression, 10-14 = moderate depression, 15-19 = moderately severe depression, 20-27 = severe depression  Depression Interventions:  ·   · Patient is having a difficult time during holidays since theloss of her .   Patient does report she does have some family support with her daughters and granddaughters and she will be spending the holidays with them  · Relaxation techniques discussed  · Patient declines any further evaluation/treatment for this issue      General Health and ACP:  General  In general, how would you say your health is?: Good  In the past 7 days, have you experienced any of the following?  New or Increased Pain, New or Increased Fatigue, Loneliness, Social Isolation, Stress or Anger?: (!) Social Isolation  Do you get the social and emotional support that you need?: Yes  Do you have a Living Will?: Yes  Advance Directives     Power of  Living Will ACP-Advance Directive ACP-Power of     Not on File Not on File Not on File Not on File      General Health Risk Interventions:  · Stress: Struggling with loss of her  especially in the holidays    Health Habits/Nutrition:  Health Habits/Nutrition  Do you exercise for at least 20 minutes 2-3 times per week?: (!) No  Have you lost any weight without trying in the past 3 months?: No  Do you eat only one meal per day?: No  Have you seen the dentist within the past year?: (!) No  Body mass index: (!) 31.7  Health Habits/Nutrition Interventions:  · Dental exam overdue:  patient encouraged to make appointment with his/her dentist       Personalized Preventive Plan   Current Health Maintenance Status  Immunization History   Administered Date(s) Administered    COVID-19, Gurmeet Drake PF, 30mcg/0.3mL 01/23/2021, 02/10/2021, 09/25/2021    Influenza Vaccine, unspecified formulation 09/17/2016    Influenza Virus Vaccine 11/03/2006, 10/24/2011, 11/25/2013, 09/17/2016    Influenza, High Dose (Fluzone 65 yrs and older) 11/03/2015, 10/31/2017, 10/17/2018, 09/04/2019    Influenza, Quadv, adjuvanted, 65 yrs +, IM, PF (Fluad) 10/06/2020, 10/20/2021    Pneumococcal Conjugate 13-valent ([id number]) 11/03/2015    Pneumococcal Polysaccharide ([id number]) 11/08/2005, 10/31/2017    Td, unspecified formulation 11/08/2005    Tdap (Boostrix, Adacel) 02/15/2016    Zoster Recombinant (Shingrix) 03/27/2018, 06/02/2018        Health Maintenance   Topic Date Due    Annual Wellness Visit (AWV)  09/09/2021    Lipid screen  09/27/2022    Potassium monitoring  09/27/2022    Creatinine monitoring  09/27/2022    DTaP/Tdap/Td vaccine (2 - Td or Tdap) 02/15/2026    DEXA (modify frequency per FRAX score)  Completed    Flu vaccine  Completed    Shingles Vaccine  Completed    Pneumococcal 65+ years Vaccine  Completed    COVID-19 Vaccine  Completed    Hepatitis C screen  Completed    Hepatitis A vaccine  Aged Out    Hib vaccine  Aged Out    Meningococcal (ACWY) vaccine  Aged Out     Recommendations for [employer] Due: see orders and patient instructions/AVS.  . Recommended screening schedule for the next 5-10 years is provided to the patient in written form: see Patient Instructions/AVS.    Iwona Harps was seen today for medicare awv.     Diagnoses and all orders for this visit:    Type 2 diabetes mellitus with complication, without long-term current use of insulin (HCC)  -     POCT Glucose    Routine general medical examination at a health care facility

## 2022-01-19 ENCOUNTER — OFFICE VISIT (OUTPATIENT)
Dept: FAMILY MEDICINE CLINIC | Age: 77
End: 2022-01-19
Payer: MEDICARE

## 2022-01-19 VITALS
DIASTOLIC BLOOD PRESSURE: 84 MMHG | TEMPERATURE: 97.1 F | WEIGHT: 198 LBS | SYSTOLIC BLOOD PRESSURE: 134 MMHG | HEART RATE: 60 BPM | BODY MASS INDEX: 31.82 KG/M2 | OXYGEN SATURATION: 99 % | RESPIRATION RATE: 18 BRPM | HEIGHT: 66 IN

## 2022-01-19 DIAGNOSIS — Z12.31 ENCOUNTER FOR SCREENING MAMMOGRAM FOR MALIGNANT NEOPLASM OF BREAST: ICD-10-CM

## 2022-01-19 DIAGNOSIS — M85.80 OSTEOPENIA, UNSPECIFIED LOCATION: ICD-10-CM

## 2022-01-19 DIAGNOSIS — E11.8 TYPE 2 DIABETES MELLITUS WITH COMPLICATION, WITHOUT LONG-TERM CURRENT USE OF INSULIN (HCC): Primary | ICD-10-CM

## 2022-01-19 DIAGNOSIS — F32.A DEPRESSION, UNSPECIFIED DEPRESSION TYPE: ICD-10-CM

## 2022-01-19 DIAGNOSIS — N18.9 CHRONIC KIDNEY DISEASE, UNSPECIFIED CKD STAGE: ICD-10-CM

## 2022-01-19 DIAGNOSIS — E78.5 HYPERLIPIDEMIA, UNSPECIFIED HYPERLIPIDEMIA TYPE: ICD-10-CM

## 2022-01-19 DIAGNOSIS — I10 ESSENTIAL HYPERTENSION: ICD-10-CM

## 2022-01-19 LAB
BILIRUBIN, POC: NORMAL
BLOOD URINE, POC: NORMAL
CHP ED QC CHECK: NORMAL
CLARITY, POC: CLEAR
COLOR, POC: YELLOW
GLUCOSE BLD-MCNC: 99 MG/DL
GLUCOSE URINE, POC: NORMAL
KETONES, POC: NORMAL
LEUKOCYTE EST, POC: NORMAL
NITRITE, POC: NORMAL
PH, POC: 6
PROTEIN, POC: NORMAL
SPECIFIC GRAVITY, POC: 1.02
UROBILINOGEN, POC: 0.2

## 2022-01-19 PROCEDURE — 99214 OFFICE O/P EST MOD 30 MIN: CPT | Performed by: FAMILY MEDICINE

## 2022-01-19 PROCEDURE — 81002 URINALYSIS NONAUTO W/O SCOPE: CPT | Performed by: FAMILY MEDICINE

## 2022-01-19 PROCEDURE — 82962 GLUCOSE BLOOD TEST: CPT | Performed by: FAMILY MEDICINE

## 2022-01-19 RX ORDER — SERTRALINE HYDROCHLORIDE 100 MG/1
TABLET, FILM COATED ORAL
Qty: 135 TABLET | Refills: 0 | Status: SHIPPED | OUTPATIENT
Start: 2022-01-19 | End: 2022-04-25

## 2022-01-19 RX ORDER — LISINOPRIL 10 MG/1
10 TABLET ORAL DAILY
Qty: 90 TABLET | Refills: 0 | Status: SHIPPED | OUTPATIENT
Start: 2022-01-19 | End: 2022-04-25

## 2022-01-19 RX ORDER — PRAVASTATIN SODIUM 40 MG
40 TABLET ORAL DAILY
Qty: 90 TABLET | Refills: 0 | Status: SHIPPED | OUTPATIENT
Start: 2022-01-19 | End: 2022-04-25

## 2022-01-19 RX ORDER — BISOPROLOL FUMARATE AND HYDROCHLOROTHIAZIDE 10; 6.25 MG/1; MG/1
1 TABLET ORAL DAILY
Qty: 90 TABLET | Refills: 0 | Status: SHIPPED | OUTPATIENT
Start: 2022-01-19 | End: 2022-04-25

## 2022-01-19 ASSESSMENT — ENCOUNTER SYMPTOMS
COUGH: 0
CONSTIPATION: 0
ABDOMINAL PAIN: 0
SHORTNESS OF BREATH: 0
DIARRHEA: 0
CHEST TIGHTNESS: 0
BLOOD IN STOOL: 0

## 2022-01-19 ASSESSMENT — PATIENT HEALTH QUESTIONNAIRE - PHQ9
SUM OF ALL RESPONSES TO PHQ QUESTIONS 1-9: 0
SUM OF ALL RESPONSES TO PHQ QUESTIONS 1-9: 0
SUM OF ALL RESPONSES TO PHQ9 QUESTIONS 1 & 2: 0
2. FEELING DOWN, DEPRESSED OR HOPELESS: 0
1. LITTLE INTEREST OR PLEASURE IN DOING THINGS: 0
SUM OF ALL RESPONSES TO PHQ QUESTIONS 1-9: 0
SUM OF ALL RESPONSES TO PHQ QUESTIONS 1-9: 0

## 2022-01-19 NOTE — PROGRESS NOTES
SUBJECTIVE:  Edda Villegas   1945   female   No Known Allergies    Chief Complaint   Patient presents with    3 Month Follow-Up    Hypertension    Hyperlipidemia    Diabetes        Patient Active Problem List    Diagnosis Date Noted    CKD (chronic kidney disease), stage III ([facility] 02/04/2020    Gout 02/04/2020     Formatting of this note might be different from the original.  off allopurinol      Vitamin D deficiency 02/04/2020    HTN (hypertension) 02/04/2020    DM2 (diabetes mellitus, type 2) ([address] 02/04/2020     Formatting of this note might be different from the original.  controlled her HgA1C was 6.  10/21/19 RIGHT TKA 10/21/2019    Encounter for postoperative care 10/21/2019    Prerenal azotemia 10/21/2019    Primary osteoarthritis of left knee 01/02/2019    Idiopathic chronic gout of left foot without tophus 10/31/2017    Type 2 diabetes mellitus with complication, without long-term current use of insulin ([facility] [address] 08/07/2016    Depression 03/04/2015    Ventral hernia 06/20/2014    Morbid obesity ([address] 06/20/2014    Macular degeneration 06/20/2014    Hypertension     Osteopenia     Hyperlipidemia     Squamous cell carcinoma of skin 11/16/2011       HPI   Patient is here today for follow-up on diabetes, hyperlipidemia, hypertension, depression/anxiety, CRD, osteopenia. Patient reports overall has been feeling well has no specific concerns or questions today. She did have a tough time around the holidays since she has lost her . Reports she did end up spending time with her daughter and grandchildren. She has been doing well on current meds. Patient denies chest pain, shortness of breath or myalgias. Denies headache change in vision or any neurologic symptoms. No GI or bowel concerns. Last hemoglobin A1c was very good at 5.0. She has had no episodes of hypoglycemia. Cholesterol was good with total cholesterol 157 and LDL of 87.  Patient has history of chronic renal disease and currently followed by nephrology. Patient denies symptoms of depression or anxiety. Doing well on Zoloft. Sleeping well. No other concerns or questions today. Past Medical History:   Diagnosis Date    CRD (chronic renal disease)     Depression     Herpes dermatitis     left thigh    Hyperlipidemia     Hypertension     Macular degeneration     Obesity     Osteopenia     Squamous acanthoma of face     Type II or unspecified type diabetes mellitus without mention of complication, not stated as uncontrolled      Social History     Socioeconomic History    Marital status:      Spouse name: Not on file    Number of children: Not on file    Years of education: Not on file    Highest education level: Not on file   Occupational History    Not on file   Tobacco Use    Smoking status: Never Smoker    Smokeless tobacco: Never Used   Vaping Use    Vaping Use: Never used   Substance and Sexual Activity    Alcohol use: No     Alcohol/week: 0.0 standard drinks    Drug use: No    Sexual activity: Not on file   Other Topics Concern    Not on file   Social History Narrative    Not on file     Social Determinants of Health     Financial Resource Strain: Low Risk     Difficulty of Paying Living Expenses: Not hard at all   Food Insecurity: No Food Insecurity    Worried About Running Out of Food in the Last Year: Never true    Ghassan of Food in the Last Year: Never true   Transportation Needs:     Lack of Transportation (Medical): Not on file    Lack of Transportation (Non-Medical):  Not on file   Physical Activity:     Days of Exercise per Week: Not on file    Minutes of Exercise per Session: Not on file   Stress:     Feeling of Stress : Not on file   Social Connections:     Frequency of Communication with Friends and Family: Not on file    Frequency of Social Gatherings with Friends and Family: Not on file    Attends Shinto Services: Not on file   [employer] of Clubs or Organizations: Not on file    Attends Club or Organization Meetings: Not on file    Marital Status: Not on file   Intimate Partner Violence:     Fear of Current or Ex-Partner: Not on file    Emotionally Abused: Not on file    Physically Abused: Not on file    Sexually Abused: Not on file   Housing Stability:     Unable to Pay for Housing in the Last Year: Not on file    Number of [place] in the Last Year: Not on file    Unstable Housing in the Last Year: Not on file     Family History   Problem Relation Age of Onset    Heart Disease Mother     Alcohol Abuse Mother     Migraines Mother     Heart Disease Father     Stroke Father     Hypertension Father     Alcohol Abuse Other     Migraines Other        Review of Systems   Constitutional: Negative for activity change, appetite change and unexpected weight change. Respiratory: Negative for cough, chest tightness and shortness of breath. Cardiovascular: Negative for chest pain, palpitations and leg swelling. Gastrointestinal: Negative for abdominal pain, blood in stool, constipation and diarrhea. Musculoskeletal: Negative for arthralgias and myalgias. Skin: Negative for rash. Neurological: Negative for light-headedness and headaches. Hematological: Negative for adenopathy. Does not bruise/bleed easily. Psychiatric/Behavioral: Negative for dysphoric mood, sleep disturbance and suicidal ideas. The patient is not nervous/anxious. OBJECTIVE:  /84   Pulse 60   Temp 97.1 °F (36.2 °C)   Resp 18   Ht 5' 6\" (1.676 m)   Wt 198 lb (89.8 kg)   SpO2 99%   BMI 31.96 kg/m²   Physical Exam  Constitutional:       Appearance: She is well-developed. Eyes:      Pupils: Pupils are equal, round, and reactive to light. Neck:      Thyroid: No thyromegaly. Vascular: No JVD. Cardiovascular:      Rate and Rhythm: Normal rate and regular rhythm.    Pulmonary:      Effort: Pulmonary effort is normal.      Breath sounds: Normal breath sounds. Abdominal:      General: Bowel sounds are normal.      Palpations: Abdomen is soft. Tenderness: There is no abdominal tenderness. Musculoskeletal:      Cervical back: Normal range of motion and neck supple. Skin:     Findings: No rash. Neurological:      Mental Status: She is alert and oriented to person, place, and time. Psychiatric:         Behavior: Behavior normal.         Thought Content: Thought content normal.         ASSESSMENT/PLAN:    1. Type 2 diabetes mellitus with complication, without long-term current use of insulin ([address]  Continue with current management refill meds  Reviewed last labs  Follow-up labs with next visit  - POCT Urinalysis no Micro  - POCT Glucose  - Hemoglobin A1C; Future    2. Hyperlipidemia, unspecified hyperlipidemia type  Refill Pravachol  Appropriate low-cholesterol/low-fat diet  - Comprehensive Metabolic Panel; Future  - CBC Auto Differential; Future  - Lipid Panel; Future    3. Essential hypertension  Refill meds  Ambulatory blood pressure checks  DASH diet reminder    4. Depression, unspecified depression type  Stress management. Continue and refill Zoloft    5. Chronic kidney disease, unspecified CKD stage  Follow-up with nephrology as advised    6. Osteopenia, unspecified location    - DEXA BONE DENSITY 2 SITES; Future    7. Encounter for screening mammogram for malignant neoplasm of breast    - SUZANNE DIGITAL SCREEN W OR WO CAD BILATERAL;  Future      Orders Placed This Encounter   Procedures    DEXA BONE DENSITY 2 SITES     Standing Status:   Future     Standing Expiration Date:   1/19/2023    SUZANNE DIGITAL SCREEN W OR WO CAD BILATERAL     Standing Status:   Future     Standing Expiration Date:   3/19/2023    Comprehensive Metabolic Panel     Standing Status:   Future     Standing Expiration Date:   2/8/2023    CBC Auto Differential     Standing Status:   Future     Standing Expiration Date:   2/8/2023    Hemoglobin A1C     Standing Status: Future     Standing Expiration Date:   2/8/2023    Lipid Panel     Standing Status:   Future     Standing Expiration Date:   2/8/2023     Order Specific Question:   Is Patient Fasting?/# of Hours     Answer:   10 hrs    POCT Urinalysis no Micro    POCT Glucose     Current Outpatient Medications   Medication Sig Dispense Refill    SITagliptin (JANUVIA) 50 MG tablet Take 1 tablet by mouth daily 90 tablet 0    sertraline (ZOLOFT) 100 MG tablet 1 1/2 po qd 135 tablet 0    pravastatin (PRAVACHOL) 40 MG tablet Take 1 tablet by mouth daily 90 tablet 0    lisinopril (PRINIVIL) 10 MG tablet Take 1 tablet by mouth daily 90 tablet 0    bisoprolol-hydroCHLOROthiazide (ZIAC) 10-6.25 MG per tablet Take 1 tablet by mouth daily 90 tablet 0    aspirin 325 MG tablet Take 1 tablet by mouth daily      glipiZIDE (GLUCOTROL XL) 5 MG extended release tablet TAKE ONE TABLET BY MOUTH DAILY 90 tablet 0    amoxicillin (AMOXIL) 500 MG capsule 2 GRAMS ORALLY 1 HOUR PRIOR TO PROCEDURE 4 capsule 3    amoxicillin (AMOXIL) 500 MG capsule 2 GRAMS ORALLY 1 HOUR PRIOR TO PROCEDURE 4 capsule 3    amoxicillin (AMOXIL) 500 MG capsule Take 4 caps orally 1 hour prior to dentist procedure 4 capsule 0    aspirin 325 MG EC tablet Take 1 tablet by mouth 2 times daily Take  mg. Twice daily x 14 days; then resume your normal home dose of ASA 28 tablet 3    acetaminophen (TYLENOL) 500 MG tablet Take 1,000 mg by mouth 2 times daily      indomethacin (INDOCIN) 50 MG capsule Take 1 capsule by mouth 2 times daily (with meals) (Patient taking differently: Take 50 mg by mouth as needed ) 20 capsule 0    bevacizumab (AVASTIN) 100 MG/4ML chemo injection inject 0.05mL intraocularly in affected eye.  Handicap Placard MISC by Does not apply route Osteoarthritis, knee pain  1/2019-1/24 1 each 0    Misc. Devices (WALKER) MISC 1 each by Does not apply route daily. Dispense and Fit for injury to left lower extremity and weakness.  1 each 0     No current facility-administered medications for this visit. Return in about 3 months (around 4/19/2022), or if symptoms worsen or fail to improve, for HTN, hyperlipidemia.     Paxton Vaca MD, MD

## 2022-01-20 DIAGNOSIS — E11.8 TYPE 2 DIABETES MELLITUS WITH COMPLICATION, WITHOUT LONG-TERM CURRENT USE OF INSULIN (HCC): ICD-10-CM

## 2022-01-20 DIAGNOSIS — E78.5 HYPERLIPIDEMIA, UNSPECIFIED HYPERLIPIDEMIA TYPE: ICD-10-CM

## 2022-01-20 LAB
A/G RATIO: 1.7 (ref 1.1–2.2)
ALBUMIN SERPL-MCNC: 4.3 G/DL (ref 3.4–5)
ALP BLD-CCNC: 118 U/L (ref 40–129)
ALT SERPL-CCNC: 10 U/L (ref 10–40)
ANION GAP SERPL CALCULATED.3IONS-SCNC: 14 MMOL/L (ref 3–16)
AST SERPL-CCNC: 14 U/L (ref 15–37)
BASOPHILS ABSOLUTE: 0 K/UL (ref 0–0.2)
BASOPHILS RELATIVE PERCENT: 0.6 %
BILIRUB SERPL-MCNC: 0.4 MG/DL (ref 0–1)
BUN BLDV-MCNC: 26 MG/DL (ref 7–20)
CALCIUM SERPL-MCNC: 9.4 MG/DL (ref 8.3–10.6)
CHLORIDE BLD-SCNC: 100 MMOL/L (ref 99–110)
CHOLESTEROL, TOTAL: 164 MG/DL (ref 0–199)
CO2: 27 MMOL/L (ref 21–32)
CREAT SERPL-MCNC: 1.3 MG/DL (ref 0.6–1.2)
EOSINOPHILS ABSOLUTE: 0.2 K/UL (ref 0–0.6)
EOSINOPHILS RELATIVE PERCENT: 2.4 %
GFR AFRICAN AMERICAN: 48
GFR NON-AFRICAN AMERICAN: 40
GLUCOSE BLD-MCNC: 100 MG/DL (ref 70–99)
HCT VFR BLD CALC: 37.4 % (ref 36–48)
HDLC SERPL-MCNC: 54 MG/DL (ref 40–60)
HEMOGLOBIN: 12.6 G/DL (ref 12–16)
LDL CHOLESTEROL CALCULATED: 80 MG/DL
LYMPHOCYTES ABSOLUTE: 0.9 K/UL (ref 1–5.1)
LYMPHOCYTES RELATIVE PERCENT: 10.9 %
MCH RBC QN AUTO: 31.6 PG (ref 26–34)
MCHC RBC AUTO-ENTMCNC: 33.7 G/DL (ref 31–36)
MCV RBC AUTO: 93.7 FL (ref 80–100)
MONOCYTES ABSOLUTE: 0.4 K/UL (ref 0–1.3)
MONOCYTES RELATIVE PERCENT: 4.9 %
NEUTROPHILS ABSOLUTE: 6.8 K/UL (ref 1.7–7.7)
NEUTROPHILS RELATIVE PERCENT: 81.2 %
PDW BLD-RTO: 13.8 % (ref 12.4–15.4)
PLATELET # BLD: 257 K/UL (ref 135–450)
PMV BLD AUTO: 7.7 FL (ref 5–10.5)
POTASSIUM SERPL-SCNC: 4.8 MMOL/L (ref 3.5–5.1)
RBC # BLD: 3.99 M/UL (ref 4–5.2)
SODIUM BLD-SCNC: 141 MMOL/L (ref 136–145)
TOTAL PROTEIN: 6.8 G/DL (ref 6.4–8.2)
TRIGL SERPL-MCNC: 148 MG/DL (ref 0–150)
VLDLC SERPL CALC-MCNC: 30 MG/DL
WBC # BLD: 8.4 K/UL (ref 4–11)

## 2022-01-21 LAB
ESTIMATED AVERAGE GLUCOSE: 102.5 MG/DL
HBA1C MFR BLD: 5.2 %

## 2022-01-24 ENCOUNTER — TELEPHONE (OUTPATIENT)
Dept: FAMILY MEDICINE CLINIC | Age: 77
End: 2022-01-24

## 2022-01-24 NOTE — TELEPHONE ENCOUNTER
procedure scheduling called stating that the dexascan order codes are incorrect and need to be changed in order to get scheduled.

## 2022-01-25 DIAGNOSIS — Z78.0 MENOPAUSE: Primary | ICD-10-CM

## 2022-03-01 ENCOUNTER — HOSPITAL ENCOUNTER (OUTPATIENT)
Dept: GENERAL RADIOLOGY | Age: 77
Discharge: HOME | End: 2022-03-01
Payer: MEDICARE

## 2022-03-01 ENCOUNTER — HOSPITAL ENCOUNTER (OUTPATIENT)
Dept: WOMENS IMAGING | Age: 77
Discharge: HOME | End: 2022-03-01
Payer: MEDICARE

## 2022-03-01 VITALS — BODY MASS INDEX: 30.7 KG/M2 | WEIGHT: 191 LBS | HEIGHT: 66 IN

## 2022-03-01 DIAGNOSIS — Z78.0 MENOPAUSE: ICD-10-CM

## 2022-03-01 DIAGNOSIS — Z12.31 ENCOUNTER FOR SCREENING MAMMOGRAM FOR MALIGNANT NEOPLASM OF BREAST: ICD-10-CM

## 2022-03-01 PROCEDURE — 77080 DXA BONE DENSITY AXIAL: CPT

## 2022-03-01 PROCEDURE — 77067 SCR MAMMO BI INCL CAD: CPT

## 2022-04-19 ENCOUNTER — TELEPHONE (OUTPATIENT)
Dept: FAMILY MEDICINE CLINIC | Age: 77
End: 2022-04-19

## 2022-04-19 RX ORDER — SITAGLIPTIN 100 MG/1
TABLET, FILM COATED ORAL
Qty: 90 TABLET | Refills: 0 | OUTPATIENT
Start: 2022-04-19

## 2022-04-19 RX ORDER — SERTRALINE HYDROCHLORIDE 100 MG/1
TABLET, FILM COATED ORAL
Qty: 135 TABLET | Refills: 0 | OUTPATIENT
Start: 2022-04-19

## 2022-04-19 RX ORDER — PRAVASTATIN SODIUM 40 MG
TABLET ORAL
Qty: 90 TABLET | Refills: 0 | OUTPATIENT
Start: 2022-04-19

## 2022-04-19 RX ORDER — BISOPROLOL FUMARATE AND HYDROCHLOROTHIAZIDE 10; 6.25 MG/1; MG/1
TABLET ORAL
Qty: 90 TABLET | Refills: 0 | OUTPATIENT
Start: 2022-04-19

## 2022-04-19 RX ORDER — LISINOPRIL 10 MG/1
TABLET ORAL
Qty: 90 TABLET | Refills: 0 | OUTPATIENT
Start: 2022-04-19

## 2022-04-19 NOTE — TELEPHONE ENCOUNTER
----- Message from Ishan Kitchen sent at 4/19/2022  9:57 AM EDT -----  Subject: Referral Request    QUESTIONS   Reason for referral request? Pt is requesting to have bloodwork done prior   to her appt on 5/11. (DM Follow Up) Can you please place the orders and   reach out to her to let her know where she can get that bloodwork done at? Thank you. Has the physician seen you for this condition before? No   Preferred Specialist (if applicable)? Do you already have an appointment scheduled? Additional Information for Provider?   ---------------------------------------------------------------------------  --------------  CALL BACK INFO  What is the best way for the office to contact you? OK to leave message on   voicemail  Preferred Call Back Phone Number? [phone]  ---------------------------------------------------------------------------  --------------  SCRIPT ANSWERS  Relationship to Patient?  Self

## 2022-04-20 DIAGNOSIS — E78.5 HYPERLIPIDEMIA, UNSPECIFIED HYPERLIPIDEMIA TYPE: ICD-10-CM

## 2022-04-20 DIAGNOSIS — E11.8 TYPE 2 DIABETES MELLITUS WITH COMPLICATION, WITHOUT LONG-TERM CURRENT USE OF INSULIN (HCC): ICD-10-CM

## 2022-04-20 DIAGNOSIS — I10 ESSENTIAL HYPERTENSION: Primary | ICD-10-CM

## 2022-04-25 RX ORDER — SERTRALINE HYDROCHLORIDE 100 MG/1
TABLET, FILM COATED ORAL
Qty: 24 TABLET | Refills: 0 | Status: SHIPPED | OUTPATIENT
Start: 2022-04-25 | End: 2022-05-04

## 2022-04-25 RX ORDER — PRAVASTATIN SODIUM 40 MG
TABLET ORAL
Qty: 16 TABLET | Refills: 0 | Status: SHIPPED | OUTPATIENT
Start: 2022-04-25 | End: 2022-05-04

## 2022-04-25 RX ORDER — LISINOPRIL 10 MG/1
TABLET ORAL
Qty: 16 TABLET | Refills: 0 | Status: SHIPPED | OUTPATIENT
Start: 2022-04-25 | End: 2022-05-04

## 2022-04-25 RX ORDER — BISOPROLOL FUMARATE AND HYDROCHLOROTHIAZIDE 10; 6.25 MG/1; MG/1
TABLET ORAL
Qty: 16 TABLET | Refills: 0 | Status: SHIPPED | OUTPATIENT
Start: 2022-04-25 | End: 2022-05-04

## 2022-04-26 DIAGNOSIS — E78.5 HYPERLIPIDEMIA, UNSPECIFIED HYPERLIPIDEMIA TYPE: ICD-10-CM

## 2022-04-26 DIAGNOSIS — E11.8 TYPE 2 DIABETES MELLITUS WITH COMPLICATION, WITHOUT LONG-TERM CURRENT USE OF INSULIN (HCC): ICD-10-CM

## 2022-04-26 LAB
A/G RATIO: 1.8 (ref 1.1–2.2)
ALBUMIN SERPL-MCNC: 4.4 G/DL (ref 3.4–5)
ALP BLD-CCNC: 114 U/L (ref 40–129)
ALT SERPL-CCNC: 11 U/L (ref 10–40)
ANION GAP SERPL CALCULATED.3IONS-SCNC: 13 MMOL/L (ref 3–16)
AST SERPL-CCNC: 16 U/L (ref 15–37)
BASOPHILS ABSOLUTE: 0 K/UL (ref 0–0.2)
BASOPHILS RELATIVE PERCENT: 0.5 %
BILIRUB SERPL-MCNC: 0.5 MG/DL (ref 0–1)
BUN BLDV-MCNC: 32 MG/DL (ref 7–20)
CALCIUM SERPL-MCNC: 9.3 MG/DL (ref 8.3–10.6)
CHLORIDE BLD-SCNC: 99 MMOL/L (ref 99–110)
CHOLESTEROL, TOTAL: 188 MG/DL (ref 0–199)
CO2: 25 MMOL/L (ref 21–32)
CREAT SERPL-MCNC: 1.1 MG/DL (ref 0.6–1.2)
EOSINOPHILS ABSOLUTE: 0.2 K/UL (ref 0–0.6)
EOSINOPHILS RELATIVE PERCENT: 2 %
GFR AFRICAN AMERICAN: 58
GFR NON-AFRICAN AMERICAN: 48
GLUCOSE BLD-MCNC: 98 MG/DL (ref 70–99)
HCT VFR BLD CALC: 38.6 % (ref 36–48)
HDLC SERPL-MCNC: 57 MG/DL (ref 40–60)
HEMOGLOBIN: 13.2 G/DL (ref 12–16)
LDL CHOLESTEROL CALCULATED: 100 MG/DL
LYMPHOCYTES ABSOLUTE: 1.3 K/UL (ref 1–5.1)
LYMPHOCYTES RELATIVE PERCENT: 13 %
MCH RBC QN AUTO: 31.6 PG (ref 26–34)
MCHC RBC AUTO-ENTMCNC: 34.1 G/DL (ref 31–36)
MCV RBC AUTO: 92.7 FL (ref 80–100)
MONOCYTES ABSOLUTE: 0.4 K/UL (ref 0–1.3)
MONOCYTES RELATIVE PERCENT: 4.3 %
NEUTROPHILS ABSOLUTE: 7.8 K/UL (ref 1.7–7.7)
NEUTROPHILS RELATIVE PERCENT: 80.2 %
PDW BLD-RTO: 13.9 % (ref 12.4–15.4)
PLATELET # BLD: 259 K/UL (ref 135–450)
PMV BLD AUTO: 7.1 FL (ref 5–10.5)
POTASSIUM SERPL-SCNC: 5 MMOL/L (ref 3.5–5.1)
RBC # BLD: 4.16 M/UL (ref 4–5.2)
SODIUM BLD-SCNC: 137 MMOL/L (ref 136–145)
TOTAL PROTEIN: 6.8 G/DL (ref 6.4–8.2)
TRIGL SERPL-MCNC: 154 MG/DL (ref 0–150)
VLDLC SERPL CALC-MCNC: 31 MG/DL
WBC # BLD: 9.8 K/UL (ref 4–11)

## 2022-04-27 LAB
ESTIMATED AVERAGE GLUCOSE: 105.4 MG/DL
HBA1C MFR BLD: 5.3 %

## 2022-05-04 RX ORDER — PRAVASTATIN SODIUM 40 MG
TABLET ORAL
Qty: 7 TABLET | Refills: 0 | Status: SHIPPED | OUTPATIENT
Start: 2022-05-04 | End: 2022-05-11 | Stop reason: SDUPTHER

## 2022-05-04 RX ORDER — SERTRALINE HYDROCHLORIDE 100 MG/1
TABLET, FILM COATED ORAL
Qty: 16 TABLET | Refills: 0 | Status: SHIPPED | OUTPATIENT
Start: 2022-05-04 | End: 2022-05-11 | Stop reason: SDUPTHER

## 2022-05-04 RX ORDER — BISOPROLOL FUMARATE AND HYDROCHLOROTHIAZIDE 10; 6.25 MG/1; MG/1
TABLET ORAL
Qty: 7 TABLET | Refills: 0 | Status: SHIPPED | OUTPATIENT
Start: 2022-05-04 | End: 2022-05-11 | Stop reason: SDUPTHER

## 2022-05-04 RX ORDER — LISINOPRIL 10 MG/1
TABLET ORAL
Qty: 7 TABLET | Refills: 0 | Status: SHIPPED | OUTPATIENT
Start: 2022-05-04 | End: 2022-05-11 | Stop reason: SDUPTHER

## 2022-05-11 ENCOUNTER — OFFICE VISIT (OUTPATIENT)
Dept: FAMILY MEDICINE CLINIC | Age: 77
End: 2022-05-11
Payer: MEDICARE

## 2022-05-11 VITALS
DIASTOLIC BLOOD PRESSURE: 62 MMHG | BODY MASS INDEX: 30.83 KG/M2 | WEIGHT: 191 LBS | OXYGEN SATURATION: 98 % | HEART RATE: 49 BPM | SYSTOLIC BLOOD PRESSURE: 130 MMHG | TEMPERATURE: 97.1 F

## 2022-05-11 DIAGNOSIS — M85.80 OSTEOPENIA, UNSPECIFIED LOCATION: ICD-10-CM

## 2022-05-11 DIAGNOSIS — I10 ESSENTIAL HYPERTENSION: ICD-10-CM

## 2022-05-11 DIAGNOSIS — N18.9 CHRONIC KIDNEY DISEASE, UNSPECIFIED CKD STAGE: ICD-10-CM

## 2022-05-11 DIAGNOSIS — E78.5 HYPERLIPIDEMIA, UNSPECIFIED HYPERLIPIDEMIA TYPE: ICD-10-CM

## 2022-05-11 DIAGNOSIS — F32.A DEPRESSION, UNSPECIFIED DEPRESSION TYPE: ICD-10-CM

## 2022-05-11 DIAGNOSIS — E11.8 TYPE 2 DIABETES MELLITUS WITH COMPLICATION, WITHOUT LONG-TERM CURRENT USE OF INSULIN (HCC): Primary | ICD-10-CM

## 2022-05-11 LAB
BILIRUBIN, POC: NEGATIVE
BLOOD URINE, POC: NEGATIVE
CHP ED QC CHECK: NORMAL
CLARITY, POC: CLEAR
COLOR, POC: YELLOW
GLUCOSE BLD-MCNC: 120 MG/DL
GLUCOSE URINE, POC: NEGATIVE
KETONES, POC: NEGATIVE
LEUKOCYTE EST, POC: NORMAL
NITRITE, POC: NEGATIVE
PH, POC: 5
PROTEIN, POC: NEGATIVE
SPECIFIC GRAVITY, POC: 1.02
UROBILINOGEN, POC: 0.2

## 2022-05-11 PROCEDURE — 82962 GLUCOSE BLOOD TEST: CPT | Performed by: FAMILY MEDICINE

## 2022-05-11 PROCEDURE — 81002 URINALYSIS NONAUTO W/O SCOPE: CPT | Performed by: FAMILY MEDICINE

## 2022-05-11 PROCEDURE — 99214 OFFICE O/P EST MOD 30 MIN: CPT | Performed by: FAMILY MEDICINE

## 2022-05-11 PROCEDURE — 3044F HG A1C LEVEL LT 7.0%: CPT | Performed by: FAMILY MEDICINE

## 2022-05-11 RX ORDER — PRAVASTATIN SODIUM 40 MG
TABLET ORAL
Qty: 90 TABLET | Refills: 0 | Status: SHIPPED | OUTPATIENT
Start: 2022-05-11 | End: 2022-08-10 | Stop reason: SDUPTHER

## 2022-05-11 RX ORDER — SERTRALINE HYDROCHLORIDE 100 MG/1
TABLET, FILM COATED ORAL
Qty: 90 TABLET | Refills: 0 | Status: SHIPPED | OUTPATIENT
Start: 2022-05-11 | End: 2022-08-10 | Stop reason: SDUPTHER

## 2022-05-11 RX ORDER — INDOMETHACIN 50 MG/1
50 CAPSULE ORAL 2 TIMES DAILY WITH MEALS
Qty: 20 CAPSULE | Refills: 0 | Status: CANCELLED | OUTPATIENT
Start: 2022-05-11

## 2022-05-11 RX ORDER — BISOPROLOL FUMARATE AND HYDROCHLOROTHIAZIDE 10; 6.25 MG/1; MG/1
TABLET ORAL
Qty: 90 TABLET | Refills: 0 | Status: SHIPPED | OUTPATIENT
Start: 2022-05-11 | End: 2022-08-10 | Stop reason: SDUPTHER

## 2022-05-11 RX ORDER — LISINOPRIL 10 MG/1
TABLET ORAL
Qty: 90 TABLET | Refills: 0 | Status: SHIPPED | OUTPATIENT
Start: 2022-05-11 | End: 2022-08-10 | Stop reason: SDUPTHER

## 2022-05-11 ASSESSMENT — ENCOUNTER SYMPTOMS
DIARRHEA: 0
CHEST TIGHTNESS: 0
COUGH: 0
CONSTIPATION: 0
SHORTNESS OF BREATH: 0
BLOOD IN STOOL: 0
ABDOMINAL PAIN: 0

## 2022-05-11 ASSESSMENT — PATIENT HEALTH QUESTIONNAIRE - PHQ9
7. TROUBLE CONCENTRATING ON THINGS, SUCH AS READING THE NEWSPAPER OR WATCHING TELEVISION: 0
4. FEELING TIRED OR HAVING LITTLE ENERGY: 0
SUM OF ALL RESPONSES TO PHQ9 QUESTIONS 1 & 2: 0
2. FEELING DOWN, DEPRESSED OR HOPELESS: 0
3. TROUBLE FALLING OR STAYING ASLEEP: 0
1. LITTLE INTEREST OR PLEASURE IN DOING THINGS: 0
SUM OF ALL RESPONSES TO PHQ QUESTIONS 1-9: 0
SUM OF ALL RESPONSES TO PHQ QUESTIONS 1-9: 0
9. THOUGHTS THAT YOU WOULD BE BETTER OFF DEAD, OR OF HURTING YOURSELF: 0
SUM OF ALL RESPONSES TO PHQ QUESTIONS 1-9: 0
SUM OF ALL RESPONSES TO PHQ QUESTIONS 1-9: 0
10. IF YOU CHECKED OFF ANY PROBLEMS, HOW DIFFICULT HAVE THESE PROBLEMS MADE IT FOR YOU TO DO YOUR WORK, TAKE CARE OF THINGS AT HOME, OR GET ALONG WITH OTHER PEOPLE: 0
6. FEELING BAD ABOUT YOURSELF - OR THAT YOU ARE A FAILURE OR HAVE LET YOURSELF OR YOUR FAMILY DOWN: 0
5. POOR APPETITE OR OVEREATING: 0
8. MOVING OR SPEAKING SO SLOWLY THAT OTHER PEOPLE COULD HAVE NOTICED. OR THE OPPOSITE, BEING SO FIGETY OR RESTLESS THAT YOU HAVE BEEN MOVING AROUND A LOT MORE THAN USUAL: 0

## 2022-05-11 NOTE — PROGRESS NOTES
SUBJECTIVE:  Yessi Dillwyn   1945   female   No Known Allergies    Chief Complaint   Patient presents with    Diabetes        Patient Active Problem List    Diagnosis Date Noted    CKD (chronic kidney disease), stage III ([facility] 02/04/2020    Gout 02/04/2020     Formatting of this note might be different from the original.  off allopurinol      Vitamin D deficiency 02/04/2020    HTN (hypertension) 02/04/2020    DM2 (diabetes mellitus, type 2) ([facility] 02/04/2020     Formatting of this note might be different from the original.  controlled her HgA1C was 6.  10/21/19 RIGHT TKA 10/21/2019    Encounter for postoperative care 10/21/2019    Prerenal azotemia 10/21/2019    Primary osteoarthritis of left knee 01/02/2019    Idiopathic chronic gout of left foot without tophus 10/31/2017    Type 2 diabetes mellitus with complication, without long-term current use of insulin ([address] 08/07/2016    Depression 03/04/2015    Ventral hernia 06/20/2014    Morbid obesity ([facility] 06/20/2014    Macular degeneration 06/20/2014    Hypertension     Osteopenia     Hyperlipidemia     Squamous cell carcinoma of skin 11/16/2011       HPI   Patient is here today for follow-up on diabetes, depression, hypertension, hyperlipidemia, chronic renal disease and osteopenia. Patient has been doing well overall and has no specific concerns or questions today. She has not been taking Januvia within the last few months and he will continue with his he still stays very good at 5.3. Patient reports that home ambulatory blood sugars have been good as well. Patient denies chest pain, shortness of breath or myalgias. Denies headache change in vision or any neurologic symptoms. No GI or bowel concerns. Denies symptoms of depression or anxiety. Has been sleeping well. Patient has history of osteopenia and currently on calcium and vitamin D. Last 6 scan was several months ago. She is up-to-date with her mammograms.   No other concerns or questions today. Patient is currently followed by nephrology for chronic renal disease and has been stable on current management. He has also noticed a knot on palm of left hand at the base of left middle finger. There is no pain or difficulty with moving her finger. Past Medical History:   Diagnosis Date    CRD (chronic renal disease)     Depression     Herpes dermatitis     left thigh    Hyperlipidemia     Hypertension     Macular degeneration     Obesity     Osteopenia     Squamous acanthoma of face     Type II or unspecified type diabetes mellitus without mention of complication, not stated as uncontrolled      Social History     Socioeconomic History    Marital status:      Spouse name: Not on file    Number of children: Not on file    Years of education: Not on file    Highest education level: Not on file   Occupational History    Not on file   Tobacco Use    Smoking status: Never Smoker    Smokeless tobacco: Never Used   Vaping Use    Vaping Use: Never used   Substance and Sexual Activity    Alcohol use: No     Alcohol/week: 0.0 standard drinks    Drug use: No    Sexual activity: Not on file   Other Topics Concern    Not on file   Social History Narrative    Not on file     Social Determinants of Health     Financial Resource Strain: Low Risk     Difficulty of Paying Living Expenses: Not hard at all   Food Insecurity: No Food Insecurity    Worried About Running Out of Food in the Last Year: Never true    Ghassan of Food in the Last Year: Never true   Transportation Needs:     Lack of Transportation (Medical): Not on file    Lack of Transportation (Non-Medical):  Not on file   Physical Activity:     Days of Exercise per Week: Not on file    Minutes of Exercise per Session: Not on file   Stress:     Feeling of Stress : Not on file   Social Connections:     Frequency of Communication with Friends and Family: Not on file    Frequency of Social Gatherings with Friends and Family: Not on file    Attends Restorationist Services: Not on file    Active Member of Clubs or Organizations: Not on file    Attends Club or Organization Meetings: Not on file    Marital Status: Not on file   Intimate Partner Violence:     Fear of Current or Ex-Partner: Not on file    Emotionally Abused: Not on file    Physically Abused: Not on file    Sexually Abused: Not on file   Housing Stability:     Unable to Pay for Housing in the Last Year: Not on file    Number of [place] in the Last Year: Not on file    Unstable Housing in the Last Year: Not on file     Family History   Problem Relation Age of Onset    Heart Disease Mother     Alcohol Abuse Mother     Migraines Mother     Heart Disease Father     Stroke Father     Hypertension Father     Alcohol Abuse Other     Migraines Other        Review of Systems   Constitutional: Negative for activity change, appetite change and unexpected weight change. Respiratory: Negative for cough, chest tightness and shortness of breath. Cardiovascular: Negative for chest pain, palpitations and leg swelling. Gastrointestinal: Negative for abdominal pain, blood in stool, constipation and diarrhea. Musculoskeletal: Negative for arthralgias and myalgias. Skin: Negative for rash. Neurological: Negative for light-headedness and headaches. Hematological: Negative for adenopathy. Does not bruise/bleed easily. Psychiatric/Behavioral: Negative for dysphoric mood, sleep disturbance and suicidal ideas. The patient is not nervous/anxious. OBJECTIVE:  /62   Pulse (!) 49   Temp 97.1 °F (36.2 °C)   Wt 191 lb (86.6 kg)   SpO2 98%   BMI 30.83 kg/m²   Physical Exam  Vitals and nursing note reviewed. Constitutional:       Appearance: She is well-developed. Eyes:      Pupils: Pupils are equal, round, and reactive to light. Neck:      Thyroid: No thyromegaly. Vascular: No JVD.    Cardiovascular:      Rate and Rhythm: Normal rate and regular rhythm. Pulmonary:      Effort: Pulmonary effort is normal.      Breath sounds: Normal breath sounds. Abdominal:      General: Bowel sounds are normal.      Palpations: Abdomen is soft. Tenderness: There is no abdominal tenderness. Musculoskeletal:      Cervical back: Normal range of motion and neck supple. Comments: Left hand-there is mild calcification on the flexor tendons of right middle finger. No erythema or pain with palpation. Normal range of motion of the finger. Skin:     Findings: No rash. Neurological:      Mental Status: She is alert and oriented to person, place, and time. Psychiatric:         Behavior: Behavior normal.         Thought Content: Thought content normal.         ASSESSMENT/PLAN:    1. Type 2 diabetes mellitus with complication, without long-term current use of insulin ([address]  Continue with current management and refill meds. We will continue off Januvia  Reviewed recent labs  Encouraged appropriate diet  - POCT Glucose  - POCT Urinalysis no Micro    2. Depression, unspecified depression type  Stress  management. Continue refill Zoloft    3. Essential hypertension  Refill meds  Ambulatory blood pressure checks    4. Hyperlipidemia, unspecified hyperlipidemia type  Reviewed recent labs. Continue and refill Pravachol    5. Chronic kidney disease, unspecified CKD stage  Keep follow-up with nephrology    6. Osteopenia, unspecified location  Reviewed recent DEXA scan. Continue with current management    7. Dupuytren's    Patient has very small area of calcifications in the flexor tendon of the middle finger. She has no trouble with that range of motion. No pain.   We did discuss observation for now and will reevaluate if the area becomes uncomfortable or range of motion fingers affected or others redness  Orders Placed This Encounter   Procedures    POCT Glucose    POCT Urinalysis no Micro     Current Outpatient Medications   Medication Sig Dispense Refill    lisinopril (PRINIVIL;ZESTRIL) 10 MG tablet TAKE ONE TABLET BY MOUTH DAILY 90 tablet 0    pravastatin (PRAVACHOL) 40 MG tablet TAKE ONE TABLET BY MOUTH DAILY 90 tablet 0    bisoprolol-hydroCHLOROthiazide (ZIAC) 10-6.25 MG per tablet TAKE ONE TABLET BY MOUTH DAILY 90 tablet 0    sertraline (ZOLOFT) 100 MG tablet daily 90 tablet 0    aspirin 325 MG EC tablet Take 1 tablet by mouth 2 times daily Take  mg. Twice daily x 14 days; then resume your normal home dose of ASA 28 tablet 3    bevacizumab (AVASTIN) 100 MG/4ML chemo injection inject 0.05mL intraocularly in affected eye.  acetaminophen (TYLENOL) 500 MG tablet Take 1,000 mg by mouth 2 times daily (Patient not taking: Reported on 5/11/2022)      indomethacin (INDOCIN) 50 MG capsule Take 1 capsule by mouth 2 times daily (with meals) (Patient not taking: Reported on 5/11/2022) 20 capsule 0    Handicap Placard MISC by Does not apply route Osteoarthritis, knee pain  1/2019-1/24 (Patient not taking: Reported on 5/11/2022) 1 each 0    Misc. Devices (WALKER) MISC 1 each by Does not apply route daily. Dispense and Fit for injury to left lower extremity and weakness. (Patient not taking: Reported on 5/11/2022) 1 each 0     No current facility-administered medications for this visit. Return in about 3 months (around 8/11/2022), or if symptoms worsen or fail to improve, for diabetes, .     Estelle Dodd MD, MD

## 2022-05-11 NOTE — PATIENT INSTRUCTIONS
Patient Education        Dupuytren's Contracture: Care Instructions  Your Care Instructions     In Dupuytren's contracture, the fingers become stiff and curl toward the palm. It is caused by thick tissue that grows under the skin in the palm of the hand. Sometimes the condition affects the palm but not the fingers. If the tissue gets thicker and affects one or more fingers, it may limit movement of yourfingers and hand. Sometimes the condition can occur in the soles of the feet. The cause of Dupuytren's disease is not known. Dupuytren's disease may get worse slowly. If you have mild Dupuytren's disease, you may be able to keep your fingers moving with regular stretching. Surgery usually helps in severecases. However, Dupuytren's disease can come back. Follow-up care is a key part of your treatment and safety. Be sure to make and go to all appointments, and call your doctor if you are having problems. It's also a good idea to know your test results and keep alist of the medicines you take. How can you care for yourself at home?  Follow your doctor's advice for physical or occupational therapy and exercises to put your fingers and hand through a range of motion.  Two times a day, massage your hand and gently stretch the fingers back. This can get rid of tightness and help keep your fingers flexible.  Try to avoid curling your hand tightly. For example, use utensils and tools that have larger hand . When should you call for help? Call your doctor now or seek immediate medical care if:     You have numbness in your fingers.      You have a wound or sore on your finger or palm.      Your hand or fingers get worse. Watch closely for changes in your health, and be sure to contact your doctor ifyou have any problems. Where can you learn more? Go to https://Jet[employer]parmjit."[employer]. org and sign in to your [employer] account.  Enter T888 in the [employer] box to learn more about \"Dupuytren's Contracture: Care Instructions. \"     If you do not have an account, please click on the \"Sign Up Now\" link. Current as of: July 1, 2021               Content Version: 13.2  © 4957-9763 [employer]. Care instructions adapted under license by [facility] ([facility]). If you have questions about a medical condition or this instruction, always ask your healthcare professional. [address] any warranty or liability for your use of this information.

## 2022-07-18 RX ORDER — SERTRALINE HYDROCHLORIDE 100 MG/1
TABLET, FILM COATED ORAL
Qty: 90 TABLET | Refills: 0 | OUTPATIENT
Start: 2022-07-18

## 2022-08-05 DIAGNOSIS — E11.8 TYPE 2 DIABETES MELLITUS WITH COMPLICATION, WITHOUT LONG-TERM CURRENT USE OF INSULIN (HCC): ICD-10-CM

## 2022-08-05 DIAGNOSIS — E78.5 HYPERLIPIDEMIA, UNSPECIFIED HYPERLIPIDEMIA TYPE: ICD-10-CM

## 2022-08-05 LAB
A/G RATIO: 1.6 (ref 1.1–2.2)
ALBUMIN SERPL-MCNC: 4 G/DL (ref 3.4–5)
ALP BLD-CCNC: 103 U/L (ref 40–129)
ALT SERPL-CCNC: 9 U/L (ref 10–40)
ANION GAP SERPL CALCULATED.3IONS-SCNC: 14 MMOL/L (ref 3–16)
AST SERPL-CCNC: 15 U/L (ref 15–37)
BASOPHILS ABSOLUTE: 0.1 K/UL (ref 0–0.2)
BASOPHILS RELATIVE PERCENT: 0.6 %
BILIRUB SERPL-MCNC: 0.5 MG/DL (ref 0–1)
BUN BLDV-MCNC: 25 MG/DL (ref 7–20)
CALCIUM SERPL-MCNC: 9.4 MG/DL (ref 8.3–10.6)
CHLORIDE BLD-SCNC: 98 MMOL/L (ref 99–110)
CHOLESTEROL, TOTAL: 159 MG/DL (ref 0–199)
CO2: 24 MMOL/L (ref 21–32)
CREAT SERPL-MCNC: 1.2 MG/DL (ref 0.6–1.2)
EOSINOPHILS ABSOLUTE: 0.2 K/UL (ref 0–0.6)
EOSINOPHILS RELATIVE PERCENT: 2 %
GFR AFRICAN AMERICAN: 53
GFR NON-AFRICAN AMERICAN: 43
GLUCOSE BLD-MCNC: 121 MG/DL (ref 70–99)
HCT VFR BLD CALC: 36.8 % (ref 36–48)
HDLC SERPL-MCNC: 45 MG/DL (ref 40–60)
HEMOGLOBIN: 12.6 G/DL (ref 12–16)
LDL CHOLESTEROL CALCULATED: 71 MG/DL
LYMPHOCYTES ABSOLUTE: 1.1 K/UL (ref 1–5.1)
LYMPHOCYTES RELATIVE PERCENT: 12.2 %
MCH RBC QN AUTO: 31.7 PG (ref 26–34)
MCHC RBC AUTO-ENTMCNC: 34.4 G/DL (ref 31–36)
MCV RBC AUTO: 92.2 FL (ref 80–100)
MONOCYTES ABSOLUTE: 0.3 K/UL (ref 0–1.3)
MONOCYTES RELATIVE PERCENT: 3.8 %
NEUTROPHILS ABSOLUTE: 7.4 K/UL (ref 1.7–7.7)
NEUTROPHILS RELATIVE PERCENT: 81.4 %
PDW BLD-RTO: 13.7 % (ref 12.4–15.4)
PLATELET # BLD: 253 K/UL (ref 135–450)
PMV BLD AUTO: 7.4 FL (ref 5–10.5)
POTASSIUM SERPL-SCNC: 4.7 MMOL/L (ref 3.5–5.1)
RBC # BLD: 3.99 M/UL (ref 4–5.2)
SODIUM BLD-SCNC: 136 MMOL/L (ref 136–145)
TOTAL PROTEIN: 6.5 G/DL (ref 6.4–8.2)
TRIGL SERPL-MCNC: 213 MG/DL (ref 0–150)
VLDLC SERPL CALC-MCNC: 43 MG/DL
WBC # BLD: 9.1 K/UL (ref 4–11)

## 2022-08-06 LAB
ESTIMATED AVERAGE GLUCOSE: 108.3 MG/DL
HBA1C MFR BLD: 5.4 %

## 2022-08-10 ENCOUNTER — OFFICE VISIT (OUTPATIENT)
Dept: FAMILY MEDICINE CLINIC | Age: 77
End: 2022-08-10
Payer: MEDICARE

## 2022-08-10 VITALS
SYSTOLIC BLOOD PRESSURE: 128 MMHG | DIASTOLIC BLOOD PRESSURE: 78 MMHG | RESPIRATION RATE: 18 BRPM | WEIGHT: 194.2 LBS | BODY MASS INDEX: 31.21 KG/M2 | OXYGEN SATURATION: 98 % | TEMPERATURE: 97.4 F | HEART RATE: 53 BPM | HEIGHT: 66 IN

## 2022-08-10 DIAGNOSIS — F32.A DEPRESSION, UNSPECIFIED DEPRESSION TYPE: ICD-10-CM

## 2022-08-10 DIAGNOSIS — E78.5 HYPERLIPIDEMIA, UNSPECIFIED HYPERLIPIDEMIA TYPE: ICD-10-CM

## 2022-08-10 DIAGNOSIS — I10 ESSENTIAL HYPERTENSION: ICD-10-CM

## 2022-08-10 DIAGNOSIS — E11.22 TYPE 2 DIABETES MELLITUS WITH CHRONIC KIDNEY DISEASE, WITHOUT LONG-TERM CURRENT USE OF INSULIN, UNSPECIFIED CKD STAGE (HCC): ICD-10-CM

## 2022-08-10 DIAGNOSIS — E11.8 TYPE 2 DIABETES MELLITUS WITH COMPLICATION, WITHOUT LONG-TERM CURRENT USE OF INSULIN (HCC): Primary | ICD-10-CM

## 2022-08-10 LAB
BILIRUBIN, POC: NORMAL
BLOOD URINE, POC: NORMAL
CHP ED QC CHECK: NORMAL
CLARITY, POC: CLEAR
COLOR, POC: YELLOW
GLUCOSE BLD-MCNC: 99 MG/DL
GLUCOSE URINE, POC: NORMAL
KETONES, POC: NORMAL
LEUKOCYTE EST, POC: NORMAL
NITRITE, POC: NORMAL
PH, POC: 5.5
PROTEIN, POC: NORMAL
SPECIFIC GRAVITY, POC: 1.02
UROBILINOGEN, POC: 0.2

## 2022-08-10 PROCEDURE — 3044F HG A1C LEVEL LT 7.0%: CPT | Performed by: FAMILY MEDICINE

## 2022-08-10 PROCEDURE — 1123F ACP DISCUSS/DSCN MKR DOCD: CPT | Performed by: FAMILY MEDICINE

## 2022-08-10 PROCEDURE — 99214 OFFICE O/P EST MOD 30 MIN: CPT | Performed by: FAMILY MEDICINE

## 2022-08-10 PROCEDURE — 82962 GLUCOSE BLOOD TEST: CPT | Performed by: FAMILY MEDICINE

## 2022-08-10 PROCEDURE — 81002 URINALYSIS NONAUTO W/O SCOPE: CPT | Performed by: FAMILY MEDICINE

## 2022-08-10 RX ORDER — PRAVASTATIN SODIUM 40 MG
TABLET ORAL
Qty: 90 TABLET | Refills: 0 | OUTPATIENT
Start: 2022-08-10

## 2022-08-10 RX ORDER — BISOPROLOL FUMARATE AND HYDROCHLOROTHIAZIDE 10; 6.25 MG/1; MG/1
TABLET ORAL
Qty: 90 TABLET | Refills: 0 | OUTPATIENT
Start: 2022-08-10

## 2022-08-10 RX ORDER — SERTRALINE HYDROCHLORIDE 100 MG/1
TABLET, FILM COATED ORAL
Qty: 90 TABLET | Refills: 0 | Status: SHIPPED | OUTPATIENT
Start: 2022-08-10

## 2022-08-10 RX ORDER — SERTRALINE HYDROCHLORIDE 100 MG/1
TABLET, FILM COATED ORAL
Qty: 90 TABLET | Refills: 0 | OUTPATIENT
Start: 2022-08-10

## 2022-08-10 RX ORDER — PRAVASTATIN SODIUM 40 MG
TABLET ORAL
Qty: 90 TABLET | Refills: 0 | Status: SHIPPED | OUTPATIENT
Start: 2022-08-10

## 2022-08-10 RX ORDER — LISINOPRIL 10 MG/1
TABLET ORAL
Qty: 90 TABLET | Refills: 0 | OUTPATIENT
Start: 2022-08-10

## 2022-08-10 RX ORDER — BISOPROLOL FUMARATE AND HYDROCHLOROTHIAZIDE 10; 6.25 MG/1; MG/1
TABLET ORAL
Qty: 90 TABLET | Refills: 0 | Status: SHIPPED | OUTPATIENT
Start: 2022-08-10

## 2022-08-10 RX ORDER — LISINOPRIL 10 MG/1
TABLET ORAL
Qty: 90 TABLET | Refills: 0 | Status: SHIPPED | OUTPATIENT
Start: 2022-08-10

## 2022-08-10 ASSESSMENT — ENCOUNTER SYMPTOMS
DIARRHEA: 0
COUGH: 0
SHORTNESS OF BREATH: 0
ABDOMINAL PAIN: 0
BLOOD IN STOOL: 0
CONSTIPATION: 0
CHEST TIGHTNESS: 0

## 2022-08-10 NOTE — PROGRESS NOTES
SUBJECTIVE:  Sheridan Cali   1945   female   No Known Allergies    Chief Complaint   Patient presents with    Diabetes    3 Month Follow-Up        Patient Active Problem List    Diagnosis Date Noted    CKD (chronic kidney disease), stage III ([facility] 02/04/2020    Gout 02/04/2020     Formatting of this note might be different from the original.  off allopurinol      Vitamin D deficiency 02/04/2020    HTN (hypertension) 02/04/2020    DM2 (diabetes mellitus, type 2) ([facility] [address] 02/04/2020     Formatting of this note might be different from the original.  controlled her HgA1C was 6.      10/21/19 RIGHT TKA 10/21/2019    Encounter for postoperative care 10/21/2019    Prerenal azotemia 10/21/2019    Primary osteoarthritis of left knee 01/02/2019    Idiopathic chronic gout of left foot without tophus 10/31/2017    Type 2 diabetes mellitus with complication, without long-term current use of insulin ([facility] [address] 08/07/2016    Depression 03/04/2015    Ventral hernia 06/20/2014    Morbid obesity ([facility] [address] 06/20/2014    Macular degeneration 06/20/2014    Hypertension     Osteopenia     Hyperlipidemia     Squamous cell carcinoma of skin 11/16/2011       HPI   Patient is here today for follow-up on diabetes, hyperlipidemia, chronic renal disease, depression, hypertension. Patient reports overall she has been stable on current treatment management. She continues close follow-up with nephrology and has been stable regarding renal functions. He has been doing well on Zoloft and denies symptoms of depression or anxiety. Has been sleeping well. Ambulatory blood sugars have been good. No episodes of hypoglycemia. Last hemoglobin A1c is at 5.4. Cholesterol labs are good with LDL at 71. He has been trying to adhere to a better diet and staying fairly active. She still lives at home and takes care of her own house. Patient also reports noticing a bout of diarrhea once in a while.   Reports maybe once a week or so she has some loose stools which is new for her. She has not been on any antibiotics has not been traveling recently. She does not think she has had any changes in her diet. There is no blood in the stool or dark tarry stools. No abdominal pain nausea vomiting or weight changes or change in appetite.   Past Medical History:   Diagnosis Date    CRD (chronic renal disease)     Depression     Herpes dermatitis     left thigh    Hyperlipidemia     Hypertension     Macular degeneration     Obesity     Osteopenia     Squamous acanthoma of face     Type II or unspecified type diabetes mellitus without mention of complication, not stated as uncontrolled      Social History     Socioeconomic History    Marital status:      Spouse name: Not on file    Number of children: Not on file    Years of education: Not on file    Highest education level: Not on file   Occupational History    Not on file   Tobacco Use    Smoking status: Never    Smokeless tobacco: Never   Vaping Use    Vaping Use: Never used   Substance and Sexual Activity    Alcohol use: No     Alcohol/week: 0.0 standard drinks    Drug use: No    Sexual activity: Not on file   Other Topics Concern    Not on file   Social History Narrative    Not on file     Social Determinants of Health     Financial Resource Strain: Low Risk     Difficulty of Paying Living Expenses: Not hard at all   Food Insecurity: No Food Insecurity    Worried About Running Out of Food in the Last Year: Never true    Ran Out of Food in the Last Year: Never true   Transportation Needs: Not on file   Physical Activity: Not on file   Stress: Not on file   Social Connections: Not on file   Intimate Partner Violence: Not on file   Housing Stability: Not on file     Family History   Problem Relation Age of Onset    Heart Disease Mother     Alcohol Abuse Mother     Migraines Mother     Heart Disease Father     Stroke Father     Hypertension Father     Alcohol Abuse Other     Migraines Other        Review of Systems Constitutional:  Negative for activity change, appetite change and unexpected weight change. Respiratory:  Negative for cough, chest tightness and shortness of breath. Cardiovascular:  Negative for chest pain, palpitations and leg swelling. Gastrointestinal:  Negative for abdominal pain, blood in stool, constipation and diarrhea. Musculoskeletal:  Negative for arthralgias and myalgias. Skin:  Negative for rash. Neurological:  Negative for light-headedness and headaches. Hematological:  Negative for adenopathy. Does not bruise/bleed easily. Psychiatric/Behavioral:  Negative for dysphoric mood, sleep disturbance and suicidal ideas. The patient is not nervous/anxious. OBJECTIVE:  /78   Pulse 53   Temp 97.4 °F (36.3 °C)   Resp 18   Ht 5' 6\" (1.676 m)   Wt 194 lb 3.2 oz (88.1 kg)   SpO2 98%   BMI 31.34 kg/m²   Physical Exam  Vitals and nursing note reviewed. Constitutional:       Appearance: She is well-developed. Eyes:      Pupils: Pupils are equal, round, and reactive to light. Neck:      Thyroid: No thyromegaly. Vascular: No JVD. Cardiovascular:      Rate and Rhythm: Normal rate and regular rhythm. Pulmonary:      Effort: Pulmonary effort is normal.      Breath sounds: Normal breath sounds. Abdominal:      General: Bowel sounds are normal. There is no distension. Palpations: Abdomen is soft. Tenderness: There is no abdominal tenderness. There is no guarding. Musculoskeletal:      Cervical back: Normal range of motion and neck supple. Skin:     Findings: No rash. Neurological:      Mental Status: She is alert and oriented to person, place, and time. Cranial Nerves: No cranial nerve deficit. Psychiatric:         Behavior: Behavior normal.         Thought Content: Thought content normal.       ASSESSMENT/PLAN:    1.  Type 2 diabetes mellitus with complication, without long-term current use of insulin ([address]  Continue with current management and refill meds  Reviewed recent labs  Continue ambulatory blood sugar checks  Encouraged appropriate diet and some increase in physical activity as tolerated  - POCT Urinalysis no Micro  - POCT Glucose    2. Hyperlipidemia, unspecified hyperlipidemia type  Reviewed recent labs  Continue with current management and refill meds    3. Type 2 diabetes mellitus with chronic kidney disease, without long-term current use of insulin, unspecified CKD stage ([facility] [address]  Continue follow-up with nephrology. Reviewed last nephrology note and recommendations    4. Depression, unspecified depression type  Stress management. Continue and refill Zoloft    5. Essential hypertension  Refill meds  Ambulatory blood pressure checks  DASH diet reminder    6. Intermittent loose stools/diarrhea  Patient was advised to try a probiotic as well as daily fiber like Metamucil    We also discussed appropriate diet and trying to avoid processed foods and fatty foods    Will reevaluate if symptoms do not resolve completely. Orders Placed This Encounter   Procedures    POCT Urinalysis no Micro    POCT Glucose     Current Outpatient Medications   Medication Sig Dispense Refill    lisinopril (PRINIVIL;ZESTRIL) 10 MG tablet TAKE ONE TABLET BY MOUTH DAILY 90 tablet 0    pravastatin (PRAVACHOL) 40 MG tablet TAKE ONE TABLET BY MOUTH DAILY 90 tablet 0    bisoprolol-hydroCHLOROthiazide (ZIAC) 10-6.25 MG per tablet TAKE ONE TABLET BY MOUTH DAILY 90 tablet 0    sertraline (ZOLOFT) 100 MG tablet daily 90 tablet 0    aspirin 325 MG EC tablet Take 1 tablet by mouth 2 times daily Take  mg.  Twice daily x 14 days; then resume your normal home dose of ASA 28 tablet 3    acetaminophen (TYLENOL) 500 MG tablet Take 1,000 mg by mouth 2 times daily (Patient not taking: Reported on 5/11/2022)      indomethacin (INDOCIN) 50 MG capsule Take 1 capsule by mouth 2 times daily (with meals) (Patient not taking: Reported on 5/11/2022) 20 capsule 0    bevacizumab (AVASTIN) 100

## 2022-09-15 DIAGNOSIS — E11.8 TYPE 2 DIABETES MELLITUS WITH COMPLICATION, WITHOUT LONG-TERM CURRENT USE OF INSULIN (HCC): ICD-10-CM

## 2022-09-15 DIAGNOSIS — E78.5 HYPERLIPIDEMIA, UNSPECIFIED HYPERLIPIDEMIA TYPE: ICD-10-CM

## 2022-09-16 LAB
A/G RATIO: 1.8 (ref 1.1–2.2)
ALBUMIN SERPL-MCNC: 4.4 G/DL (ref 3.4–5)
ALP BLD-CCNC: 134 U/L (ref 40–129)
ALT SERPL-CCNC: 12 U/L (ref 10–40)
ANION GAP SERPL CALCULATED.3IONS-SCNC: 19 MMOL/L (ref 3–16)
AST SERPL-CCNC: 17 U/L (ref 15–37)
BASOPHILS ABSOLUTE: 0.1 K/UL (ref 0–0.2)
BASOPHILS RELATIVE PERCENT: 1 %
BILIRUB SERPL-MCNC: 0.3 MG/DL (ref 0–1)
BUN BLDV-MCNC: 34 MG/DL (ref 7–20)
CALCIUM SERPL-MCNC: 9.6 MG/DL (ref 8.3–10.6)
CHLORIDE BLD-SCNC: 102 MMOL/L (ref 99–110)
CHOLESTEROL, TOTAL: 219 MG/DL (ref 0–199)
CO2: 19 MMOL/L (ref 21–32)
CREAT SERPL-MCNC: 1.3 MG/DL (ref 0.6–1.2)
EOSINOPHILS ABSOLUTE: 0.2 K/UL (ref 0–0.6)
EOSINOPHILS RELATIVE PERCENT: 1.9 %
ESTIMATED AVERAGE GLUCOSE: 105.4 MG/DL
GFR AFRICAN AMERICAN: 48
GFR NON-AFRICAN AMERICAN: 40
GLUCOSE BLD-MCNC: 116 MG/DL (ref 70–99)
HBA1C MFR BLD: 5.3 %
HCT VFR BLD CALC: 38.2 % (ref 36–48)
HDLC SERPL-MCNC: 50 MG/DL (ref 40–60)
HEMOGLOBIN: 12.8 G/DL (ref 12–16)
LDL CHOLESTEROL CALCULATED: 110 MG/DL
LYMPHOCYTES ABSOLUTE: 1.5 K/UL (ref 1–5.1)
LYMPHOCYTES RELATIVE PERCENT: 16.3 %
MCH RBC QN AUTO: 31.7 PG (ref 26–34)
MCHC RBC AUTO-ENTMCNC: 33.5 G/DL (ref 31–36)
MCV RBC AUTO: 94.4 FL (ref 80–100)
MONOCYTES ABSOLUTE: 0.4 K/UL (ref 0–1.3)
MONOCYTES RELATIVE PERCENT: 4.7 %
NEUTROPHILS ABSOLUTE: 7 K/UL (ref 1.7–7.7)
NEUTROPHILS RELATIVE PERCENT: 76.1 %
PDW BLD-RTO: 14.6 % (ref 12.4–15.4)
PLATELET # BLD: 264 K/UL (ref 135–450)
PMV BLD AUTO: 7.7 FL (ref 5–10.5)
POTASSIUM SERPL-SCNC: 4.6 MMOL/L (ref 3.5–5.1)
RBC # BLD: 4.04 M/UL (ref 4–5.2)
SODIUM BLD-SCNC: 140 MMOL/L (ref 136–145)
TOTAL PROTEIN: 6.8 G/DL (ref 6.4–8.2)
TRIGL SERPL-MCNC: 295 MG/DL (ref 0–150)
VLDLC SERPL CALC-MCNC: 59 MG/DL
WBC # BLD: 9.2 K/UL (ref 4–11)

## 2022-09-22 RX ORDER — BISOPROLOL FUMARATE AND HYDROCHLOROTHIAZIDE 10; 6.25 MG/1; MG/1
TABLET ORAL
Qty: 90 TABLET | Refills: 0 | OUTPATIENT
Start: 2022-09-22

## 2022-09-22 RX ORDER — LISINOPRIL 10 MG/1
TABLET ORAL
Qty: 90 TABLET | Refills: 0 | OUTPATIENT
Start: 2022-09-22

## 2022-09-22 RX ORDER — PRAVASTATIN SODIUM 40 MG
TABLET ORAL
Qty: 90 TABLET | Refills: 0 | OUTPATIENT
Start: 2022-09-22

## 2022-09-23 RX ORDER — BISOPROLOL FUMARATE AND HYDROCHLOROTHIAZIDE 10; 6.25 MG/1; MG/1
TABLET ORAL
Qty: 60 TABLET | Refills: 0 | OUTPATIENT
Start: 2022-09-23

## 2022-09-23 RX ORDER — PRAVASTATIN SODIUM 40 MG
TABLET ORAL
Qty: 60 TABLET | Refills: 0 | OUTPATIENT
Start: 2022-09-23

## 2022-09-23 RX ORDER — LISINOPRIL 10 MG/1
TABLET ORAL
Qty: 60 TABLET | Refills: 0 | OUTPATIENT
Start: 2022-09-23

## 2022-09-23 RX ORDER — SERTRALINE HYDROCHLORIDE 100 MG/1
TABLET, FILM COATED ORAL
Qty: 60 TABLET | Refills: 0 | OUTPATIENT
Start: 2022-09-23

## 2022-09-23 NOTE — TELEPHONE ENCOUNTER
These prescriptions was sent to [facility] at her August appointment for number 90 days? She should not be due for a refill yet. Please check the chart before sending these messages back.

## 2022-09-23 NOTE — TELEPHONE ENCOUNTER
Informed pt she is not due for a RX just yet she did advise me that she lost her rx that's why she called in but she did find them and does not need a refill

## 2022-09-23 NOTE — TELEPHONE ENCOUNTER
Patient requesting a medication refill.   pravastatin (PRAVACHOL) 40 MG tablet 90 tablet 0 8/10/2022     Sig: TAKE ONE TABLET BY MOUTH DAILY      bisoprolol-hydroCHLOROthiazide (ZIAC) 10-6.25 MG per tablet 90 tablet 0 8/10/2022     Sig: TAKE ONE TABLET BY MOUTH DAILY      sertraline (ZOLOFT) 100 MG tablet 90 tablet 0 8/10/2022     Sig: daily      lisinopril (PRINIVIL;ZESTRIL) 10 MG tablet 90 tablet 0 8/10/2022     Sig: TAKE ONE TABLET BY MOUTH DAILY    [place]   Last OV 8/10/22  Next OV 11/11/22

## 2022-11-10 RX ORDER — SERTRALINE HYDROCHLORIDE 100 MG/1
TABLET, FILM COATED ORAL
Qty: 90 TABLET | Refills: 0 | OUTPATIENT
Start: 2022-11-10

## 2022-11-11 ENCOUNTER — OFFICE VISIT (OUTPATIENT)
Dept: FAMILY MEDICINE CLINIC | Age: 77
End: 2022-11-11
Payer: MEDICARE

## 2022-11-11 VITALS
HEART RATE: 50 BPM | OXYGEN SATURATION: 98 % | TEMPERATURE: 97.1 F | HEIGHT: 66 IN | SYSTOLIC BLOOD PRESSURE: 132 MMHG | BODY MASS INDEX: 31.12 KG/M2 | WEIGHT: 193.6 LBS | RESPIRATION RATE: 18 BRPM | DIASTOLIC BLOOD PRESSURE: 80 MMHG

## 2022-11-11 DIAGNOSIS — F33.42 RECURRENT MAJOR DEPRESSIVE DISORDER, IN FULL REMISSION (HCC): ICD-10-CM

## 2022-11-11 DIAGNOSIS — M85.80 OSTEOPENIA, UNSPECIFIED LOCATION: ICD-10-CM

## 2022-11-11 DIAGNOSIS — N18.30 STAGE 3 CHRONIC KIDNEY DISEASE, UNSPECIFIED WHETHER STAGE 3A OR 3B CKD (HCC): ICD-10-CM

## 2022-11-11 DIAGNOSIS — R19.7 DIARRHEA, UNSPECIFIED TYPE: ICD-10-CM

## 2022-11-11 DIAGNOSIS — F33.0 MAJOR DEPRESSIVE DISORDER, RECURRENT, MILD (HCC): ICD-10-CM

## 2022-11-11 DIAGNOSIS — E78.1 PURE HYPERTRIGLYCERIDEMIA: ICD-10-CM

## 2022-11-11 DIAGNOSIS — E11.8 TYPE 2 DIABETES MELLITUS WITH COMPLICATION, WITHOUT LONG-TERM CURRENT USE OF INSULIN (HCC): Primary | ICD-10-CM

## 2022-11-11 DIAGNOSIS — I10 ESSENTIAL HYPERTENSION: ICD-10-CM

## 2022-11-11 DIAGNOSIS — Z23 NEED FOR VACCINATION: ICD-10-CM

## 2022-11-11 PROBLEM — F33.1 MAJOR DEPRESSIVE DISORDER, RECURRENT, MODERATE (HCC): Status: ACTIVE | Noted: 2022-11-11

## 2022-11-11 PROBLEM — F33.9 MAJOR DEPRESSIVE DISORDER, RECURRENT, UNSPECIFIED (HCC): Status: ACTIVE | Noted: 2022-11-11

## 2022-11-11 LAB
BILIRUBIN, POC: NORMAL
BLOOD URINE, POC: NORMAL
CHP ED QC CHECK: NORMAL
CLARITY, POC: NORMAL
COLOR, POC: YELLOW
GLUCOSE BLD-MCNC: 99 MG/DL
GLUCOSE URINE, POC: NORMAL
KETONES, POC: NORMAL
LEUKOCYTE EST, POC: NORMAL
NITRITE, POC: NORMAL
PH, POC: 5.5
PROTEIN, POC: NORMAL
SPECIFIC GRAVITY, POC: 1.02
UROBILINOGEN, POC: NORMAL

## 2022-11-11 PROCEDURE — 99214 OFFICE O/P EST MOD 30 MIN: CPT | Performed by: FAMILY MEDICINE

## 2022-11-11 PROCEDURE — 90694 VACC AIIV4 NO PRSRV 0.5ML IM: CPT | Performed by: FAMILY MEDICINE

## 2022-11-11 PROCEDURE — 81002 URINALYSIS NONAUTO W/O SCOPE: CPT | Performed by: FAMILY MEDICINE

## 2022-11-11 PROCEDURE — G8399 PT W/DXA RESULTS DOCUMENT: HCPCS | Performed by: FAMILY MEDICINE

## 2022-11-11 PROCEDURE — 1123F ACP DISCUSS/DSCN MKR DOCD: CPT | Performed by: FAMILY MEDICINE

## 2022-11-11 PROCEDURE — G8484 FLU IMMUNIZE NO ADMIN: HCPCS | Performed by: FAMILY MEDICINE

## 2022-11-11 PROCEDURE — 82962 GLUCOSE BLOOD TEST: CPT | Performed by: FAMILY MEDICINE

## 2022-11-11 PROCEDURE — 3074F SYST BP LT 130 MM HG: CPT | Performed by: FAMILY MEDICINE

## 2022-11-11 PROCEDURE — G0008 ADMIN INFLUENZA VIRUS VAC: HCPCS | Performed by: FAMILY MEDICINE

## 2022-11-11 PROCEDURE — G8427 DOCREV CUR MEDS BY ELIG CLIN: HCPCS | Performed by: FAMILY MEDICINE

## 2022-11-11 PROCEDURE — 1036F TOBACCO NON-USER: CPT | Performed by: FAMILY MEDICINE

## 2022-11-11 PROCEDURE — G8417 CALC BMI ABV UP PARAM F/U: HCPCS | Performed by: FAMILY MEDICINE

## 2022-11-11 PROCEDURE — 3078F DIAST BP <80 MM HG: CPT | Performed by: FAMILY MEDICINE

## 2022-11-11 PROCEDURE — 1090F PRES/ABSN URINE INCON ASSESS: CPT | Performed by: FAMILY MEDICINE

## 2022-11-11 PROCEDURE — 3044F HG A1C LEVEL LT 7.0%: CPT | Performed by: FAMILY MEDICINE

## 2022-11-11 RX ORDER — SERTRALINE HYDROCHLORIDE 100 MG/1
TABLET, FILM COATED ORAL
Qty: 90 TABLET | Refills: 0 | Status: SHIPPED | OUTPATIENT
Start: 2022-11-11

## 2022-11-11 RX ORDER — BISOPROLOL FUMARATE AND HYDROCHLOROTHIAZIDE 10; 6.25 MG/1; MG/1
TABLET ORAL
Qty: 90 TABLET | Refills: 0 | Status: SHIPPED | OUTPATIENT
Start: 2022-11-11

## 2022-11-11 RX ORDER — PRAVASTATIN SODIUM 40 MG
TABLET ORAL
Qty: 90 TABLET | Refills: 0 | Status: SHIPPED | OUTPATIENT
Start: 2022-11-11

## 2022-11-11 RX ORDER — LISINOPRIL 10 MG/1
TABLET ORAL
Qty: 90 TABLET | Refills: 0 | Status: SHIPPED | OUTPATIENT
Start: 2022-11-11

## 2022-11-11 ASSESSMENT — ENCOUNTER SYMPTOMS
CONSTIPATION: 0
COUGH: 0
DIARRHEA: 1
BLOOD IN STOOL: 0
NAUSEA: 0
VOMITING: 0
CHEST TIGHTNESS: 0
SHORTNESS OF BREATH: 0
ABDOMINAL PAIN: 0

## 2022-11-11 NOTE — PROGRESS NOTES
SUBJECTIVE:  Rand Faster   1945   female   No Known Allergies    Chief Complaint   Patient presents with    3 Month Follow-Up        Patient Active Problem List    Diagnosis Date Noted    Major depressive disorder, recurrent, mild 11/11/2022     Priority: Medium    Major depressive disorder, recurrent, moderate 11/11/2022     Priority: Medium    Major depressive disorder, recurrent, unspecified 11/11/2022     Priority: Medium    CKD (chronic kidney disease), stage III ([facility] [address] 02/04/2020    Gout 02/04/2020     Formatting of this note might be different from the original.  off allopurinol      Vitamin D deficiency 02/04/2020    HTN (hypertension) 02/04/2020    DM2 (diabetes mellitus, type 2) ([facility] [address] 02/04/2020     Formatting of this note might be different from the original.  controlled her HgA1C was 6.      10/21/19 RIGHT TKA 10/21/2019    Encounter for postoperative care 10/21/2019    Prerenal azotemia 10/21/2019    Primary osteoarthritis of left knee 01/02/2019    Idiopathic chronic gout of left foot without tophus 10/31/2017    Type 2 diabetes mellitus with complication, without long-term current use of insulin ([address] 08/07/2016    Depression 03/04/2015    Ventral hernia 06/20/2014    Morbid obesity ([address] 06/20/2014    Macular degeneration 06/20/2014    Hypertension     Osteopenia     Hyperlipidemia     Squamous cell carcinoma of skin 11/16/2011       HPI   Patient returns today for follow-up on diabetes, depression, CRD, hypertension, osteopenia and complaining of intermittent diarrhea. Patient reports every few days she notices she has very loose and frequent stools. Reports this has been going on for few months. She was advised to take some fiber which she has been taking fairly regularly but it has not made any difference in her symptoms. Patient denies any nausea or vomiting or change in appetite or weight. No blood in the stool or dark tarry stools.   She has not identified any specific foods that may cause diarrhea. No abdominal pain or cramping. She does not recall when her last colon cancer screening was. No recent travel or change in medications. Patient has been doing well on current management for diabetes and hypertension hyperlipidemia. Denies chest pain, shortness of breath or myalgias. Denies headache change in vision or any neurologic symptoms. Denies depression or anxiety. Sleeping well. Last DEXA does show osteopenia. She has been working on taking dietary calcium and increase vitamin D. She is also followed by nephrology for chronic renal disease has been stable on current management. No other concerns or questions today. Last hemoglobin A1c was good at 5.3. She does not check her blood sugars at home but no episodes of hypoglycemia.   Last cholesterol labs were slightly elevated from previous labs with total cholesterol 219 triglycerides up to 295 and LDL of 110 (from 71)  Past Medical History:   Diagnosis Date    CRD (chronic renal disease)     Depression     Herpes dermatitis     left thigh    Hyperlipidemia     Hypertension     Macular degeneration     Obesity     Osteopenia     Squamous acanthoma of face     Type II or unspecified type diabetes mellitus without mention of complication, not stated as uncontrolled      Social History     Socioeconomic History    Marital status:      Spouse name: Not on file    Number of children: Not on file    Years of education: Not on file    Highest education level: Not on file   Occupational History    Not on file   Tobacco Use    Smoking status: Never    Smokeless tobacco: Never   Vaping Use    Vaping Use: Never used   Substance and Sexual Activity    Alcohol use: No     Alcohol/week: 0.0 standard drinks    Drug use: No    Sexual activity: Not on file   Other Topics Concern    Not on file   Social History Narrative    Not on file     Social Determinants of Health     Financial Resource Strain: Low Risk     Difficulty of Paying Living Expenses: Not hard at all   Food Insecurity: No Food Insecurity    Worried About Running Out of Food in the Last Year: Never true    Ran Out of Food in the Last Year: Never true   Transportation Needs: Not on file   Physical Activity: Not on file   Stress: Not on file   Social Connections: Not on file   Intimate Partner Violence: Not on file   Housing Stability: Not on file     Family History   Problem Relation Age of Onset    Heart Disease Mother     Alcohol Abuse Mother     Migraines Mother     Heart Disease Father     Stroke Father     Hypertension Father     Alcohol Abuse Other     Migraines Other        Review of Systems   Constitutional:  Negative for activity change, appetite change and unexpected weight change. Respiratory:  Negative for cough, chest tightness and shortness of breath. Cardiovascular:  Negative for chest pain, palpitations and leg swelling. Gastrointestinal:  Positive for diarrhea. Negative for abdominal pain, blood in stool, constipation, nausea and vomiting. Intermittent diarrhea   Musculoskeletal:  Negative for arthralgias and myalgias. Skin:  Negative for rash. Neurological:  Negative for light-headedness and headaches. Hematological:  Negative for adenopathy. Does not bruise/bleed easily. Psychiatric/Behavioral:  Negative for dysphoric mood, sleep disturbance and suicidal ideas. The patient is not nervous/anxious. OBJECTIVE:  /80   Pulse 50   Temp 97.1 °F (36.2 °C)   Resp 18   Ht 5' 6\" (1.676 m)   Wt 193 lb 9.6 oz (87.8 kg)   SpO2 98%   BMI 31.25 kg/m²   Physical Exam  Vitals and nursing note reviewed. Constitutional:       Appearance: She is well-developed. Eyes:      Pupils: Pupils are equal, round, and reactive to light. Neck:      Thyroid: No thyromegaly. Vascular: No JVD. Cardiovascular:      Rate and Rhythm: Normal rate and regular rhythm.    Pulmonary:      Effort: Pulmonary effort is normal.      Breath sounds: Normal breath sounds. Abdominal:      General: Bowel sounds are normal. There is no distension. Palpations: Abdomen is soft. Tenderness: There is no abdominal tenderness. There is no guarding. Musculoskeletal:      Cervical back: Normal range of motion and neck supple. Skin:     Findings: No rash. Neurological:      General: No focal deficit present. Mental Status: She is alert and oriented to person, place, and time. Psychiatric:         Behavior: Behavior normal.         Thought Content: Thought content normal.       ASSESSMENT/PLAN:    1. Type 2 diabetes mellitus with complication, without long-term current use of insulin ([facility]  Reviewed last labs. Continue with current management and refill meds  - POCT Urinalysis no Micro  - POCT Glucose    2. Major depressive disorder, recurrent, mild  Stress management. Continue and refill Zoloft    3. Recurrent major depressive disorder, in full remission ([facility]  Continue with current management and refill Zoloft    4. Diarrhea, unspecified type  Continue with daily fiber  Add daily probiotic    Food elimination trials with eliminating dairy and gluten intermittently to see if that changes symptoms    Stool cultures    GI evaluation if symptoms persist  - Gastrointestinal Panel, Molecular  - O&P PANEL ([facility]) #1  - Clostridium Difficile Toxin/Antigen  - Fecal Leukocytes; Future    5. Stage 3 chronic kidney disease, unspecified whether stage 3a or 3b CKD ([facility]  Continue follow-up with nephrology    6. Essential hypertension  Refill meds. Ambulatory blood pressure checks    DASH diet reminder    7. Osteopenia, unspecified location  Reviewed last DEXA. Continue increase calcium in diet and daily vitamin D    We will continue to monitor    8.  Need for vaccination    - Influenza, FLUAD, (age 72 y+), IM, Preservative Free, 0.5 mL      Orders Placed This Encounter   Procedures    Gastrointestinal Panel, Molecular    O&P PANEL ([facility]) #1 Clostridium Difficile Toxin/Antigen    Fecal Leukocytes     Standing Status:   Future     Standing Expiration Date:   11/11/2023    Influenza, FLUAD, (age 72 y+), IM, Preservative Free, 0.5 mL    POCT Urinalysis no Micro    POCT Glucose     Current Outpatient Medications   Medication Sig Dispense Refill    lisinopril (PRINIVIL;ZESTRIL) 10 MG tablet TAKE ONE TABLET BY MOUTH DAILY 90 tablet 0    pravastatin (PRAVACHOL) 40 MG tablet TAKE ONE TABLET BY MOUTH DAILY 90 tablet 0    bisoprolol-hydroCHLOROthiazide (ZIAC) 10-6.25 MG per tablet TAKE ONE TABLET BY MOUTH DAILY 90 tablet 0    sertraline (ZOLOFT) 100 MG tablet daily 90 tablet 0    aspirin 325 MG EC tablet Take 1 tablet by mouth 2 times daily Take  mg. Twice daily x 14 days; then resume your normal home dose of ASA 28 tablet 3    acetaminophen (TYLENOL) 500 MG tablet Take 1,000 mg by mouth 2 times daily (Patient not taking: Reported on 5/11/2022)      indomethacin (INDOCIN) 50 MG capsule Take 1 capsule by mouth 2 times daily (with meals) (Patient not taking: Reported on 5/11/2022) 20 capsule 0    bevacizumab (AVASTIN) 100 MG/4ML chemo injection inject 0.05mL intraocularly in affected eye. Handicap Placard MISC by Does not apply route Osteoarthritis, knee pain  1/2019-1/24 (Patient not taking: Reported on 5/11/2022) 1 each 0    Misc. Devices ([facility]) MISC 1 each by Does not apply route daily. Dispense and Fit for injury to left lower extremity and weakness. (Patient not taking: Reported on 5/11/2022) 1 each 0     No current facility-administered medications for this visit. Return in about 4 weeks (around 12/9/2022), or if symptoms worsen or fail to improve, for HTN, hypelripidmeia.     Ngoc Mark MD, MD

## 2022-11-17 DIAGNOSIS — R19.7 DIARRHEA, UNSPECIFIED TYPE: ICD-10-CM

## 2022-11-18 LAB — WHITE BLOOD CELLS (WBC), STOOL: NORMAL

## 2022-11-22 LAB — INTERPRETATION: NEGATIVE

## 2022-11-23 DIAGNOSIS — R19.7 DIARRHEA, UNSPECIFIED TYPE: Primary | ICD-10-CM

## 2022-12-09 ENCOUNTER — OFFICE VISIT (OUTPATIENT)
Dept: FAMILY MEDICINE CLINIC | Age: 77
End: 2022-12-09
Payer: MEDICARE

## 2022-12-09 VITALS
BODY MASS INDEX: 30.15 KG/M2 | WEIGHT: 187.6 LBS | DIASTOLIC BLOOD PRESSURE: 80 MMHG | HEIGHT: 66 IN | SYSTOLIC BLOOD PRESSURE: 130 MMHG | TEMPERATURE: 97.3 F | RESPIRATION RATE: 18 BRPM | OXYGEN SATURATION: 97 % | HEART RATE: 53 BPM

## 2022-12-09 DIAGNOSIS — F33.0 MAJOR DEPRESSIVE DISORDER, RECURRENT, MILD (HCC): ICD-10-CM

## 2022-12-09 DIAGNOSIS — R19.7 DIARRHEA, UNSPECIFIED TYPE: ICD-10-CM

## 2022-12-09 DIAGNOSIS — I10 ESSENTIAL HYPERTENSION: ICD-10-CM

## 2022-12-09 DIAGNOSIS — E11.8 TYPE 2 DIABETES MELLITUS WITH COMPLICATION, WITHOUT LONG-TERM CURRENT USE OF INSULIN (HCC): Primary | ICD-10-CM

## 2022-12-09 LAB
BILIRUBIN, POC: NORMAL
BLOOD URINE, POC: NORMAL
CHP ED QC CHECK: NORMAL
CLARITY, POC: NORMAL
COLOR, POC: YELLOW
GLUCOSE BLD-MCNC: 102 MG/DL
GLUCOSE URINE, POC: NORMAL
KETONES, POC: NORMAL
LEUKOCYTE EST, POC: NORMAL
NITRITE, POC: NORMAL
PH, POC: 5.5
PROTEIN, POC: NORMAL
SPECIFIC GRAVITY, POC: 1.02
UROBILINOGEN, POC: NORMAL

## 2022-12-09 PROCEDURE — 3044F HG A1C LEVEL LT 7.0%: CPT | Performed by: FAMILY MEDICINE

## 2022-12-09 PROCEDURE — 3078F DIAST BP <80 MM HG: CPT | Performed by: FAMILY MEDICINE

## 2022-12-09 PROCEDURE — 1123F ACP DISCUSS/DSCN MKR DOCD: CPT | Performed by: FAMILY MEDICINE

## 2022-12-09 PROCEDURE — 81002 URINALYSIS NONAUTO W/O SCOPE: CPT | Performed by: FAMILY MEDICINE

## 2022-12-09 PROCEDURE — 82962 GLUCOSE BLOOD TEST: CPT | Performed by: FAMILY MEDICINE

## 2022-12-09 PROCEDURE — 3074F SYST BP LT 130 MM HG: CPT | Performed by: FAMILY MEDICINE

## 2022-12-09 PROCEDURE — 99214 OFFICE O/P EST MOD 30 MIN: CPT | Performed by: FAMILY MEDICINE

## 2022-12-12 ASSESSMENT — ENCOUNTER SYMPTOMS
CHEST TIGHTNESS: 0
ABDOMINAL PAIN: 0
COUGH: 0
SHORTNESS OF BREATH: 0
BLOOD IN STOOL: 0

## 2022-12-12 NOTE — PROGRESS NOTES
After Visit Summary   10/6/2017    Beatrice Cadena    MRN: [id number]           Patient Information     Date Of Birth          1982        Visit Information        Provider Department      10/6/2017 11:20 AM Avis De León PA-C; [employer] [facility]3] [place]        Today's Diagnoses     Chronic pain of both shoulders    -  1    Acute pain of both knees        Need for influenza vaccination          Care Instructions    History/exam most suggestive of over-use type of problems with shoulder tendonitis and possible patellofemoral syndrome of her knees.  Can always treat with rest, ice and anti-inflammatories. Risks reviewed.  Would also likely benefit from further treatment with physical therapy.  Discussed potential for other autoimmune or rheumatoid arthritis given multiple joint involvement, but this seems less likely given area of involvement.  Can always consider labs and/or imaging if she doesn't improve or has on-going concerns.    Electronically Signed By: Avis De León PA-C            Follow-ups after your visit        Additional Services     PHYSICAL THERAPY REFERRAL       *This therapy referral will be filtered to a centralized scheduling office at [facility] and the patient will receive a call to schedule an appointment at a [place] location most convenient for them. *     [facility] provides Physical Therapy evaluation and treatment and many specialty services across the [place] system.  If requesting a specialty program, please choose from the list below.    If you have not heard from the scheduling office within 2 business days, please call [phone] for all locations, with the exception of [place], please call [phone].  Treatment: Evaluation & Treatment  Special Instructions/Modalities: per therapist evaluation  Special Programs: per therapist evaluation    Please be aware that  SUBJECTIVE:  Daryl Robbins   1945   female   No Known Allergies    Chief Complaint   Patient presents with    Follow-up    Diabetes        Patient Active Problem List    Diagnosis Date Noted    Major depressive disorder, recurrent, mild 11/11/2022     Priority: Medium    Major depressive disorder, recurrent, moderate 11/11/2022     Priority: Medium    Major depressive disorder, recurrent, unspecified 11/11/2022     Priority: Medium    CKD (chronic kidney disease), stage III ([facility] [address] 02/04/2020    Gout 02/04/2020     Formatting of this note might be different from the original.  off allopurinol      Vitamin D deficiency 02/04/2020    HTN (hypertension) 02/04/2020    DM2 (diabetes mellitus, type 2) ([facility] [address] 02/04/2020     Formatting of this note might be different from the original.  controlled her HgA1C was 6.      10/21/19 RIGHT TKA 10/21/2019    Encounter for postoperative care 10/21/2019    Prerenal azotemia 10/21/2019    Primary osteoarthritis of left knee 01/02/2019    Idiopathic chronic gout of left foot without tophus 10/31/2017    Type 2 diabetes mellitus with complication, without long-term current use of insulin ([address] 08/07/2016    Depression 03/04/2015    Ventral hernia 06/20/2014    Morbid obesity ([address] 06/20/2014    Macular degeneration 06/20/2014    Hypertension     Osteopenia     Hyperlipidemia     Squamous cell carcinoma of skin 11/16/2011       HPI   Patient with history of diabetes is here today for follow-up on depression and recent evaluation of chronic diarrhea and hypertension. Patient reports overall she has been feeling well and has no specific concerns or questions today. Been recently evaluated for chronic loose stools. No blood in the stool. No change in appetite or weight or diet. She was advised to try probiotics and increase fiber and try to avoid dairy and had some stool cultures. Stool cultures done were negative although C. difficile was not ordered.   Today patient returns "coverage of these services is subject to the terms and limitations of your health insurance plan.  Call member services at your health plan with any benefit or coverage questions.      **Note to Provider:  If you are referring outside of [place] for the therapy appointment, please list the name of the location in the \"special instructions\" above, print the referral and give to the patient to schedule the appointment.                  Who to contact     If you have questions or need follow up information about today's clinic visit or your schedule please contact [facility] directly at [phone].  Normal or non-critical lab and imaging results will be communicated to you by [employer], letter or phone within 4 business days after the clinic has received the results. If you do not hear from us within 7 days, please contact the clinic through [employer] or phone. If you have a critical or abnormal lab result, we will notify you by phone as soon as possible.  Submit refill requests through [employer] or call your pharmacy and they will forward the refill request to us. Please allow 3 business days for your refill to be completed.          Additional Information About Your Visit        [employer] Information     [employer] lets you send messages to your doctor, view your test results, renew your prescriptions, schedule appointments and more. To sign up, go to [email or web address] . Click on \"Log in\" on the left side of the screen, which will take you to the Welcome page. Then click on \"Sign up Now\" on the right side of the page.     You will be asked to enter the access code listed below, as well as some personal information. Please follow the directions to create your username and password.     Your access code is: [email or web address]  Expires: 2018 12:30 PM     Your access code will  in 90 days. If you need help or a new code, please call your [facility] or [phone].        Care EveryWhere ID     This " for follow-up and reports diarrhea has subsided completely with these changes in diet as well as taking probiotics. She is also been trying to adhere to a better diet and increase dietary fiber. Doing well on current management for diabetes. Been A1c is at 5.3. No episodes of hypoglycemia. Patient is doing well on current management for hyperlipidemia. Last cholesterol labs were slightly elevated at total cholesterol 219 and LDL up to 110 (from 71). She has been adhering to a better diet since then.'s of depression and anxiety. Sleeping well. No other concerns or questions today. She is currently followed by nephrology for chronic renal disease.   Past Medical History:   Diagnosis Date    CRD (chronic renal disease)     Depression     Herpes dermatitis     left thigh    Hyperlipidemia     Hypertension     Macular degeneration     Obesity     Osteopenia     Squamous acanthoma of face     Type II or unspecified type diabetes mellitus without mention of complication, not stated as uncontrolled      Social History     Socioeconomic History    Marital status:      Spouse name: Not on file    Number of children: Not on file    Years of education: Not on file    Highest education level: Not on file   Occupational History    Not on file   Tobacco Use    Smoking status: Never    Smokeless tobacco: Never   Vaping Use    Vaping Use: Never used   Substance and Sexual Activity    Alcohol use: No     Alcohol/week: 0.0 standard drinks    Drug use: No    Sexual activity: Not on file   Other Topics Concern    Not on file   Social History Narrative    Not on file     Social Determinants of Health     Financial Resource Strain: Not on file   Food Insecurity: Not on file   Transportation Needs: Not on file   Physical Activity: Not on file   Stress: Not on file   Social Connections: Not on file   Intimate Partner Violence: Not on file   Housing Stability: Not on file     Family History   Problem Relation Age of Onset "is your Care EveryWhere ID. This could be used by other organizations to access your [place] medical records  [id number]        Your Vitals Were     Pulse Temperature Height Pulse Oximetry Breastfeeding? BMI (Body Mass Index)    62 98  F (36.7  C) (Oral) 5' 0.5\" (1.537 m) 99% No 24.39 kg/m2       Blood Pressure from Last 3 Encounters:   10/06/17 106/62    Weight from Last 3 Encounters:   10/06/17 127 lb (57.6 kg)              We Performed the Following     PHYSICAL THERAPY REFERRAL        Primary Care Provider Office Phone # Fax #    Gurpreet Genao [phone]       [address] [facility] [address]        Equal Access to Services     [facility]: Hadii russell ku hadasho Soomaali, waaxda luqadaha, qaybta kaalmada adeegyada, waxjerel spraguein hayildan ashlee vences . So [facility] [phone].    ATENCIÓN: Si habla español, tiene a cox disposición servicios gratuitos de asistencia lingüística. Llame al [phone].    We comply with applicable federal civil rights laws and [place] laws. We do not discriminate on the basis of race, color, national origin, age, disability, sex, sexual orientation, or gender identity.            Thank you!     Thank you for choosing [facility]  for your care. Our goal is always to provide you with excellent care. Hearing back from our patients is one way we can continue to improve our services. Please take a few minutes to complete the written survey that you may receive in the mail after your visit with us. Thank you!             Your Updated Medication List - Protect others around you: Learn how to safely use, store and throw away your medicines at www.disposemymeds.org.      Notice  As of 10/6/2017 12:30 PM    You have not been prescribed any medications.      " Heart Disease Mother     Alcohol Abuse Mother     Migraines Mother     Heart Disease Father     Stroke Father     Hypertension Father     Alcohol Abuse Other     Migraines Other        Review of Systems   Constitutional:  Negative for activity change, appetite change and unexpected weight change. Respiratory:  Negative for cough, chest tightness and shortness of breath. Cardiovascular:  Negative for chest pain, palpitations and leg swelling. Gastrointestinal:  Negative for abdominal pain and blood in stool. Musculoskeletal:  Negative for arthralgias and myalgias. Skin:  Negative for rash. Neurological:  Negative for light-headedness and headaches. Hematological:  Negative for adenopathy. Does not bruise/bleed easily. Psychiatric/Behavioral:  Negative for dysphoric mood and sleep disturbance. The patient is not nervous/anxious. OBJECTIVE:  /80   Pulse 53   Temp 97.3 °F (36.3 °C)   Resp 18   Ht 5' 6\" (1.676 m)   Wt 187 lb 9.6 oz (85.1 kg)   SpO2 97%   BMI 30.28 kg/m²   Physical Exam  Vitals and nursing note reviewed. Constitutional:       Appearance: She is well-developed. Eyes:      Pupils: Pupils are equal, round, and reactive to light. Neck:      Thyroid: No thyromegaly. Vascular: No JVD. Cardiovascular:      Rate and Rhythm: Normal rate and regular rhythm. Pulmonary:      Effort: Pulmonary effort is normal.      Breath sounds: Normal breath sounds. Abdominal:      General: Bowel sounds are normal.      Palpations: Abdomen is soft. Tenderness: There is no abdominal tenderness. Musculoskeletal:      Cervical back: Normal range of motion and neck supple. Skin:     Findings: No rash. Neurological:      General: No focal deficit present. Mental Status: She is alert and oriented to person, place, and time. Psychiatric:         Behavior: Behavior normal.         Thought Content: Thought content normal.       ASSESSMENT/PLAN:    1.  Type 2 diabetes mellitus with complication, without long-term current use of insulin ([facility] [address]  Continue with current management and refill meds    Ambulatory blood sugar checks  Reviewed last labs  - POCT Urinalysis no Micro  - POCT Glucose    2. Diarrhea, unspecified type  Resolved. Continue with dietary changes increase fiber and probiotics    3. Major depressive disorder, recurrent, mild  Continue with current management    4. Essential hypertension  Ambulatory blood pressure checks. Refill meds      Orders Placed This Encounter   Procedures    POCT Urinalysis no Micro    POCT Glucose     Current Outpatient Medications   Medication Sig Dispense Refill    lisinopril (PRINIVIL;ZESTRIL) 10 MG tablet TAKE ONE TABLET BY MOUTH DAILY 90 tablet 0    pravastatin (PRAVACHOL) 40 MG tablet TAKE ONE TABLET BY MOUTH DAILY 90 tablet 0    bisoprolol-hydroCHLOROthiazide (ZIAC) 10-6.25 MG per tablet TAKE ONE TABLET BY MOUTH DAILY 90 tablet 0    sertraline (ZOLOFT) 100 MG tablet daily 90 tablet 0    aspirin 325 MG EC tablet Take 1 tablet by mouth 2 times daily Take  mg. Twice daily x 14 days; then resume your normal home dose of ASA 28 tablet 3    acetaminophen (TYLENOL) 500 MG tablet Take 1,000 mg by mouth 2 times daily (Patient not taking: Reported on 5/11/2022)      indomethacin (INDOCIN) 50 MG capsule Take 1 capsule by mouth 2 times daily (with meals) (Patient not taking: Reported on 5/11/2022) 20 capsule 0    bevacizumab (AVASTIN) 100 MG/4ML chemo injection inject 0.05mL intraocularly in affected eye. Handicap Placard MISC by Does not apply route Osteoarthritis, knee pain  1/2019-1/24 (Patient not taking: Reported on 5/11/2022) 1 each 0    Misc. Devices (WALKER) MISC 1 each by Does not apply route daily. Dispense and Fit for injury to left lower extremity and weakness. (Patient not taking: Reported on 5/11/2022) 1 each 0     No current facility-administered medications for this visit.         Return in about 2 months (around 2/9/2023), or if symptoms worsen or fail to improve, for diabetes,HTN.     Tejal Martinez MD, MD

## 2022-12-22 ENCOUNTER — OFFICE VISIT (OUTPATIENT)
Dept: FAMILY MEDICINE CLINIC | Age: 77
End: 2022-12-22
Payer: MEDICARE

## 2022-12-22 VITALS
TEMPERATURE: 97.1 F | HEART RATE: 52 BPM | BODY MASS INDEX: 29.38 KG/M2 | WEIGHT: 182 LBS | SYSTOLIC BLOOD PRESSURE: 120 MMHG | OXYGEN SATURATION: 97 % | DIASTOLIC BLOOD PRESSURE: 74 MMHG

## 2022-12-22 DIAGNOSIS — Z00.00 MEDICARE ANNUAL WELLNESS VISIT, SUBSEQUENT: Primary | ICD-10-CM

## 2022-12-22 PROCEDURE — 3078F DIAST BP <80 MM HG: CPT | Performed by: FAMILY MEDICINE

## 2022-12-22 PROCEDURE — 1123F ACP DISCUSS/DSCN MKR DOCD: CPT | Performed by: FAMILY MEDICINE

## 2022-12-22 PROCEDURE — 3074F SYST BP LT 130 MM HG: CPT | Performed by: FAMILY MEDICINE

## 2022-12-22 PROCEDURE — G0439 PPPS, SUBSEQ VISIT: HCPCS | Performed by: FAMILY MEDICINE

## 2022-12-22 ASSESSMENT — PATIENT HEALTH QUESTIONNAIRE - PHQ9
3. TROUBLE FALLING OR STAYING ASLEEP: 0
6. FEELING BAD ABOUT YOURSELF - OR THAT YOU ARE A FAILURE OR HAVE LET YOURSELF OR YOUR FAMILY DOWN: 0
5. POOR APPETITE OR OVEREATING: 0
9. THOUGHTS THAT YOU WOULD BE BETTER OFF DEAD, OR OF HURTING YOURSELF: 0
10. IF YOU CHECKED OFF ANY PROBLEMS, HOW DIFFICULT HAVE THESE PROBLEMS MADE IT FOR YOU TO DO YOUR WORK, TAKE CARE OF THINGS AT HOME, OR GET ALONG WITH OTHER PEOPLE: 0
4. FEELING TIRED OR HAVING LITTLE ENERGY: 0
7. TROUBLE CONCENTRATING ON THINGS, SUCH AS READING THE NEWSPAPER OR WATCHING TELEVISION: 0
SUM OF ALL RESPONSES TO PHQ QUESTIONS 1-9: 1
8. MOVING OR SPEAKING SO SLOWLY THAT OTHER PEOPLE COULD HAVE NOTICED. OR THE OPPOSITE, BEING SO FIGETY OR RESTLESS THAT YOU HAVE BEEN MOVING AROUND A LOT MORE THAN USUAL: 0
SUM OF ALL RESPONSES TO PHQ QUESTIONS 1-9: 1
2. FEELING DOWN, DEPRESSED OR HOPELESS: 1

## 2022-12-22 ASSESSMENT — LIFESTYLE VARIABLES
HOW OFTEN DO YOU HAVE A DRINK CONTAINING ALCOHOL: NEVER
HOW MANY STANDARD DRINKS CONTAINING ALCOHOL DO YOU HAVE ON A TYPICAL DAY: PATIENT DOES NOT DRINK

## 2022-12-22 NOTE — PATIENT INSTRUCTIONS
Personalized Preventive Plan for Kaylan Dooley - 12/22/2022  Medicare offers a range of preventive health benefits. Some of the tests and screenings are paid in full while other may be subject to a deductible, co-insurance, and/or copay. Some of these benefits include a comprehensive review of your medical history including lifestyle, illnesses that may run in your family, and various assessments and screenings as appropriate. After reviewing your medical record and screening and assessments performed today your provider may have ordered immunizations, labs, imaging, and/or referrals for you. A list of these orders (if applicable) as well as your Preventive Care list are included within your After Visit Summary for your review. Other Preventive Recommendations:    A preventive eye exam performed by an eye specialist is recommended every 1-2 years to screen for glaucoma; cataracts, macular degeneration, and other eye disorders. A preventive dental visit is recommended every 6 months. Try to get at least 150 minutes of exercise per week or 10,000 steps per day on a pedometer . Order or download the FREE \"Exercise & Physical Activity: Your Everyday Guide\" from [employer]. Call 9-603.814.9301 or search [employer] online. You need 0804-7630 mg of calcium and 1739-6530 IU of vitamin D per day. It is possible to meet your calcium requirement with diet alone, but a vitamin D supplement is usually necessary to meet this goal.  When exposed to the sun, use a sunscreen that protects against both UVA and UVB radiation with an SPF of 30 or greater. Reapply every 2 to 3 hours or after sweating, drying off with a towel, or swimming. Always wear a seat belt when traveling in a car. Always wear a helmet when riding a bicycle or motorcycle.

## 2022-12-22 NOTE — PROGRESS NOTES
Medicare Annual Wellness Visit    Shruthi Gonsalves is here for Medicare AWV    Assessment & Plan    Recommendations for Preventive Services Due: see orders and patient instructions/AVS.  Recommended screening schedule for the next 5-10 years is provided to the patient in written form: see Patient Instructions/AVS.     No follow-ups on file. Subjective       Patient's complete Health Risk Assessment and screening values have been reviewed and are found in Flowsheets. The following problems were reviewed today and where indicated follow up appointments were made and/or referrals ordered. Positive Risk Factor Screenings with Interventions:    Fall Risk:  Do you feel unsteady or are you worried about falling? : (!) yes (due to previous fall is concerned)  2 or more falls in past year?: no  Fall with injury in past year?: no     Interventions:    Patient declines any further evaluation or treatment                 Weight and Activity:  Physical Activity: Insufficiently Active    Days of Exercise per Week: 1 day    Minutes of Exercise per Session: 60 min     On average, how many days per week do you engage in moderate to strenuous exercise (like a brisk walk)?: 1 day  Have you lost any weight without trying in the past 3 months?: No           Dentist Screen:  Have you seen the dentist within the past year?: (!) No    Intervention:  Advised to schedule with their dentist                        Objective   Vitals:    12/22/22 1017   BP: 120/74   Site: Right Upper Arm   Pulse: 52   Temp: 97.1 °F (36.2 °C)   SpO2: 97%   Weight: 182 lb (82.6 kg)      Body mass index is 29.38 kg/m².       General Appearance: alert and oriented to person, place and time, well-developed and well-nourished, in no acute distress  Skin: warm and dry  Eyes: pupils equal, round, and reactive to light  Pulmonary/Chest: clear to auscultation bilaterally- no wheezes, rales or rhonchi, normal air movement, no respiratory distress  Abdomen: soft, non-tender, non-distended, and bowel sounds normal  Extremities: no edema       No Known Allergies  Prior to Visit Medications    Medication Sig Taking? Authorizing Provider   lisinopril (PRINIVIL;ZESTRIL) 10 MG tablet TAKE ONE TABLET BY MOUTH DAILY Yes Pancho Moses MD   pravastatin (PRAVACHOL) 40 MG tablet TAKE ONE TABLET BY MOUTH DAILY Yes Pancho Moses MD   bisoprolol-hydroCHLOROthiazide (ZIAC) 10-6.25 MG per tablet TAKE ONE TABLET BY MOUTH DAILY Yes Sydni Moses MD   sertraline (ZOLOFT) 100 MG tablet daily Yes Pancho Moses MD   bevacizumab (AVASTIN) 100 MG/4ML chemo injection inject 0.05mL intraocularly in affected eye. Yes Historical Provider, MD   Cholecalciferol (VITAMIN D-1000 MAX ST PO) Take by mouth  Historical Provider, MD   Handicap Placard MISC by Does not apply route Osteoarthritis, knee pain  1/2019-1/24  Patient not taking: No sig reported  Zoraida Kanner, MD       [facility] (Including outside providers/suppliers regularly involved in providing care):   Patient Care Team:  Zoraida Kanner, MD as PCP - General (Family Medicine)  Zoraida Kanner, MD as PCP - [facility] Empaneled Provider  David Whitten MD as Consulting Physician (Medical Oncology)  Julee Price (Ophthalmology)     Reviewed and updated this visit:  Tobacco  Allergies  Meds  Med Hx  Surg Hx  Soc Hx  Fam Hx        Kaylin Galindo LPN, 61/30/2173, performed the documented evaluation under the direct supervision of the attending physician.

## 2023-02-08 ENCOUNTER — OFFICE VISIT (OUTPATIENT)
Dept: FAMILY MEDICINE CLINIC | Age: 78
End: 2023-02-08
Payer: MEDICARE

## 2023-02-08 VITALS
WEIGHT: 173 LBS | HEART RATE: 56 BPM | TEMPERATURE: 97.4 F | BODY MASS INDEX: 27.8 KG/M2 | RESPIRATION RATE: 18 BRPM | SYSTOLIC BLOOD PRESSURE: 122 MMHG | DIASTOLIC BLOOD PRESSURE: 70 MMHG | HEIGHT: 66 IN | OXYGEN SATURATION: 99 %

## 2023-02-08 DIAGNOSIS — F33.0 MAJOR DEPRESSIVE DISORDER, RECURRENT, MILD (HCC): ICD-10-CM

## 2023-02-08 DIAGNOSIS — N18.30 STAGE 3 CHRONIC KIDNEY DISEASE, UNSPECIFIED WHETHER STAGE 3A OR 3B CKD (HCC): ICD-10-CM

## 2023-02-08 DIAGNOSIS — I10 ESSENTIAL HYPERTENSION: ICD-10-CM

## 2023-02-08 DIAGNOSIS — E11.8 TYPE 2 DIABETES MELLITUS WITH COMPLICATION, WITHOUT LONG-TERM CURRENT USE OF INSULIN (HCC): Primary | ICD-10-CM

## 2023-02-08 DIAGNOSIS — M85.80 OSTEOPENIA, UNSPECIFIED LOCATION: ICD-10-CM

## 2023-02-08 LAB
CHP ED QC CHECK: NORMAL
GLUCOSE BLD-MCNC: 108 MG/DL

## 2023-02-08 PROCEDURE — 1123F ACP DISCUSS/DSCN MKR DOCD: CPT | Performed by: FAMILY MEDICINE

## 2023-02-08 PROCEDURE — 99214 OFFICE O/P EST MOD 30 MIN: CPT | Performed by: FAMILY MEDICINE

## 2023-02-08 PROCEDURE — 3078F DIAST BP <80 MM HG: CPT | Performed by: FAMILY MEDICINE

## 2023-02-08 PROCEDURE — 3074F SYST BP LT 130 MM HG: CPT | Performed by: FAMILY MEDICINE

## 2023-02-08 PROCEDURE — 82962 GLUCOSE BLOOD TEST: CPT | Performed by: FAMILY MEDICINE

## 2023-02-08 RX ORDER — SERTRALINE HYDROCHLORIDE 100 MG/1
TABLET, FILM COATED ORAL
Qty: 90 TABLET | Refills: 0 | Status: SHIPPED | OUTPATIENT
Start: 2023-02-08

## 2023-02-08 RX ORDER — BISOPROLOL FUMARATE AND HYDROCHLOROTHIAZIDE 10; 6.25 MG/1; MG/1
TABLET ORAL
Qty: 90 TABLET | Refills: 0 | Status: SHIPPED | OUTPATIENT
Start: 2023-02-08

## 2023-02-08 RX ORDER — LISINOPRIL 10 MG/1
TABLET ORAL
Qty: 90 TABLET | Refills: 0 | Status: SHIPPED | OUTPATIENT
Start: 2023-02-08

## 2023-02-08 RX ORDER — PRAVASTATIN SODIUM 40 MG
TABLET ORAL
Qty: 90 TABLET | Refills: 0 | Status: SHIPPED | OUTPATIENT
Start: 2023-02-08

## 2023-02-08 ASSESSMENT — ENCOUNTER SYMPTOMS
CONSTIPATION: 0
BLOOD IN STOOL: 0
DIARRHEA: 0
COUGH: 0
NAUSEA: 0
SHORTNESS OF BREATH: 0
ABDOMINAL PAIN: 0
CHEST TIGHTNESS: 0

## 2023-02-08 ASSESSMENT — PATIENT HEALTH QUESTIONNAIRE - PHQ9
7. TROUBLE CONCENTRATING ON THINGS, SUCH AS READING THE NEWSPAPER OR WATCHING TELEVISION: 0
8. MOVING OR SPEAKING SO SLOWLY THAT OTHER PEOPLE COULD HAVE NOTICED. OR THE OPPOSITE, BEING SO FIGETY OR RESTLESS THAT YOU HAVE BEEN MOVING AROUND A LOT MORE THAN USUAL: 0
5. POOR APPETITE OR OVEREATING: 0
SUM OF ALL RESPONSES TO PHQ QUESTIONS 1-9: 0
SUM OF ALL RESPONSES TO PHQ QUESTIONS 1-9: 0
2. FEELING DOWN, DEPRESSED OR HOPELESS: 0
SUM OF ALL RESPONSES TO PHQ QUESTIONS 1-9: 0
1. LITTLE INTEREST OR PLEASURE IN DOING THINGS: 0
3. TROUBLE FALLING OR STAYING ASLEEP: 0
9. THOUGHTS THAT YOU WOULD BE BETTER OFF DEAD, OR OF HURTING YOURSELF: 0
SUM OF ALL RESPONSES TO PHQ9 QUESTIONS 1 & 2: 0
SUM OF ALL RESPONSES TO PHQ QUESTIONS 1-9: 0
4. FEELING TIRED OR HAVING LITTLE ENERGY: 0
6. FEELING BAD ABOUT YOURSELF - OR THAT YOU ARE A FAILURE OR HAVE LET YOURSELF OR YOUR FAMILY DOWN: 0
10. IF YOU CHECKED OFF ANY PROBLEMS, HOW DIFFICULT HAVE THESE PROBLEMS MADE IT FOR YOU TO DO YOUR WORK, TAKE CARE OF THINGS AT HOME, OR GET ALONG WITH OTHER PEOPLE: 0

## 2023-02-08 NOTE — PROGRESS NOTES
SUBJECTIVE:  Irma Pak   1945   female   No Known Allergies    Chief Complaint   Patient presents with    Hypertension    Diabetes    Follow-up        Patient Active Problem List    Diagnosis Date Noted    Major depressive disorder, recurrent, mild 11/11/2022     Priority: Medium    Major depressive disorder, recurrent, moderate 11/11/2022     Priority: Medium    Major depressive disorder, recurrent, unspecified 11/11/2022     Priority: Medium    CKD (chronic kidney disease), stage III ([facility] [address] 02/04/2020    Gout 02/04/2020     Formatting of this note might be different from the original.  off allopurinol      Vitamin D deficiency 02/04/2020    HTN (hypertension) 02/04/2020    DM2 (diabetes mellitus, type 2) ([facility] [address] 02/04/2020     Formatting of this note might be different from the original.  controlled her HgA1C was 6.      10/21/19 RIGHT TKA 10/21/2019    Encounter for postoperative care 10/21/2019    Prerenal azotemia 10/21/2019    Primary osteoarthritis of left knee 01/02/2019    Idiopathic chronic gout of left foot without tophus 10/31/2017    Type 2 diabetes mellitus with complication, without long-term current use of insulin ([address] 08/07/2016    Depression 03/04/2015    Ventral hernia 06/20/2014    Morbid obesity ([address] 06/20/2014    Macular degeneration 06/20/2014    Hypertension     Osteopenia     Hyperlipidemia     Squamous cell carcinoma of skin 11/16/2011       HPI   Patient is here today for follow-up on diabetes, depression, chronic renal disease, hypertension and osteopenia. Patient has had a recent DEXA scan within the year which did show osteopenia. She is currently on vitamin D and calcium. Very little actual physical activity. Patient denies chest pain, shortness of breath or myalgias. Denies headache change in vision or any neurologic symptoms. No GI or bowel concerns. She is currently followed by nephrology for chronic renal disease and stable on current treatment.   Patient denies chest pain, shortness of breath or myalgias.  Denies headache change in vision or any neurologic symptoms.  She is up-to-date with her mammogram until later this spring.  Last hemoglobin A1c in September was at 5.3.  Patient does not check blood sugars regularly at home but no episodes of hypoglycemia.  Last cholesterol labs slightly elevated with total cholesterol of 219 and LDL of 110 (up from 71).  Denies symptoms of depression or anxiety.  Sleeping well.  Past Medical History:   Diagnosis Date    CRD (chronic renal disease)     Depression     Herpes dermatitis     left thigh    Hyperlipidemia     Hypertension     Macular degeneration     Obesity     Osteopenia     Squamous acanthoma of face     Type II or unspecified type diabetes mellitus without mention of complication, not stated as uncontrolled      Social History     Socioeconomic History    Marital status:      Spouse name: Not on file    Number of children: Not on file    Years of education: Not on file    Highest education level: Not on file   Occupational History    Not on file   Tobacco Use    Smoking status: Never    Smokeless tobacco: Never   Vaping Use    Vaping Use: Never used   Substance and Sexual Activity    Alcohol use: No     Alcohol/week: 0.0 standard drinks    Drug use: No    Sexual activity: Not on file   Other Topics Concern    Not on file   Social History Narrative    Not on file     Social Determinants of Health     Financial Resource Strain: Not on file   Food Insecurity: Not on file   Transportation Needs: Not on file   Physical Activity: Insufficiently Active    Days of Exercise per Week: 1 day    Minutes of Exercise per Session: 60 min   Stress: Not on file   Social Connections: Not on file   Intimate Partner Violence: Not on file   Housing Stability: Not on file     Family History   Problem Relation Age of Onset    Heart Disease Mother     Alcohol Abuse Mother     Migraines Mother     Heart Disease Father     Stroke Father   Hypertension Father     Alcohol Abuse Other     Migraines Other        Review of Systems   Constitutional:  Negative for activity change, appetite change and unexpected weight change. Respiratory:  Negative for cough, chest tightness and shortness of breath. Cardiovascular:  Negative for chest pain and palpitations. Gastrointestinal:  Negative for abdominal pain, blood in stool, constipation, diarrhea and nausea. Musculoskeletal:  Negative for arthralgias and myalgias. Skin:  Negative for rash. Neurological:  Negative for light-headedness and headaches. Hematological:  Negative for adenopathy. Does not bruise/bleed easily. Psychiatric/Behavioral:  Negative for dysphoric mood, sleep disturbance and suicidal ideas. The patient is not nervous/anxious. OBJECTIVE:  /70   Pulse 56   Temp 97.4 °F (36.3 °C)   Resp 18   Ht 5' 6\" (1.676 m)   Wt 173 lb (78.5 kg)   SpO2 99%   BMI 27.92 kg/m²   Physical Exam  Vitals and nursing note reviewed. Constitutional:       Appearance: She is well-developed. Eyes:      Pupils: Pupils are equal, round, and reactive to light. Neck:      Thyroid: No thyromegaly. Vascular: No JVD. Cardiovascular:      Rate and Rhythm: Normal rate and regular rhythm. Pulmonary:      Effort: Pulmonary effort is normal.      Breath sounds: Normal breath sounds. Abdominal:      General: Bowel sounds are normal.      Palpations: Abdomen is soft. Tenderness: There is no abdominal tenderness. Musculoskeletal:      Cervical back: Normal range of motion and neck supple. Skin:     Findings: No rash. Neurological:      General: No focal deficit present. Mental Status: She is alert and oriented to person, place, and time. Psychiatric:         Behavior: Behavior normal.         Thought Content: Thought content normal.       ASSESSMENT/PLAN:    1.  Type 2 diabetes mellitus with complication, without long-term current use of insulin ([address]  Continue with current management and refill meds  Reviewed last labs  Recheck blood work  - POCT Glucose    2. Major depressive disorder, recurrent, mild  Stress management. Doing well on Zoloft    3. Stage 3 chronic kidney disease, unspecified whether stage 3a or 3b CKD ([facility] [address]  Continue follow-up with nephrology    4. Essential hypertension  Continue and refill meds  Ambulatory blood pressure checks  DASH diet reminder  Labs    5. Osteopenia, unspecified location  Reviewed last DEXA. We will continue with current treatment and monitor      Orders Placed This Encounter   Procedures    POCT Glucose     Current Outpatient Medications   Medication Sig Dispense Refill    lisinopril (PRINIVIL;ZESTRIL) 10 MG tablet TAKE ONE TABLET BY MOUTH DAILY 90 tablet 0    pravastatin (PRAVACHOL) 40 MG tablet TAKE ONE TABLET BY MOUTH DAILY 90 tablet 0    bisoprolol-hydroCHLOROthiazide (ZIAC) 10-6.25 MG per tablet TAKE ONE TABLET BY MOUTH DAILY 90 tablet 0    sertraline (ZOLOFT) 100 MG tablet daily 90 tablet 0    Cholecalciferol (VITAMIN D-1000 MAX ST PO) Take by mouth      bevacizumab (AVASTIN) 100 MG/4ML chemo injection inject 0.05mL intraocularly in affected eye. Handicap Placard MISC by Does not apply route Osteoarthritis, knee pain  1/2019-1/24 (Patient not taking: No sig reported) 1 each 0     No current facility-administered medications for this visit. Return in about 3 months (around 5/8/2023), or if symptoms worsen or fail to improve, for diabetes, HTN, depression.     Finn Cook MD, MD

## 2023-03-03 ENCOUNTER — TELEPHONE (OUTPATIENT)
Dept: FAMILY MEDICINE CLINIC | Age: 78
End: 2023-03-03

## 2023-03-03 NOTE — TELEPHONE ENCOUNTER
Azul from [facility] call back [phone]  In regards to a new drug recommendation for class SGLT2 inhibiter's as Arlon Earing and jardiance due to pt having kidney failure and diabetes  Ppw was sent over 12/9 /22       Please advise

## 2023-04-25 RX ORDER — SERTRALINE HYDROCHLORIDE 100 MG/1
TABLET, FILM COATED ORAL
Qty: 90 TABLET | Refills: 0 | OUTPATIENT
Start: 2023-04-25

## 2023-04-27 ENCOUNTER — TELEPHONE (OUTPATIENT)
Dept: FAMILY MEDICINE CLINIC | Age: 78
End: 2023-04-27

## 2023-05-02 RX ORDER — SERTRALINE HYDROCHLORIDE 100 MG/1
TABLET, FILM COATED ORAL
Qty: 90 TABLET | Refills: 0 | OUTPATIENT
Start: 2023-05-02

## 2023-05-03 ENCOUNTER — OFFICE VISIT (OUTPATIENT)
Dept: FAMILY MEDICINE CLINIC | Age: 78
End: 2023-05-03
Payer: MEDICARE

## 2023-05-03 VITALS
WEIGHT: 181.4 LBS | HEART RATE: 72 BPM | TEMPERATURE: 97.3 F | BODY MASS INDEX: 29.15 KG/M2 | DIASTOLIC BLOOD PRESSURE: 62 MMHG | HEIGHT: 66 IN | SYSTOLIC BLOOD PRESSURE: 126 MMHG | RESPIRATION RATE: 18 BRPM | OXYGEN SATURATION: 95 %

## 2023-05-03 DIAGNOSIS — M85.80 OSTEOPENIA, UNSPECIFIED LOCATION: ICD-10-CM

## 2023-05-03 DIAGNOSIS — E78.5 HYPERLIPIDEMIA, UNSPECIFIED HYPERLIPIDEMIA TYPE: ICD-10-CM

## 2023-05-03 DIAGNOSIS — E11.8 TYPE 2 DIABETES MELLITUS WITH COMPLICATION, WITHOUT LONG-TERM CURRENT USE OF INSULIN (HCC): Primary | ICD-10-CM

## 2023-05-03 DIAGNOSIS — I10 ESSENTIAL HYPERTENSION: ICD-10-CM

## 2023-05-03 DIAGNOSIS — F33.0 MAJOR DEPRESSIVE DISORDER, RECURRENT, MILD (HCC): ICD-10-CM

## 2023-05-03 LAB
BILIRUBIN, POC: NORMAL
BLOOD URINE, POC: NORMAL
CHP ED QC CHECK: NORMAL
CLARITY, POC: CLEAR
COLOR, POC: YELLOW
GLUCOSE BLD-MCNC: 115 MG/DL
GLUCOSE URINE, POC: NORMAL
KETONES, POC: NORMAL
LEUKOCYTE EST, POC: NORMAL
NITRITE, POC: NORMAL
PH, POC: 5.5
PROTEIN, POC: NORMAL
SPECIFIC GRAVITY, POC: 1.02
UROBILINOGEN, POC: 0.2

## 2023-05-03 PROCEDURE — 3078F DIAST BP <80 MM HG: CPT | Performed by: FAMILY MEDICINE

## 2023-05-03 PROCEDURE — 3044F HG A1C LEVEL LT 7.0%: CPT | Performed by: FAMILY MEDICINE

## 2023-05-03 PROCEDURE — 3074F SYST BP LT 130 MM HG: CPT | Performed by: FAMILY MEDICINE

## 2023-05-03 PROCEDURE — 1123F ACP DISCUSS/DSCN MKR DOCD: CPT | Performed by: FAMILY MEDICINE

## 2023-05-03 PROCEDURE — 82962 GLUCOSE BLOOD TEST: CPT | Performed by: FAMILY MEDICINE

## 2023-05-03 PROCEDURE — 81002 URINALYSIS NONAUTO W/O SCOPE: CPT | Performed by: FAMILY MEDICINE

## 2023-05-03 PROCEDURE — 99214 OFFICE O/P EST MOD 30 MIN: CPT | Performed by: FAMILY MEDICINE

## 2023-05-03 RX ORDER — BISOPROLOL FUMARATE AND HYDROCHLOROTHIAZIDE 10; 6.25 MG/1; MG/1
TABLET ORAL
Qty: 90 TABLET | Refills: 0 | Status: SHIPPED | OUTPATIENT
Start: 2023-05-03

## 2023-05-03 RX ORDER — SERTRALINE HYDROCHLORIDE 100 MG/1
TABLET, FILM COATED ORAL
Qty: 135 TABLET | Refills: 0 | Status: SHIPPED | OUTPATIENT
Start: 2023-05-03

## 2023-05-03 RX ORDER — LISINOPRIL 10 MG/1
TABLET ORAL
Qty: 90 TABLET | Refills: 0 | Status: SHIPPED | OUTPATIENT
Start: 2023-05-03

## 2023-05-03 RX ORDER — PRAVASTATIN SODIUM 40 MG
TABLET ORAL
Qty: 90 TABLET | Refills: 0 | Status: SHIPPED | OUTPATIENT
Start: 2023-05-03

## 2023-05-03 ASSESSMENT — ENCOUNTER SYMPTOMS
CHEST TIGHTNESS: 0
COUGH: 0
BLOOD IN STOOL: 0
SHORTNESS OF BREATH: 0
DIARRHEA: 0
ABDOMINAL PAIN: 0
CONSTIPATION: 0

## 2023-05-03 NOTE — PROGRESS NOTES
SUBJECTIVE:  Yovani Arroyo   1945   female   No Known Allergies    Chief Complaint   Patient presents with    Diabetes    Depression    Hypertension        Patient Active Problem List    Diagnosis Date Noted    Major depressive disorder, recurrent, mild 11/11/2022     Priority: Medium    Major depressive disorder, recurrent, moderate 11/11/2022     Priority: Medium    Major depressive disorder, recurrent, unspecified 11/11/2022     Priority: Medium    CKD (chronic kidney disease), stage III ([facility] [address] 02/04/2020    Gout 02/04/2020     Formatting of this note might be different from the original.  off allopurinol      Vitamin D deficiency 02/04/2020    HTN (hypertension) 02/04/2020    DM2 (diabetes mellitus, type 2) ([facility] [address] 02/04/2020     Formatting of this note might be different from the original.  controlled her HgA1C was 6.      10/21/19 RIGHT TKA 10/21/2019    Encounter for postoperative care 10/21/2019    Prerenal azotemia 10/21/2019    Primary osteoarthritis of left knee 01/02/2019    Idiopathic chronic gout of left foot without tophus 10/31/2017    Type 2 diabetes mellitus with complication, without long-term current use of insulin ([facility] [address] 08/07/2016    Depression 03/04/2015    Ventral hernia 06/20/2014    Morbid obesity ([facility] [address] 06/20/2014    Macular degeneration 06/20/2014    Hypertension     Osteopenia     Hyperlipidemia     Squamous cell carcinoma of skin 11/16/2011       HPI   Patient is here today for follow-up on diabetes, hypertension, depression, osteopenia and hyperlipidemia. Patient reports overall she has been feeling very well and has been stable on current medication management. Patient denies chest pain, shortness of breath or myalgias. Denies headache change in vision or any neurologic symptoms. No GI or bowel complaints. Patient reports some she has essentially given up eating meat because she was tired of preparing it and eating it. She is also given up dairy.   Since making changes in

## 2023-07-13 ENCOUNTER — TELEPHONE (OUTPATIENT)
Dept: FAMILY MEDICINE CLINIC | Age: 78
End: 2023-07-13

## 2023-07-18 ENCOUNTER — OFFICE VISIT (OUTPATIENT)
Dept: FAMILY MEDICINE CLINIC | Age: 78
End: 2023-07-18
Payer: MEDICARE

## 2023-07-18 VITALS
BODY MASS INDEX: 28.77 KG/M2 | SYSTOLIC BLOOD PRESSURE: 122 MMHG | HEIGHT: 66 IN | TEMPERATURE: 97.1 F | DIASTOLIC BLOOD PRESSURE: 64 MMHG | WEIGHT: 179 LBS | OXYGEN SATURATION: 98 % | HEART RATE: 50 BPM

## 2023-07-18 DIAGNOSIS — Z00.00 MEDICARE ANNUAL WELLNESS VISIT, SUBSEQUENT: Primary | ICD-10-CM

## 2023-07-18 DIAGNOSIS — E11.8 TYPE 2 DIABETES MELLITUS WITH COMPLICATION, WITHOUT LONG-TERM CURRENT USE OF INSULIN (HCC): ICD-10-CM

## 2023-07-18 DIAGNOSIS — E78.5 HYPERLIPIDEMIA, UNSPECIFIED HYPERLIPIDEMIA TYPE: ICD-10-CM

## 2023-07-18 LAB
ALBUMIN SERPL-MCNC: 4.3 G/DL (ref 3.4–5)
ALBUMIN/GLOB SERPL: 1.7 {RATIO} (ref 1.1–2.2)
ALP SERPL-CCNC: 116 U/L (ref 40–129)
ALT SERPL-CCNC: 10 U/L (ref 10–40)
ANION GAP SERPL CALCULATED.3IONS-SCNC: 9 MMOL/L (ref 3–16)
AST SERPL-CCNC: 16 U/L (ref 15–37)
BASOPHILS # BLD: 0 K/UL (ref 0–0.2)
BASOPHILS NFR BLD: 0.5 %
BILIRUB SERPL-MCNC: 0.4 MG/DL (ref 0–1)
BUN SERPL-MCNC: 25 MG/DL (ref 7–20)
CALCIUM SERPL-MCNC: 9.4 MG/DL (ref 8.3–10.6)
CHLORIDE SERPL-SCNC: 103 MMOL/L (ref 99–110)
CHOLEST SERPL-MCNC: 167 MG/DL (ref 0–199)
CK SERPL-CCNC: 67 U/L (ref 26–192)
CO2 SERPL-SCNC: 28 MMOL/L (ref 21–32)
CREAT SERPL-MCNC: 1.3 MG/DL (ref 0.6–1.2)
DEPRECATED RDW RBC AUTO: 13.7 % (ref 12.4–15.4)
EOSINOPHIL # BLD: 0.2 K/UL (ref 0–0.6)
EOSINOPHIL NFR BLD: 1.7 %
GFR SERPLBLD CREATININE-BSD FMLA CKD-EPI: 42 ML/MIN/{1.73_M2}
GLUCOSE SERPL-MCNC: 111 MG/DL (ref 70–99)
HCT VFR BLD AUTO: 36.5 % (ref 36–48)
HDLC SERPL-MCNC: 48 MG/DL (ref 40–60)
HGB BLD-MCNC: 12.8 G/DL (ref 12–16)
LDLC SERPL CALC-MCNC: 78 MG/DL
LYMPHOCYTES # BLD: 1.5 K/UL (ref 1–5.1)
LYMPHOCYTES NFR BLD: 14.2 %
MCH RBC QN AUTO: 32.4 PG (ref 26–34)
MCHC RBC AUTO-ENTMCNC: 35.2 G/DL (ref 31–36)
MCV RBC AUTO: 92.1 FL (ref 80–100)
MONOCYTES # BLD: 0.4 K/UL (ref 0–1.3)
MONOCYTES NFR BLD: 4.1 %
NEUTROPHILS # BLD: 8.4 K/UL (ref 1.7–7.7)
NEUTROPHILS NFR BLD: 79.5 %
PLATELET # BLD AUTO: 290 K/UL (ref 135–450)
PMV BLD AUTO: 7.7 FL (ref 5–10.5)
POTASSIUM SERPL-SCNC: 4.6 MMOL/L (ref 3.5–5.1)
PROT SERPL-MCNC: 6.8 G/DL (ref 6.4–8.2)
RBC # BLD AUTO: 3.97 M/UL (ref 4–5.2)
SODIUM SERPL-SCNC: 140 MMOL/L (ref 136–145)
TRIGL SERPL-MCNC: 203 MG/DL (ref 0–150)
VLDLC SERPL CALC-MCNC: 41 MG/DL
WBC # BLD AUTO: 10.5 K/UL (ref 4–11)

## 2023-07-18 PROCEDURE — 3074F SYST BP LT 130 MM HG: CPT | Performed by: FAMILY MEDICINE

## 2023-07-18 PROCEDURE — 3078F DIAST BP <80 MM HG: CPT | Performed by: FAMILY MEDICINE

## 2023-07-18 PROCEDURE — G0439 PPPS, SUBSEQ VISIT: HCPCS | Performed by: FAMILY MEDICINE

## 2023-07-18 PROCEDURE — 3044F HG A1C LEVEL LT 7.0%: CPT | Performed by: FAMILY MEDICINE

## 2023-07-18 PROCEDURE — 1123F ACP DISCUSS/DSCN MKR DOCD: CPT | Performed by: FAMILY MEDICINE

## 2023-07-18 ASSESSMENT — PATIENT HEALTH QUESTIONNAIRE - PHQ9
SUM OF ALL RESPONSES TO PHQ QUESTIONS 1-9: 3
SUM OF ALL RESPONSES TO PHQ QUESTIONS 1-9: 3
6. FEELING BAD ABOUT YOURSELF - OR THAT YOU ARE A FAILURE OR HAVE LET YOURSELF OR YOUR FAMILY DOWN: 0
3. TROUBLE FALLING OR STAYING ASLEEP: 1
4. FEELING TIRED OR HAVING LITTLE ENERGY: 0
2. FEELING DOWN, DEPRESSED OR HOPELESS: 1
10. IF YOU CHECKED OFF ANY PROBLEMS, HOW DIFFICULT HAVE THESE PROBLEMS MADE IT FOR YOU TO DO YOUR WORK, TAKE CARE OF THINGS AT HOME, OR GET ALONG WITH OTHER PEOPLE: 0
1. LITTLE INTEREST OR PLEASURE IN DOING THINGS: 1
7. TROUBLE CONCENTRATING ON THINGS, SUCH AS READING THE NEWSPAPER OR WATCHING TELEVISION: 0
8. MOVING OR SPEAKING SO SLOWLY THAT OTHER PEOPLE COULD HAVE NOTICED. OR THE OPPOSITE, BEING SO FIGETY OR RESTLESS THAT YOU HAVE BEEN MOVING AROUND A LOT MORE THAN USUAL: 0
5. POOR APPETITE OR OVEREATING: 0
SUM OF ALL RESPONSES TO PHQ9 QUESTIONS 1 & 2: 2
9. THOUGHTS THAT YOU WOULD BE BETTER OFF DEAD, OR OF HURTING YOURSELF: 0
SUM OF ALL RESPONSES TO PHQ QUESTIONS 1-9: 3
SUM OF ALL RESPONSES TO PHQ QUESTIONS 1-9: 3

## 2023-07-19 LAB
EST. AVERAGE GLUCOSE BLD GHB EST-MCNC: 93.9 MG/DL
HBA1C MFR BLD: 4.9 %

## 2023-07-20 NOTE — PATIENT INSTRUCTIONS
Personalized Preventive Plan for Naif Gonzales - 7/18/2023  Medicare offers a range of preventive health benefits. Some of the tests and screenings are paid in full while other may be subject to a deductible, co-insurance, and/or copay. Some of these benefits include a comprehensive review of your medical history including lifestyle, illnesses that may run in your family, and various assessments and screenings as appropriate. After reviewing your medical record and screening and assessments performed today your provider may have ordered immunizations, labs, imaging, and/or referrals for you. A list of these orders (if applicable) as well as your Preventive Care list are included within your After Visit Summary for your review. Other Preventive Recommendations:    A preventive eye exam performed by an eye specialist is recommended every 1-2 years to screen for glaucoma; cataracts, macular degeneration, and other eye disorders. A preventive dental visit is recommended every 6 months. Try to get at least 150 minutes of exercise per week or 10,000 steps per day on a pedometer . Order or download the FREE \"Exercise & Physical Activity: Your Everyday Guide\" from [employer]. Call 3-291.806.1850 or search [employer] online. You need 6296-8275 mg of calcium and 1277-6598 IU of vitamin D per day. It is possible to meet your calcium requirement with diet alone, but a vitamin D supplement is usually necessary to meet this goal.  When exposed to the sun, use a sunscreen that protects against both UVA and UVB radiation with an SPF of 30 or greater. Reapply every 2 to 3 hours or after sweating, drying off with a towel, or swimming. Always wear a seat belt when traveling in a car. Always wear a helmet when riding a bicycle or motorcycle.

## 2023-07-24 RX ORDER — SERTRALINE HYDROCHLORIDE 100 MG/1
TABLET, FILM COATED ORAL
Qty: 135 TABLET | Refills: 0 | OUTPATIENT
Start: 2023-07-24

## 2023-07-31 RX ORDER — PRAVASTATIN SODIUM 40 MG
TABLET ORAL
Qty: 90 TABLET | Refills: 0 | OUTPATIENT
Start: 2023-07-31

## 2023-07-31 RX ORDER — LISINOPRIL 10 MG/1
TABLET ORAL
Qty: 90 TABLET | Refills: 0 | OUTPATIENT
Start: 2023-07-31

## 2023-07-31 RX ORDER — BISOPROLOL FUMARATE AND HYDROCHLOROTHIAZIDE 10; 6.25 MG/1; MG/1
TABLET ORAL
Qty: 90 TABLET | Refills: 0 | OUTPATIENT
Start: 2023-07-31

## 2023-09-13 RX ORDER — LISINOPRIL 10 MG/1
TABLET ORAL
Qty: 90 TABLET | Refills: 0 | OUTPATIENT
Start: 2023-09-13

## 2023-09-13 RX ORDER — BISOPROLOL FUMARATE AND HYDROCHLOROTHIAZIDE 10; 6.25 MG/1; MG/1
TABLET ORAL
Qty: 90 TABLET | Refills: 0 | OUTPATIENT
Start: 2023-09-13

## 2023-09-13 RX ORDER — PRAVASTATIN SODIUM 40 MG
TABLET ORAL
Qty: 90 TABLET | Refills: 0 | OUTPATIENT
Start: 2023-09-13

## 2023-09-13 RX ORDER — SERTRALINE HYDROCHLORIDE 100 MG/1
TABLET, FILM COATED ORAL
Qty: 135 TABLET | Refills: 0 | OUTPATIENT
Start: 2023-09-13

## 2023-09-15 ENCOUNTER — OFFICE VISIT (OUTPATIENT)
Dept: FAMILY MEDICINE CLINIC | Age: 78
End: 2023-09-15
Payer: MEDICARE

## 2023-09-15 VITALS
WEIGHT: 178 LBS | DIASTOLIC BLOOD PRESSURE: 70 MMHG | HEART RATE: 51 BPM | SYSTOLIC BLOOD PRESSURE: 130 MMHG | OXYGEN SATURATION: 94 % | BODY MASS INDEX: 28.73 KG/M2

## 2023-09-15 DIAGNOSIS — I10 ESSENTIAL HYPERTENSION: ICD-10-CM

## 2023-09-15 DIAGNOSIS — M85.80 OSTEOPENIA, UNSPECIFIED LOCATION: ICD-10-CM

## 2023-09-15 DIAGNOSIS — F33.0 MAJOR DEPRESSIVE DISORDER, RECURRENT, MILD (HCC): ICD-10-CM

## 2023-09-15 DIAGNOSIS — H35.30 MACULAR DEGENERATION OF BOTH EYES, UNSPECIFIED TYPE: ICD-10-CM

## 2023-09-15 DIAGNOSIS — E11.22 TYPE 2 DIABETES MELLITUS WITH CHRONIC KIDNEY DISEASE, WITHOUT LONG-TERM CURRENT USE OF INSULIN, UNSPECIFIED CKD STAGE (HCC): Primary | ICD-10-CM

## 2023-09-15 DIAGNOSIS — N18.30 STAGE 3 CHRONIC KIDNEY DISEASE, UNSPECIFIED WHETHER STAGE 3A OR 3B CKD (HCC): ICD-10-CM

## 2023-09-15 DIAGNOSIS — E78.5 HYPERLIPIDEMIA, UNSPECIFIED HYPERLIPIDEMIA TYPE: ICD-10-CM

## 2023-09-15 PROCEDURE — 3078F DIAST BP <80 MM HG: CPT | Performed by: FAMILY MEDICINE

## 2023-09-15 PROCEDURE — 99214 OFFICE O/P EST MOD 30 MIN: CPT | Performed by: FAMILY MEDICINE

## 2023-09-15 PROCEDURE — 1123F ACP DISCUSS/DSCN MKR DOCD: CPT | Performed by: FAMILY MEDICINE

## 2023-09-15 PROCEDURE — 3075F SYST BP GE 130 - 139MM HG: CPT | Performed by: FAMILY MEDICINE

## 2023-09-15 PROCEDURE — 3044F HG A1C LEVEL LT 7.0%: CPT | Performed by: FAMILY MEDICINE

## 2023-09-15 RX ORDER — LISINOPRIL 10 MG/1
TABLET ORAL
Qty: 90 TABLET | Refills: 0 | Status: SHIPPED | OUTPATIENT
Start: 2023-09-15

## 2023-09-15 RX ORDER — BISOPROLOL FUMARATE AND HYDROCHLOROTHIAZIDE 10; 6.25 MG/1; MG/1
TABLET ORAL
Qty: 90 TABLET | Refills: 0 | Status: SHIPPED | OUTPATIENT
Start: 2023-09-15

## 2023-09-15 RX ORDER — SERTRALINE HYDROCHLORIDE 100 MG/1
TABLET, FILM COATED ORAL
Qty: 135 TABLET | Refills: 0 | Status: SHIPPED | OUTPATIENT
Start: 2023-09-15

## 2023-09-15 RX ORDER — PRAVASTATIN SODIUM 40 MG
TABLET ORAL
Qty: 90 TABLET | Refills: 0 | Status: SHIPPED | OUTPATIENT
Start: 2023-09-15

## 2023-09-15 ASSESSMENT — PATIENT HEALTH QUESTIONNAIRE - PHQ9
10. IF YOU CHECKED OFF ANY PROBLEMS, HOW DIFFICULT HAVE THESE PROBLEMS MADE IT FOR YOU TO DO YOUR WORK, TAKE CARE OF THINGS AT HOME, OR GET ALONG WITH OTHER PEOPLE: 0
8. MOVING OR SPEAKING SO SLOWLY THAT OTHER PEOPLE COULD HAVE NOTICED. OR THE OPPOSITE, BEING SO FIGETY OR RESTLESS THAT YOU HAVE BEEN MOVING AROUND A LOT MORE THAN USUAL: 0
SUM OF ALL RESPONSES TO PHQ9 QUESTIONS 1 & 2: 0
9. THOUGHTS THAT YOU WOULD BE BETTER OFF DEAD, OR OF HURTING YOURSELF: 0
SUM OF ALL RESPONSES TO PHQ QUESTIONS 1-9: 0
4. FEELING TIRED OR HAVING LITTLE ENERGY: 0
SUM OF ALL RESPONSES TO PHQ QUESTIONS 1-9: 0
6. FEELING BAD ABOUT YOURSELF - OR THAT YOU ARE A FAILURE OR HAVE LET YOURSELF OR YOUR FAMILY DOWN: 0
3. TROUBLE FALLING OR STAYING ASLEEP: 0
SUM OF ALL RESPONSES TO PHQ QUESTIONS 1-9: 0
7. TROUBLE CONCENTRATING ON THINGS, SUCH AS READING THE NEWSPAPER OR WATCHING TELEVISION: 0
SUM OF ALL RESPONSES TO PHQ QUESTIONS 1-9: 0
5. POOR APPETITE OR OVEREATING: 0
1. LITTLE INTEREST OR PLEASURE IN DOING THINGS: 0
2. FEELING DOWN, DEPRESSED OR HOPELESS: 0

## 2023-09-15 ASSESSMENT — ENCOUNTER SYMPTOMS
SHORTNESS OF BREATH: 0
BLOOD IN STOOL: 0
ABDOMINAL PAIN: 0
COUGH: 0
CHEST TIGHTNESS: 0

## 2023-09-15 NOTE — PROGRESS NOTES
ASSESSMENT/PLAN:    1. Type 2 diabetes mellitus with chronic kidney disease, without long-term current use of insulin, unspecified CKD stage ([address])  Reviewed last hemoglobin A1c    Continue with current management and refill meds    Ambulatory blood sugar checks  Increase physical activity as tolerated    2. Hyperlipidemia, unspecified hyperlipidemia type  Reviewed and discussed last cholesterol labs  Continue refill meds  Labs on follow-up    3. Essential hypertension  Continue with current management  Ambulatory blood pressure checks  DASH diet reminder reviewed    4. Major depressive disorder, recurrent, mild  Stress management. Continue and refill Zoloft    5. Osteopenia, unspecified location  Reviewed and discussed last DEXA scan    Increase dietary calcium and daily vitamin D    6. Stage 3 chronic kidney disease, unspecified whether stage 3a or 3b CKD ([address])  Continue follow-up with nephrology    COVID-19 booster and flu vaccine advised  No orders of the defined types were placed in this encounter. Current Outpatient Medications   Medication Sig Dispense Refill    lisinopril (PRINIVIL;ZESTRIL) 10 MG tablet TAKE ONE TABLET BY MOUTH DAILY 90 tablet 0    pravastatin (PRAVACHOL) 40 MG tablet TAKE ONE TABLET BY MOUTH DAILY 90 tablet 0    sertraline (ZOLOFT) 100 MG tablet 1 1/2 po daily 135 tablet 0    bisoprolol-hydroCHLOROthiazide (ZIAC) 10-6.25 MG per tablet TAKE ONE TABLET BY MOUTH DAILY 90 tablet 0    Cholecalciferol (VITAMIN D-1000 MAX ST PO) Take by mouth      bevacizumab (AVASTIN) 100 MG/4ML chemo injection inject 0.05mL intraocularly in affected eye. Handicap Placard MISC by Does not apply route Osteoarthritis, knee pain  1/2019-1/24 (Patient not taking: No sig reported) 1 each 0     No current facility-administered medications for this visit. Return in about 3 months (around 12/15/2023), or if symptoms worsen or fail to improve, for hyperlipidemia.     Moraima Silvestre MD, MD

## 2023-09-19 DIAGNOSIS — E78.5 HYPERLIPIDEMIA, UNSPECIFIED HYPERLIPIDEMIA TYPE: ICD-10-CM

## 2023-09-19 DIAGNOSIS — E11.22 TYPE 2 DIABETES MELLITUS WITH CHRONIC KIDNEY DISEASE, WITHOUT LONG-TERM CURRENT USE OF INSULIN, UNSPECIFIED CKD STAGE (HCC): ICD-10-CM

## 2023-09-19 LAB
ALBUMIN SERPL-MCNC: 4.4 G/DL (ref 3.4–5)
ALBUMIN/GLOB SERPL: 2 {RATIO} (ref 1.1–2.2)
ALP SERPL-CCNC: 114 U/L (ref 40–129)
ALT SERPL-CCNC: 10 U/L (ref 10–40)
ANION GAP SERPL CALCULATED.3IONS-SCNC: 8 MMOL/L (ref 3–16)
AST SERPL-CCNC: 15 U/L (ref 15–37)
BASOPHILS # BLD: 0.1 K/UL (ref 0–0.2)
BASOPHILS NFR BLD: 0.6 %
BILIRUB SERPL-MCNC: 0.6 MG/DL (ref 0–1)
BUN SERPL-MCNC: 28 MG/DL (ref 7–20)
CALCIUM SERPL-MCNC: 9 MG/DL (ref 8.3–10.6)
CHLORIDE SERPL-SCNC: 103 MMOL/L (ref 99–110)
CHOLEST SERPL-MCNC: 171 MG/DL (ref 0–199)
CK SERPL-CCNC: 38 U/L (ref 26–192)
CO2 SERPL-SCNC: 28 MMOL/L (ref 21–32)
CREAT SERPL-MCNC: 1.2 MG/DL (ref 0.6–1.2)
DEPRECATED RDW RBC AUTO: 14.4 % (ref 12.4–15.4)
EOSINOPHIL # BLD: 0.2 K/UL (ref 0–0.6)
EOSINOPHIL NFR BLD: 1.9 %
GFR SERPLBLD CREATININE-BSD FMLA CKD-EPI: 46 ML/MIN/{1.73_M2}
GLUCOSE SERPL-MCNC: 86 MG/DL (ref 70–99)
HCT VFR BLD AUTO: 35.6 % (ref 36–48)
HDLC SERPL-MCNC: 60 MG/DL (ref 40–60)
HGB BLD-MCNC: 12.4 G/DL (ref 12–16)
LDLC SERPL CALC-MCNC: 87 MG/DL
LYMPHOCYTES # BLD: 1.1 K/UL (ref 1–5.1)
LYMPHOCYTES NFR BLD: 12.6 %
MCH RBC QN AUTO: 32.5 PG (ref 26–34)
MCHC RBC AUTO-ENTMCNC: 34.8 G/DL (ref 31–36)
MCV RBC AUTO: 93.5 FL (ref 80–100)
MONOCYTES # BLD: 0.3 K/UL (ref 0–1.3)
MONOCYTES NFR BLD: 3.9 %
NEUTROPHILS # BLD: 7 K/UL (ref 1.7–7.7)
NEUTROPHILS NFR BLD: 81 %
PLATELET # BLD AUTO: 275 K/UL (ref 135–450)
PMV BLD AUTO: 7.8 FL (ref 5–10.5)
POTASSIUM SERPL-SCNC: 4.6 MMOL/L (ref 3.5–5.1)
PROT SERPL-MCNC: 6.6 G/DL (ref 6.4–8.2)
RBC # BLD AUTO: 3.81 M/UL (ref 4–5.2)
SODIUM SERPL-SCNC: 139 MMOL/L (ref 136–145)
TRIGL SERPL-MCNC: 118 MG/DL (ref 0–150)
VLDLC SERPL CALC-MCNC: 24 MG/DL
WBC # BLD AUTO: 8.7 K/UL (ref 4–11)

## 2023-09-20 LAB
EST. AVERAGE GLUCOSE BLD GHB EST-MCNC: 91.1 MG/DL
HBA1C MFR BLD: 4.8 %

## 2023-12-11 ENCOUNTER — OFFICE VISIT (OUTPATIENT)
Dept: FAMILY MEDICINE CLINIC | Age: 78
End: 2023-12-11
Payer: MEDICARE

## 2023-12-11 VITALS
DIASTOLIC BLOOD PRESSURE: 70 MMHG | HEART RATE: 50 BPM | TEMPERATURE: 97 F | HEIGHT: 64 IN | OXYGEN SATURATION: 97 % | SYSTOLIC BLOOD PRESSURE: 130 MMHG | BODY MASS INDEX: 30.01 KG/M2 | WEIGHT: 175.8 LBS

## 2023-12-11 DIAGNOSIS — E11.22 TYPE 2 DIABETES MELLITUS WITH CHRONIC KIDNEY DISEASE, WITHOUT LONG-TERM CURRENT USE OF INSULIN, UNSPECIFIED CKD STAGE (HCC): Primary | ICD-10-CM

## 2023-12-11 DIAGNOSIS — F33.0 MAJOR DEPRESSIVE DISORDER, RECURRENT, MILD (HCC): ICD-10-CM

## 2023-12-11 DIAGNOSIS — N18.30 STAGE 3 CHRONIC KIDNEY DISEASE, UNSPECIFIED WHETHER STAGE 3A OR 3B CKD (HCC): ICD-10-CM

## 2023-12-11 DIAGNOSIS — I10 ESSENTIAL HYPERTENSION: ICD-10-CM

## 2023-12-11 DIAGNOSIS — E78.5 HYPERLIPIDEMIA, UNSPECIFIED HYPERLIPIDEMIA TYPE: ICD-10-CM

## 2023-12-11 DIAGNOSIS — H35.30 MACULAR DEGENERATION OF BOTH EYES, UNSPECIFIED TYPE: ICD-10-CM

## 2023-12-11 LAB
BILIRUBIN, POC: NEGATIVE
BLOOD URINE, POC: NEGATIVE
CHP ED QC CHECK: NORMAL
CLARITY, POC: CLEAR
COLOR, POC: YELLOW
GLUCOSE BLD-MCNC: 110 MG/DL
GLUCOSE URINE, POC: NEGATIVE
KETONES, POC: NEGATIVE
LEUKOCYTE EST, POC: NEGATIVE
NITRITE, POC: NEGATIVE
PH, POC: 5.5
PROTEIN, POC: NEGATIVE
SPECIFIC GRAVITY, POC: 1.01
UROBILINOGEN, POC: 0.2

## 2023-12-11 PROCEDURE — 1123F ACP DISCUSS/DSCN MKR DOCD: CPT | Performed by: FAMILY MEDICINE

## 2023-12-11 PROCEDURE — 3075F SYST BP GE 130 - 139MM HG: CPT | Performed by: FAMILY MEDICINE

## 2023-12-11 PROCEDURE — 3044F HG A1C LEVEL LT 7.0%: CPT | Performed by: FAMILY MEDICINE

## 2023-12-11 PROCEDURE — 90694 VACC AIIV4 NO PRSRV 0.5ML IM: CPT | Performed by: FAMILY MEDICINE

## 2023-12-11 PROCEDURE — 3078F DIAST BP <80 MM HG: CPT | Performed by: FAMILY MEDICINE

## 2023-12-11 PROCEDURE — 82962 GLUCOSE BLOOD TEST: CPT | Performed by: FAMILY MEDICINE

## 2023-12-11 PROCEDURE — 99214 OFFICE O/P EST MOD 30 MIN: CPT | Performed by: FAMILY MEDICINE

## 2023-12-11 PROCEDURE — G0008 ADMIN INFLUENZA VIRUS VAC: HCPCS | Performed by: FAMILY MEDICINE

## 2023-12-11 PROCEDURE — 81002 URINALYSIS NONAUTO W/O SCOPE: CPT | Performed by: FAMILY MEDICINE

## 2023-12-11 RX ORDER — LISINOPRIL 10 MG/1
TABLET ORAL
Qty: 90 TABLET | Refills: 0 | Status: SHIPPED | OUTPATIENT
Start: 2023-12-11

## 2023-12-11 RX ORDER — BISOPROLOL FUMARATE AND HYDROCHLOROTHIAZIDE 10; 6.25 MG/1; MG/1
TABLET ORAL
Qty: 90 TABLET | Refills: 0 | Status: SHIPPED | OUTPATIENT
Start: 2023-12-11

## 2023-12-11 RX ORDER — PRAVASTATIN SODIUM 40 MG
TABLET ORAL
Qty: 90 TABLET | Refills: 0 | Status: SHIPPED | OUTPATIENT
Start: 2023-12-11

## 2023-12-11 RX ORDER — SERTRALINE HYDROCHLORIDE 100 MG/1
TABLET, FILM COATED ORAL
Qty: 135 TABLET | Refills: 0 | Status: SHIPPED | OUTPATIENT
Start: 2023-12-11

## 2023-12-11 SDOH — ECONOMIC STABILITY: FOOD INSECURITY: WITHIN THE PAST 12 MONTHS, THE FOOD YOU BOUGHT JUST DIDN'T LAST AND YOU DIDN'T HAVE MONEY TO GET MORE.: NEVER TRUE

## 2023-12-11 SDOH — ECONOMIC STABILITY: HOUSING INSECURITY
IN THE LAST 12 MONTHS, WAS THERE A TIME WHEN YOU DID NOT HAVE A STEADY PLACE TO SLEEP OR SLEPT IN A SHELTER (INCLUDING NOW)?: NO

## 2023-12-11 SDOH — ECONOMIC STABILITY: FOOD INSECURITY: WITHIN THE PAST 12 MONTHS, YOU WORRIED THAT YOUR FOOD WOULD RUN OUT BEFORE YOU GOT MONEY TO BUY MORE.: NEVER TRUE

## 2023-12-11 SDOH — ECONOMIC STABILITY: INCOME INSECURITY: HOW HARD IS IT FOR YOU TO PAY FOR THE VERY BASICS LIKE FOOD, HOUSING, MEDICAL CARE, AND HEATING?: NOT HARD AT ALL

## 2023-12-11 ASSESSMENT — ENCOUNTER SYMPTOMS
BLOOD IN STOOL: 0
DIARRHEA: 0
CHEST TIGHTNESS: 0
COUGH: 0
CONSTIPATION: 0
ABDOMINAL PAIN: 0
SHORTNESS OF BREATH: 0

## 2023-12-11 NOTE — PROGRESS NOTES
SUBJECTIVE:  Erroll Rain   1945   female   No Known Allergies    Chief Complaint   Patient presents with    Medication Refill        Patient Active Problem List    Diagnosis Date Noted    Major depressive disorder, recurrent, mild 11/11/2022     Priority: Medium    Major depressive disorder, recurrent, moderate 11/11/2022     Priority: Medium    Major depressive disorder, recurrent, unspecified 11/11/2022     Priority: Medium    CKD (chronic kidney disease), stage III ([address]) 02/04/2020    Gout 02/04/2020     Formatting of this note might be different from the original.  off allopurinol      Vitamin D deficiency 02/04/2020    HTN (hypertension) 02/04/2020    DM2 (diabetes mellitus, type 2) ([address]) 02/04/2020     Formatting of this note might be different from the original.  controlled her HgA1C was 6.      10/21/19 RIGHT TKA 10/21/2019    Encounter for postoperative care 10/21/2019    Prerenal azotemia 10/21/2019    Primary osteoarthritis of left knee 01/02/2019    Idiopathic chronic gout of left foot without tophus 10/31/2017    Type 2 diabetes mellitus with complication, without long-term current use of insulin ([address]) 08/07/2016    Depression 03/04/2015    Ventral hernia 06/20/2014    Morbid obesity ([address]) 06/20/2014    Macular degeneration 06/20/2014    Hypertension     Osteopenia     Hyperlipidemia     Squamous cell carcinoma of skin 11/16/2011       HPI   Patient is here today for follow-up on diabetes, hyperlipidemia, hypertension, depression, chronic renal disease and macular degeneration. The patient is currently followed by nephrology for chronic renal disease and has been stable on current treatment. Denies chest pain, shortness of breath or myalgias. Denies headache change in vision or any neurologic symptoms. She does have macular degeneration and followed by ophthalmology and being treated at this time. Patient denies symptoms of depression or anxiety. Sleeping well.   Would like to continue

## 2023-12-12 DIAGNOSIS — E78.5 HYPERLIPIDEMIA, UNSPECIFIED HYPERLIPIDEMIA TYPE: ICD-10-CM

## 2023-12-12 DIAGNOSIS — E11.22 TYPE 2 DIABETES MELLITUS WITH CHRONIC KIDNEY DISEASE, WITHOUT LONG-TERM CURRENT USE OF INSULIN, UNSPECIFIED CKD STAGE (HCC): ICD-10-CM

## 2023-12-12 LAB
ALBUMIN SERPL-MCNC: 4.6 G/DL (ref 3.4–5)
ALBUMIN/GLOB SERPL: 2.3 {RATIO} (ref 1.1–2.2)
ALP SERPL-CCNC: 116 U/L (ref 40–129)
ALT SERPL-CCNC: 8 U/L (ref 10–40)
ANION GAP SERPL CALCULATED.3IONS-SCNC: 10 MMOL/L (ref 3–16)
AST SERPL-CCNC: 15 U/L (ref 15–37)
BASOPHILS # BLD: 0 K/UL (ref 0–0.2)
BASOPHILS NFR BLD: 0.4 %
BILIRUB SERPL-MCNC: 0.5 MG/DL (ref 0–1)
BUN SERPL-MCNC: 37 MG/DL (ref 7–20)
CALCIUM SERPL-MCNC: 9 MG/DL (ref 8.3–10.6)
CHLORIDE SERPL-SCNC: 103 MMOL/L (ref 99–110)
CHOLEST SERPL-MCNC: 159 MG/DL (ref 0–199)
CK SERPL-CCNC: 32 U/L (ref 26–192)
CO2 SERPL-SCNC: 27 MMOL/L (ref 21–32)
CREAT SERPL-MCNC: 1.1 MG/DL (ref 0.6–1.2)
DEPRECATED RDW RBC AUTO: 13.9 % (ref 12.4–15.4)
EOSINOPHIL # BLD: 0.1 K/UL (ref 0–0.6)
EOSINOPHIL NFR BLD: 1.4 %
GFR SERPLBLD CREATININE-BSD FMLA CKD-EPI: 51 ML/MIN/{1.73_M2}
GLUCOSE SERPL-MCNC: 93 MG/DL (ref 70–99)
HCT VFR BLD AUTO: 37.6 % (ref 36–48)
HDLC SERPL-MCNC: 51 MG/DL (ref 40–60)
HGB BLD-MCNC: 12.8 G/DL (ref 12–16)
LDLC SERPL CALC-MCNC: 75 MG/DL
LYMPHOCYTES # BLD: 1 K/UL (ref 1–5.1)
LYMPHOCYTES NFR BLD: 10.7 %
MCH RBC QN AUTO: 32 PG (ref 26–34)
MCHC RBC AUTO-ENTMCNC: 34 G/DL (ref 31–36)
MCV RBC AUTO: 94.1 FL (ref 80–100)
MONOCYTES # BLD: 0.6 K/UL (ref 0–1.3)
MONOCYTES NFR BLD: 5.9 %
NEUTROPHILS # BLD: 7.9 K/UL (ref 1.7–7.7)
NEUTROPHILS NFR BLD: 81.6 %
PLATELET # BLD AUTO: 240 K/UL (ref 135–450)
PMV BLD AUTO: 8.2 FL (ref 5–10.5)
POTASSIUM SERPL-SCNC: 5 MMOL/L (ref 3.5–5.1)
PROT SERPL-MCNC: 6.6 G/DL (ref 6.4–8.2)
RBC # BLD AUTO: 3.99 M/UL (ref 4–5.2)
SODIUM SERPL-SCNC: 140 MMOL/L (ref 136–145)
TRIGL SERPL-MCNC: 163 MG/DL (ref 0–150)
VLDLC SERPL CALC-MCNC: 33 MG/DL
WBC # BLD AUTO: 9.7 K/UL (ref 4–11)

## 2023-12-13 LAB
EST. AVERAGE GLUCOSE BLD GHB EST-MCNC: 91.1 MG/DL
HBA1C MFR BLD: 4.8 %

## 2024-03-05 ENCOUNTER — TELEPHONE (OUTPATIENT)
Dept: FAMILY MEDICINE CLINIC | Age: 79
End: 2024-03-05

## 2024-03-05 DIAGNOSIS — E78.5 HYPERLIPIDEMIA, UNSPECIFIED HYPERLIPIDEMIA TYPE: ICD-10-CM

## 2024-03-05 DIAGNOSIS — E11.22 TYPE 2 DIABETES MELLITUS WITH CHRONIC KIDNEY DISEASE, WITHOUT LONG-TERM CURRENT USE OF INSULIN, UNSPECIFIED CKD STAGE (HCC): Primary | ICD-10-CM

## 2024-03-05 NOTE — TELEPHONE ENCOUNTER
----- Message from Layla Neal sent at 3/5/2024  2:17 PM EST -----  Subject: Referral Request    Reason for referral request? Blood work, routine  Provider patient wants to be referred to(if known):     Provider Phone Number(if known):    Additional Information for Provider? Please call when the orders are   ready, Pt has moved and plans on having her blood drawn at a different   [facility] laboratory. Appt 03/11/2024  ---------------------------------------------------------------------------  --------------  CALL BACK INFO    [phone]; OK to leave message on voicemail  ---------------------------------------------------------------------------  --------------

## 2024-03-05 NOTE — TELEPHONE ENCOUNTER
----- Message from Layla Neal sent at 3/5/2024  2:17 PM EST -----  Subject: Referral Request    Reason for referral request? Blood work, routine  Provider patient wants to be referred to(if known):     Provider Phone Number(if known):    Additional Information for Provider? Please call when the orders are   ready, Pt has moved and plans on having her blood drawn at a different   [facility] laboratory. Appt 03/11/2024  ---------------------------------------------------------------------------  --------------  CALL BACK INFO    [phone]; OK to leave message on voicemail  ---------------------------------------------------------------------------  --------------

## 2024-03-08 RX ORDER — PRAVASTATIN SODIUM 40 MG
TABLET ORAL
Qty: 90 TABLET | Refills: 0 | OUTPATIENT
Start: 2024-03-08

## 2024-03-08 RX ORDER — LISINOPRIL 10 MG/1
TABLET ORAL
Qty: 90 TABLET | Refills: 0 | OUTPATIENT
Start: 2024-03-08

## 2024-03-08 RX ORDER — SERTRALINE HYDROCHLORIDE 100 MG/1
TABLET, FILM COATED ORAL
Qty: 135 TABLET | Refills: 0 | OUTPATIENT
Start: 2024-03-08

## 2024-03-11 ENCOUNTER — OFFICE VISIT (OUTPATIENT)
Dept: FAMILY MEDICINE CLINIC | Age: 79
End: 2024-03-11
Payer: MEDICARE

## 2024-03-11 VITALS — DIASTOLIC BLOOD PRESSURE: 78 MMHG | SYSTOLIC BLOOD PRESSURE: 140 MMHG

## 2024-03-11 VITALS
BODY MASS INDEX: 28.34 KG/M2 | SYSTOLIC BLOOD PRESSURE: 140 MMHG | OXYGEN SATURATION: 98 % | HEART RATE: 78 BPM | DIASTOLIC BLOOD PRESSURE: 78 MMHG | HEIGHT: 64 IN | WEIGHT: 166 LBS

## 2024-03-11 DIAGNOSIS — H35.30 MACULAR DEGENERATION OF BOTH EYES, UNSPECIFIED TYPE: ICD-10-CM

## 2024-03-11 DIAGNOSIS — N18.30 STAGE 3 CHRONIC KIDNEY DISEASE, UNSPECIFIED WHETHER STAGE 3A OR 3B CKD (HCC): ICD-10-CM

## 2024-03-11 DIAGNOSIS — I10 ESSENTIAL HYPERTENSION: ICD-10-CM

## 2024-03-11 DIAGNOSIS — E78.5 HYPERLIPIDEMIA, UNSPECIFIED HYPERLIPIDEMIA TYPE: ICD-10-CM

## 2024-03-11 DIAGNOSIS — E11.22 TYPE 2 DIABETES MELLITUS WITH CHRONIC KIDNEY DISEASE, WITHOUT LONG-TERM CURRENT USE OF INSULIN, UNSPECIFIED CKD STAGE (HCC): Primary | ICD-10-CM

## 2024-03-11 DIAGNOSIS — Z00.00 MEDICARE ANNUAL WELLNESS VISIT, SUBSEQUENT: Primary | ICD-10-CM

## 2024-03-11 DIAGNOSIS — F33.1 MAJOR DEPRESSIVE DISORDER, RECURRENT, MODERATE (HCC): ICD-10-CM

## 2024-03-11 PROCEDURE — 82962 GLUCOSE BLOOD TEST: CPT | Performed by: FAMILY MEDICINE

## 2024-03-11 PROCEDURE — 1123F ACP DISCUSS/DSCN MKR DOCD: CPT | Performed by: FAMILY MEDICINE

## 2024-03-11 PROCEDURE — 3078F DIAST BP <80 MM HG: CPT | Performed by: FAMILY MEDICINE

## 2024-03-11 PROCEDURE — 99214 OFFICE O/P EST MOD 30 MIN: CPT | Performed by: FAMILY MEDICINE

## 2024-03-11 PROCEDURE — 3077F SYST BP >= 140 MM HG: CPT | Performed by: FAMILY MEDICINE

## 2024-03-11 PROCEDURE — 3044F HG A1C LEVEL LT 7.0%: CPT | Performed by: FAMILY MEDICINE

## 2024-03-11 PROCEDURE — G0439 PPPS, SUBSEQ VISIT: HCPCS | Performed by: FAMILY MEDICINE

## 2024-03-11 PROCEDURE — 81002 URINALYSIS NONAUTO W/O SCOPE: CPT | Performed by: FAMILY MEDICINE

## 2024-03-11 RX ORDER — PRAVASTATIN SODIUM 40 MG
TABLET ORAL
Qty: 90 TABLET | Refills: 0 | Status: SHIPPED | OUTPATIENT
Start: 2024-03-11

## 2024-03-11 RX ORDER — BISOPROLOL FUMARATE AND HYDROCHLOROTHIAZIDE 10; 6.25 MG/1; MG/1
TABLET ORAL
Qty: 90 TABLET | Refills: 0 | Status: SHIPPED | OUTPATIENT
Start: 2024-03-11

## 2024-03-11 RX ORDER — LISINOPRIL 10 MG/1
TABLET ORAL
Qty: 90 TABLET | Refills: 0 | Status: SHIPPED | OUTPATIENT
Start: 2024-03-11

## 2024-03-11 RX ORDER — SERTRALINE HYDROCHLORIDE 100 MG/1
TABLET, FILM COATED ORAL
Qty: 135 TABLET | Refills: 0 | Status: SHIPPED | OUTPATIENT
Start: 2024-03-11

## 2024-03-11 ASSESSMENT — LIFESTYLE VARIABLES: HOW OFTEN DO YOU HAVE A DRINK CONTAINING ALCOHOL: NEVER

## 2024-03-11 ASSESSMENT — PATIENT HEALTH QUESTIONNAIRE - PHQ9
10. IF YOU CHECKED OFF ANY PROBLEMS, HOW DIFFICULT HAVE THESE PROBLEMS MADE IT FOR YOU TO DO YOUR WORK, TAKE CARE OF THINGS AT HOME, OR GET ALONG WITH OTHER PEOPLE: 0
4. FEELING TIRED OR HAVING LITTLE ENERGY: 0
SUM OF ALL RESPONSES TO PHQ QUESTIONS 1-9: 0
9. THOUGHTS THAT YOU WOULD BE BETTER OFF DEAD, OR OF HURTING YOURSELF: 0
2. FEELING DOWN, DEPRESSED OR HOPELESS: 0
6. FEELING BAD ABOUT YOURSELF - OR THAT YOU ARE A FAILURE OR HAVE LET YOURSELF OR YOUR FAMILY DOWN: 0
1. LITTLE INTEREST OR PLEASURE IN DOING THINGS: 0
3. TROUBLE FALLING OR STAYING ASLEEP: 0
5. POOR APPETITE OR OVEREATING: 0
SUM OF ALL RESPONSES TO PHQ QUESTIONS 1-9: 0
SUM OF ALL RESPONSES TO PHQ9 QUESTIONS 1 & 2: 0
8. MOVING OR SPEAKING SO SLOWLY THAT OTHER PEOPLE COULD HAVE NOTICED. OR THE OPPOSITE, BEING SO FIGETY OR RESTLESS THAT YOU HAVE BEEN MOVING AROUND A LOT MORE THAN USUAL: 0
7. TROUBLE CONCENTRATING ON THINGS, SUCH AS READING THE NEWSPAPER OR WATCHING TELEVISION: 0
SUM OF ALL RESPONSES TO PHQ QUESTIONS 1-9: 0
SUM OF ALL RESPONSES TO PHQ QUESTIONS 1-9: 0

## 2024-03-11 ASSESSMENT — ENCOUNTER SYMPTOMS
COUGH: 0
SHORTNESS OF BREATH: 0
BLOOD IN STOOL: 0
ABDOMINAL PAIN: 0
DIARRHEA: 0
CHEST TIGHTNESS: 0
CONSTIPATION: 0

## 2024-03-11 NOTE — PROGRESS NOTES
SUBJECTIVE:  Rina Mora   1945   female   No Known Allergies    Chief Complaint   Patient presents with    3 Month Follow-Up    Diabetes        Patient Active Problem List    Diagnosis Date Noted    Major depressive disorder, recurrent, mild 11/11/2022     Priority: Medium    Major depressive disorder, recurrent, moderate 11/11/2022     Priority: Medium    Major depressive disorder, recurrent, unspecified 11/11/2022     Priority: Medium    CKD (chronic kidney disease), stage III ([facility]) 02/04/2020    Gout 02/04/2020     Formatting of this note might be different from the original.  off allopurinol      Vitamin D deficiency 02/04/2020    HTN (hypertension) 02/04/2020    DM2 (diabetes mellitus, type 2) ([facility]) 02/04/2020     Formatting of this note might be different from the original.  controlled her HgA1C was 6.      10/21/19 RIGHT TKA 10/21/2019    Encounter for postoperative care 10/21/2019    Prerenal azotemia 10/21/2019    Primary osteoarthritis of left knee 01/02/2019    Idiopathic chronic gout of left foot without tophus 10/31/2017    Type 2 diabetes mellitus with complication, without long-term current use of insulin ([facility]) 08/07/2016    Depression 03/04/2015    Ventral hernia 06/20/2014    Morbid obesity ([facility]) 06/20/2014    Macular degeneration 06/20/2014    Hypertension     Osteopenia     Hyperlipidemia     Squamous cell carcinoma of skin 11/16/2011       HPI   Patient is here today for follow-up on diabetes, hyperlipidemia, depression, CRD, hypertension and macular degeneration.  Patient reports overall she has been feeling well and has no specific concerns or questions today.  Patient's not currently on any treatment for diabetes and last hemoglobin A1c is good at 4.9.  No episodes of hypoglycemia.  Patient continues to do well on current treatment for hypertension and hyperlipidemia.  Denies chest pain, shortness of breath or myalgias.  No headache change in vision or neurologic symptoms.  No GI

## 2024-03-11 NOTE — PROGRESS NOTES
Medicare Annual Wellness Visit    Rina Mora is here for Medicare AW    Assessment & Plan   Recommendations for Preventive Services Due: see orders and patient instructions/AVS.  Recommended screening schedule for the next 5-10 years is provided to the patient in written form: see Patient Instructions/AVS.     No follow-ups on file.     Subjective   Patient's complete Health Risk Assessment and screening values have been reviewed and are found in Flowsheets. The following problems were reviewed today and where indicated follow up appointments were made and/or referrals ordered.    Positive Risk Factor Screenings with Interventions:    Fall Risk:  Do you feel unsteady or are you worried about falling? : (!) yes  2 or more falls in past year?: no  Fall with injury in past year?: no       Interventions:    Reviewed medications, home hazards, visual acuity, and co-morbidities that can increase risk for falls  Patient declines any further evaluation or treatment             Activity, Diet, and Weight:  On average, how many days per week do you engage in moderate to strenuous exercise (like a brisk walk)?: 0 days  On average, how many minutes do you engage in exercise at this level?: 0 min    Do you eat balanced/healthy meals regularly?: Yes    There is no height or weight on file to calculate BMI.        Inactivity Interventions:  Patient declined any further interventions or treatment          Vision Screen:  Do you have difficulty driving, watching TV, or doing any of your daily activities because of your eyesight?: (!) Yes  Have you had an eye exam within the past year?: Yes  No results found.    Interventions:   Patient declines any further evaluation or treatment     ADL's:   Patient reports needing help with:  Select all that apply: (!) Banking/Finances, Transportation    Interventions:  Patient declined any further interventions or treatment           Objective   There were no vitals filed for this visit.

## 2024-03-11 NOTE — PATIENT INSTRUCTIONS
benefits. Some of the tests and screenings are paid in full while other may be subject to a deductible, co-insurance, and/or copay.    Some of these benefits include a comprehensive review of your medical history including lifestyle, illnesses that may run in your family, and various assessments and screenings as appropriate.    After reviewing your medical record and screening and assessments performed today your provider may have ordered immunizations, labs, imaging, and/or referrals for you.  A list of these orders (if applicable) as well as your Preventive Care list are included within your After Visit Summary for your review.    Other Preventive Recommendations:    A preventive eye exam performed by an eye specialist is recommended every 1-2 years to screen for glaucoma; cataracts, macular degeneration, and other eye disorders.  A preventive dental visit is recommended every 6 months.  Try to get at least 150 minutes of exercise per week or 10,000 steps per day on a pedometer .  Order or download the FREE \"Exercise & Physical Activity: Your Everyday Guide\" from [employer]. Call 1-895.446.6767 or search [employer] online.  You need 6340-1231 mg of calcium and 8856-7020 IU of vitamin D per day. It is possible to meet your calcium requirement with diet alone, but a vitamin D supplement is usually necessary to meet this goal.  When exposed to the sun, use a sunscreen that protects against both UVA and UVB radiation with an SPF of 30 or greater. Reapply every 2 to 3 hours or after sweating, drying off with a towel, or swimming.  Always wear a seat belt when traveling in a car. Always wear a helmet when riding a bicycle or motorcycle.

## 2024-06-03 SDOH — HEALTH STABILITY: PHYSICAL HEALTH: ON AVERAGE, HOW MANY DAYS PER WEEK DO YOU ENGAGE IN MODERATE TO STRENUOUS EXERCISE (LIKE A BRISK WALK)?: 0 DAYS

## 2024-06-03 SDOH — HEALTH STABILITY: PHYSICAL HEALTH: ON AVERAGE, HOW MANY MINUTES DO YOU ENGAGE IN EXERCISE AT THIS LEVEL?: 0 MIN

## 2024-06-04 ENCOUNTER — OFFICE VISIT (OUTPATIENT)
Dept: FAMILY MEDICINE CLINIC | Age: 79
End: 2024-06-04
Payer: MEDICARE

## 2024-06-04 VITALS
HEIGHT: 64 IN | WEIGHT: 163.6 LBS | SYSTOLIC BLOOD PRESSURE: 118 MMHG | DIASTOLIC BLOOD PRESSURE: 64 MMHG | OXYGEN SATURATION: 92 % | BODY MASS INDEX: 27.93 KG/M2

## 2024-06-04 DIAGNOSIS — F33.0 MAJOR DEPRESSIVE DISORDER, RECURRENT, MILD (HCC): ICD-10-CM

## 2024-06-04 DIAGNOSIS — I10 PRIMARY HYPERTENSION: Primary | ICD-10-CM

## 2024-06-04 DIAGNOSIS — E78.00 PURE HYPERCHOLESTEROLEMIA: ICD-10-CM

## 2024-06-04 PROCEDURE — 1123F ACP DISCUSS/DSCN MKR DOCD: CPT | Performed by: STUDENT IN AN ORGANIZED HEALTH CARE EDUCATION/TRAINING PROGRAM

## 2024-06-04 PROCEDURE — 3074F SYST BP LT 130 MM HG: CPT | Performed by: STUDENT IN AN ORGANIZED HEALTH CARE EDUCATION/TRAINING PROGRAM

## 2024-06-04 PROCEDURE — 99214 OFFICE O/P EST MOD 30 MIN: CPT | Performed by: STUDENT IN AN ORGANIZED HEALTH CARE EDUCATION/TRAINING PROGRAM

## 2024-06-04 PROCEDURE — 3078F DIAST BP <80 MM HG: CPT | Performed by: STUDENT IN AN ORGANIZED HEALTH CARE EDUCATION/TRAINING PROGRAM

## 2024-06-04 RX ORDER — LISINOPRIL 10 MG/1
TABLET ORAL
Qty: 90 TABLET | Refills: 3 | Status: SHIPPED | OUTPATIENT
Start: 2024-06-04

## 2024-06-04 RX ORDER — BISOPROLOL FUMARATE AND HYDROCHLOROTHIAZIDE 10; 6.25 MG/1; MG/1
TABLET ORAL
Qty: 90 TABLET | Refills: 3 | Status: SHIPPED | OUTPATIENT
Start: 2024-06-04

## 2024-06-04 RX ORDER — SERTRALINE HYDROCHLORIDE 100 MG/1
TABLET, FILM COATED ORAL
Qty: 135 TABLET | Refills: 3 | Status: SHIPPED | OUTPATIENT
Start: 2024-06-04

## 2024-06-04 RX ORDER — PRAVASTATIN SODIUM 40 MG
TABLET ORAL
Qty: 90 TABLET | Refills: 3 | Status: SHIPPED | OUTPATIENT
Start: 2024-06-04

## 2024-06-04 NOTE — PROGRESS NOTES
body mass index is 28.08 kg/m² as calculated from the following:    Height as of this encounter: 1.626 m (5' 4\").    Weight as of this encounter: 74.2 kg (163 lb 9.6 oz).    Physical Exam  Vitals reviewed.   Constitutional:       General: She is not in acute distress.     Appearance: Normal appearance. She is not ill-appearing, toxic-appearing or diaphoretic.   HENT:      Mouth/Throat:      Mouth: Mucous membranes are moist.   Eyes:      General: No scleral icterus.     Conjunctiva/sclera: Conjunctivae normal.   Cardiovascular:      Rate and Rhythm: Normal rate.   Pulmonary:      Effort: Pulmonary effort is normal.   Abdominal:      General: There is no distension.   Skin:     General: Skin is dry.      Coloration: Skin is not jaundiced or pale.   Neurological:      Mental Status: She is alert. Mental status is at baseline.      Gait: Gait normal.   Psychiatric:         Behavior: Behavior normal.         Thought Content: Thought content normal.         Immunization History   Administered Date(s) Administered    COVID-19, PFIZER Bivalent, DO NOT Dilute, (age 12y+), IM, 30 mcg/0.3 mL 11/14/2022    COVID-19, PFIZER GRAY top, DO NOT Dilute, (age 12 y+), IM, 30 mcg/0.3 mL 05/16/2022    COVID-19, PFIZER PURPLE top, DILUTE for use, (age 12 y+), 30mcg/0.3mL 01/23/2021, 02/10/2021, 09/25/2021    Influenza Vaccine, unspecified formulation 09/17/2016    Influenza Virus Vaccine 11/03/2006, 10/24/2011, 11/25/2013, 09/17/2016    Influenza, FLUAD, (age 65 y+), Adjuvanted, 0.5mL 10/06/2020, 10/20/2021, 11/11/2022, 12/11/2023    Influenza, High Dose (Fluzone 65 yrs and older) 11/03/2015, 10/31/2017, 10/17/2018, 09/04/2019    Pneumococcal, PCV-13, PREVNAR 13, (age 6w+), IM, 0.5mL 11/03/2015    Pneumococcal, PCV20, PREVNAR 20, (age 6w+), IM, 0.5mL 04/30/2023    Pneumococcal, PPSV23, PNEUMOVAX 23, (age 2y+), SC/IM, 0.5mL 11/08/2005, 10/31/2017    TDaP, ADACEL (age 10y-64y), BOOSTRIX (age 10y+), IM, 0.5mL 02/15/2016    Td, unspecified

## 2024-06-07 DIAGNOSIS — E78.00 PURE HYPERCHOLESTEROLEMIA: ICD-10-CM

## 2024-06-07 DIAGNOSIS — I10 PRIMARY HYPERTENSION: ICD-10-CM

## 2024-06-07 DIAGNOSIS — F33.0 MAJOR DEPRESSIVE DISORDER, RECURRENT, MILD (HCC): ICD-10-CM

## 2024-06-10 RX ORDER — PRAVASTATIN SODIUM 40 MG
TABLET ORAL
Qty: 90 TABLET | Refills: 3 | OUTPATIENT
Start: 2024-06-10

## 2024-06-10 RX ORDER — LISINOPRIL 10 MG/1
TABLET ORAL
Qty: 90 TABLET | Refills: 3 | OUTPATIENT
Start: 2024-06-10

## 2024-06-10 RX ORDER — SERTRALINE HYDROCHLORIDE 100 MG/1
TABLET, FILM COATED ORAL
Qty: 135 TABLET | Refills: 3 | OUTPATIENT
Start: 2024-06-10

## 2024-09-15 ENCOUNTER — PATIENT MESSAGE (OUTPATIENT)
Dept: FAMILY MEDICINE CLINIC | Age: 79
End: 2024-09-15

## 2024-09-16 ENCOUNTER — E-VISIT (OUTPATIENT)
Dept: PRIMARY CARE CLINIC | Age: 79
End: 2024-09-16
Payer: MEDICARE

## 2024-09-16 DIAGNOSIS — H00.016 HORDEOLUM EXTERNUM OF LEFT EYE, UNSPECIFIED EYELID: Primary | ICD-10-CM

## 2024-09-16 PROCEDURE — 99422 OL DIG E/M SVC 11-20 MIN: CPT | Performed by: NURSE PRACTITIONER

## 2024-10-29 ENCOUNTER — TELEPHONE (OUTPATIENT)
Dept: FAMILY MEDICINE CLINIC | Age: 79
End: 2024-10-29

## 2024-10-29 NOTE — TELEPHONE ENCOUNTER
Pt's daughter, Kassandra called to say that her mom has been having weight loss and diarrhea multiple times a day. There is no fever, vomiting or pain. Appetite is good. Kassandra stated that everything pt \"eats comes out pretty quickly from the other end.\" Pt really likes Dr. Odom and doesn't want to see another provider. Kassandra tried to submit an e-visit, but she said the [employer] wouldn't submit the visit. Kassandra is asking if Dr. Odom can squeeze her mom in for an appt?

## 2024-10-30 ENCOUNTER — OFFICE VISIT (OUTPATIENT)
Dept: FAMILY MEDICINE CLINIC | Age: 79
End: 2024-10-30

## 2024-10-30 VITALS
HEART RATE: 47 BPM | SYSTOLIC BLOOD PRESSURE: 126 MMHG | OXYGEN SATURATION: 98 % | BODY MASS INDEX: 27.9 KG/M2 | HEIGHT: 64 IN | WEIGHT: 163.4 LBS | DIASTOLIC BLOOD PRESSURE: 68 MMHG

## 2024-10-30 DIAGNOSIS — M25.511 CHRONIC RIGHT SHOULDER PAIN: ICD-10-CM

## 2024-10-30 DIAGNOSIS — R15.9 INCONTINENCE OF FECES, UNSPECIFIED FECAL INCONTINENCE TYPE: ICD-10-CM

## 2024-10-30 DIAGNOSIS — R15.9 INCONTINENCE OF FECES, UNSPECIFIED FECAL INCONTINENCE TYPE: Primary | ICD-10-CM

## 2024-10-30 DIAGNOSIS — R19.7 DIARRHEA, UNSPECIFIED TYPE: ICD-10-CM

## 2024-10-30 DIAGNOSIS — G89.29 CHRONIC RIGHT SHOULDER PAIN: ICD-10-CM

## 2024-10-30 LAB
ALBUMIN SERPL-MCNC: 4.5 G/DL (ref 3.4–5)
ALBUMIN/GLOB SERPL: 1.9 {RATIO} (ref 1.1–2.2)
ALP SERPL-CCNC: 118 U/L (ref 40–129)
ALT SERPL-CCNC: 9 U/L (ref 10–40)
ANION GAP SERPL CALCULATED.3IONS-SCNC: 9 MMOL/L (ref 3–16)
AST SERPL-CCNC: 17 U/L (ref 15–37)
BILIRUB SERPL-MCNC: 0.6 MG/DL (ref 0–1)
BUN SERPL-MCNC: 25 MG/DL (ref 7–20)
CALCIUM SERPL-MCNC: 9.3 MG/DL (ref 8.3–10.6)
CHLORIDE SERPL-SCNC: 100 MMOL/L (ref 99–110)
CO2 SERPL-SCNC: 29 MMOL/L (ref 21–32)
CREAT SERPL-MCNC: 1.2 MG/DL (ref 0.6–1.2)
GFR SERPLBLD CREATININE-BSD FMLA CKD-EPI: 46 ML/MIN/{1.73_M2}
GLUCOSE SERPL-MCNC: 75 MG/DL (ref 70–99)
POTASSIUM SERPL-SCNC: 4.6 MMOL/L (ref 3.5–5.1)
PROT SERPL-MCNC: 6.9 G/DL (ref 6.4–8.2)
SODIUM SERPL-SCNC: 138 MMOL/L (ref 136–145)

## 2024-10-30 ASSESSMENT — ENCOUNTER SYMPTOMS
DIARRHEA: 1
ABDOMINAL DISTENTION: 0
VOMITING: 0
ABDOMINAL PAIN: 0
NAUSEA: 0
RESPIRATORY NEGATIVE: 1

## 2024-10-30 NOTE — PROGRESS NOTES
Patient: Rina Mora is a 79 y.o. female who presents today with the following Chief Complaint(s):  Chief Complaint   Patient presents with    Diarrhea     Multiple times a day       Assessment:  Encounter Diagnoses   Name Primary?    Incontinence of feces, unspecified fecal incontinence type Yes    Diarrhea, unspecified type     Chronic right shoulder pain        Plan:  1. Incontinence of feces, unspecified fecal incontinence type  Will refer to GI for further evaluation.  - Geovanna Kenny MD, Gastroenterology, [facility]  - Comprehensive Metabolic Panel; Future    2. Diarrhea, unspecified type  Will refer to GI for further evaluation.  Check CMP to make sure electrolytes and kidney function are stable.  - Geovanna Kenny MD, Gastroenterology, [facility]  - Comprehensive Metabolic Panel; Future    3. Chronic right shoulder pain  Will trial physical therapy and follow-up if no improvement.  - [facility] Physical [facility]      HPI  Patient presents today with her daughter.  She has concerns of diarrhea, incontinence of stool and right shoulder pain.  Regarding the diarrhea patient states that stools are usually loose but she does sometimes have solid stools.  Most the time she is not getting any warning and she is having incontinence.  She denies any abdominal pain, nausea or vomiting.  She states her appetite is normal.  There was some reported weight loss but according to our scale the patient's weight has remained the same since June of this year.  She does have times where she does feel like she gets an urge to go and can have a bowel movement and get to the bathroom on time but she states majority of the times especially in the past month this has been worsening.  She states it started about 6 months ago.  She reports this also occurs at night and can wake her up out of sleep.  She has not seen a GI doctor in several years.  She reports her last colonoscopy was

## 2024-10-31 ENCOUNTER — PATIENT MESSAGE (OUTPATIENT)
Dept: FAMILY MEDICINE CLINIC | Age: 79
End: 2024-10-31

## 2024-11-01 NOTE — TELEPHONE ENCOUNTER
Metamucil can actually make diarrhea worse.  Fiber typically helps liquid remain in the stool, so it is beneficial for people to have constipation to remain more regular.  If people are already having diarrhea, fiber intake can make it worse by overstimulating the intestines.

## 2024-11-12 DIAGNOSIS — R15.9 FULL INCONTINENCE OF FECES: Primary | ICD-10-CM

## 2024-11-12 DIAGNOSIS — R19.7 DIARRHEA, UNSPECIFIED TYPE: Primary | ICD-10-CM

## 2024-11-15 DIAGNOSIS — R19.7 DIARRHEA, UNSPECIFIED TYPE: ICD-10-CM

## 2024-11-15 LAB — C DIFF TOX A+B STL QL IA: NORMAL

## 2024-11-16 LAB — CRYPTOSP AG STL QL IA: NORMAL

## 2024-11-18 LAB
FAT STL QL: NORMAL
NEUTRAL FAT STL QL: NORMAL

## 2024-11-19 LAB — CALPROTECTIN STL-MCNT: 22 UG/G

## 2024-11-20 LAB — ELASTASE PANC STL-MCNT: 551 UG/G

## 2024-11-24 LAB — INTERPRETATION: NEGATIVE

## 2024-11-26 ENCOUNTER — OFFICE VISIT (OUTPATIENT)
Dept: SURGERY | Age: 79
End: 2024-11-26
Payer: MEDICARE

## 2024-11-26 ENCOUNTER — PREP FOR PROCEDURE (OUTPATIENT)
Dept: SURGERY | Age: 79
End: 2024-11-26

## 2024-11-26 VITALS
DIASTOLIC BLOOD PRESSURE: 55 MMHG | BODY MASS INDEX: 24.82 KG/M2 | HEART RATE: 69 BPM | SYSTOLIC BLOOD PRESSURE: 142 MMHG | TEMPERATURE: 97.9 F | RESPIRATION RATE: 16 BRPM | WEIGHT: 144.6 LBS | OXYGEN SATURATION: 100 %

## 2024-11-26 DIAGNOSIS — K52.9 CHRONIC DIARRHEA: ICD-10-CM

## 2024-11-26 DIAGNOSIS — R15.9 INCONTINENCE OF FECES WITH FECAL URGENCY: Primary | ICD-10-CM

## 2024-11-26 DIAGNOSIS — R15.2 INCONTINENCE OF FECES WITH FECAL URGENCY: Primary | ICD-10-CM

## 2024-11-26 DIAGNOSIS — R15.2 FECAL URGENCY: ICD-10-CM

## 2024-11-26 DIAGNOSIS — R15.2 INCONTINENCE OF FECES WITH FECAL URGENCY: ICD-10-CM

## 2024-11-26 DIAGNOSIS — R15.9 INCONTINENCE OF FECES WITH FECAL URGENCY: ICD-10-CM

## 2024-11-26 PROCEDURE — 1123F ACP DISCUSS/DSCN MKR DOCD: CPT | Performed by: SURGERY

## 2024-11-26 PROCEDURE — 3078F DIAST BP <80 MM HG: CPT | Performed by: SURGERY

## 2024-11-26 PROCEDURE — 3077F SYST BP >= 140 MM HG: CPT | Performed by: SURGERY

## 2024-11-26 PROCEDURE — 99204 OFFICE O/P NEW MOD 45 MIN: CPT | Performed by: SURGERY

## 2024-11-26 RX ORDER — SODIUM CHLORIDE 0.9 % (FLUSH) 0.9 %
5-40 SYRINGE (ML) INJECTION PRN
Status: CANCELLED | OUTPATIENT
Start: 2024-12-30

## 2024-11-26 RX ORDER — SODIUM CHLORIDE 0.9 % (FLUSH) 0.9 %
5-40 SYRINGE (ML) INJECTION EVERY 12 HOURS SCHEDULED
Status: CANCELLED | OUTPATIENT
Start: 2024-12-30

## 2024-11-26 RX ORDER — SODIUM CHLORIDE 9 MG/ML
INJECTION, SOLUTION INTRAVENOUS PRN
Status: CANCELLED | OUTPATIENT
Start: 2024-12-30

## 2024-11-26 NOTE — PROGRESS NOTES
replacement, worsening function over time, potential need for removal.     We will touch base with the [employer] device rep, who will reach out to answer questions about the device itself and to start looking at the schedule for surgery in the coming weeks.    Continue with current medications    I provided written information in the After Visit Summary AVS Regarding: Fecal incontinence    DISPOSITION: Plan sacral nerve stimulator surgery    My findings will be relayed to consulting practitioner or PCP via Epic    Note completed using dictation software, please excuse any errors.    Electronically signed by Nicholas Lozano MD on 11/26/2024 at 9:34 AM

## 2024-11-26 NOTE — PATIENT INSTRUCTIONS
exercises?    Most patients will be referred for pelvic floor physical therapy. This is offered at [facility]. There are specialized physical therapists that will help you regain strength in your pelvic muscles and retrain you to sense your bowel function. This takes place over the course of about 3 months.    Some patients will be referred to a Urogynecologist if there are concomitant urinary or gynecologic issues that may be contributing to fecal incontinence    Depending on improvement from physical therapy and dietary changes, some patients may be sent for additional testing, such as ultrasound, barium enemas, colonoscopy, or MRI.    Some patients find much improvement in their quality of life with a sacral nerve stimulator. This procedure is performed in two parts. First, temporary wires (\"leads\") are placed, and your response is measured over the course of one week. If you have at least a 50% improvement in symptoms, a second procedure is performed in which the permament stimulator is implanted. Both of these procedures are performed in the operating room with sedation, and are both same-day surgeries. Long term success rates are generally very good.    Rarely patients may be candidates for surgical sphincter reconstruction, though long term success rates are a bit discouraging.    Occasionally, there are other reasons for incontinence. In these specific scenarios, the primary problem should be addressed: rectal prolapse, rectal cancer or polyps, severe hemorrhoids, rectocele, Crohn's disease, ulcerative colitis, certain infections of the colon or rectum. Most of these can be assessed for by office exam and colonoscopy.    Please remember, fecal incontinence can be complex, and there may be multiple factors involved in addressing and treating this disorder. Be patient and diligent.    You will see the surgeon every 2-3 months depending on your progress and improvement with various

## 2024-12-18 ENCOUNTER — TELEPHONE (OUTPATIENT)
Dept: SURGERY | Age: 79
End: 2024-12-18

## 2024-12-18 PROBLEM — R15.2 FECAL URGENCY: Status: ACTIVE | Noted: 2024-11-26

## 2024-12-18 PROBLEM — R15.9 INCONTINENCE OF FECES WITH FECAL URGENCY: Status: ACTIVE | Noted: 2024-11-26

## 2024-12-18 PROBLEM — R15.2 INCONTINENCE OF FECES WITH FECAL URGENCY: Status: ACTIVE | Noted: 2024-11-26

## 2024-12-18 PROBLEM — K52.9 CHRONIC DIARRHEA: Status: ACTIVE | Noted: 2024-11-26

## 2024-12-18 NOTE — TELEPHONE ENCOUNTER
Patient has been scheduled for:    Procedure:  [employer] sacral nerve stimulator, stage I with fluoroscopy   Date:  12/30  Time: 10:00  Arrival: 8:00  Hospital: [facility]    ASA?:  Prep? Fleet Enema    Pre-op?    Post-op Appt?     Patient advised they will need a .    Case request sent and prep for proc orders done     Medication sent to Pharmacy:     Stents or ostomy marking?    Instructions have been mailed/emailed to: My Chart     Added to outlook calendar

## 2024-12-18 NOTE — TELEPHONE ENCOUNTER
Patients daughter Kassandra called wanting to discuss scheduling surgery with Dr. Lozano     Please call her back [phone]

## 2024-12-20 RX ORDER — LOPERAMIDE HYDROCHLORIDE 2 MG/1
2 CAPSULE ORAL 4 TIMES DAILY PRN
COMMUNITY

## 2024-12-20 RX ORDER — DICYCLOMINE HCL 20 MG
20 TABLET ORAL 3 TIMES DAILY
COMMUNITY

## 2024-12-20 NOTE — PROGRESS NOTES
[facility] PRE-SURGICAL TESTING INSTRUCTIONS                      PRIOR TO PROCEDURE DATE:    1. PLEASE FOLLOW ANY INSTRUCTIONS GIVEN TO YOU PER YOUR SURGEON.      2. Arrange for someone to drive you home and be with you for the first 24 hours after discharge for your safety after your procedure for which you received sedation. Ensure it is someone we can share information with regarding your discharge.     NOTE: At this time ONLY 2 ADULTS may accompany you   One person ENCOURAGED to stay at hospital entire time if outpatient surgery      3. You must contact your surgeon for instructions IF:  You are taking any blood thinners, aspirin, anti-inflammatory or vitamins.  There is a change in your physical condition such as a cold, fever, rash, cuts, sores, or any other infection, especially near your surgical site.    4. Do not drink alcohol the day before or day of your procedure.  Do not use any recreational marijuana at least 24 hours or street drugs (heroin, cocaine) at minimum 5 days prior to your procedure.     5. A Pre-Surgical History and Physical MUST be completed WITHIN 30 DAYS OR LESS prior to your procedure.by your Physician or an Urgent Care        THE DAY OF YOUR PROCEDURE:  1.  Follow instructions for ARRIVAL TIME as DIRECTED BY YOUR SURGEON.     2. Enter the MAIN entrance from [facility] and follow the signs to the free Parking Garage or  Parking (offered free of charge 7 am-5pm).      3. Enter the Main Entrance of the hospital (do not enter from the lower level of the parking garage). Upon entrance, check in with the  at the surgical information desk on your LEFT.   Bring your insurance card and photo ID to register      4. DO NOT EAT ANYTHING 8 hours prior to arrival for surgery.  You may have up to 8 ounces of water 4 hours prior to your arrival for surgery.   NOTE: ALL Gastric, Bariatric & Bowel surgery patients - you MUST follow your surgeon's instructions regarding

## 2024-12-23 ENCOUNTER — TELEPHONE (OUTPATIENT)
Dept: SURGERY | Age: 79
End: 2024-12-23

## 2024-12-23 ENCOUNTER — PATIENT MESSAGE (OUTPATIENT)
Dept: SURGERY | Age: 79
End: 2024-12-23

## 2024-12-23 PROBLEM — R15.9 FECAL INCONTINENCE: Status: ACTIVE | Noted: 2024-11-26

## 2024-12-23 NOTE — TELEPHONE ENCOUNTER
Spoke with patient to confirm 8:30 sx time, 6:30 arr time, to do Fleets Enema prior to bedtime the night before,  has to be 18 or over to transport home. Explained to pt to have daughter-Kassandra read the My Chart message that was sent regarding her surgery.

## 2024-12-27 ENCOUNTER — TELEPHONE (OUTPATIENT)
Dept: SURGERY | Age: 79
End: 2024-12-27

## 2024-12-27 NOTE — TELEPHONE ENCOUNTER
I have placed a reminder call to patient for upcoming procedure.    Did you speak directly to patient or leave a voicemail? Spoke to patient    Prep?     NPO, Enema     Must have a  that is over the age of 18.   Must be a friend or family member that can be responsible for signing them out after surgery.    Arrive at the main entrance [facility] at 5:30am

## 2024-12-30 ENCOUNTER — HOSPITAL ENCOUNTER (OUTPATIENT)
Age: 79
Setting detail: OUTPATIENT SURGERY
Discharge: HOME | End: 2024-12-30
Attending: SURGERY | Admitting: SURGERY
Payer: MEDICARE

## 2024-12-30 ENCOUNTER — APPOINTMENT (OUTPATIENT)
Dept: GENERAL RADIOLOGY | Age: 79
End: 2024-12-30
Attending: SURGERY
Payer: MEDICARE

## 2024-12-30 ENCOUNTER — ANESTHESIA (OUTPATIENT)
Dept: OPERATING ROOM | Age: 79
End: 2024-12-30
Payer: MEDICARE

## 2024-12-30 ENCOUNTER — ANESTHESIA EVENT (OUTPATIENT)
Dept: OPERATING ROOM | Age: 79
End: 2024-12-30
Payer: MEDICARE

## 2024-12-30 VITALS
OXYGEN SATURATION: 93 % | WEIGHT: 154 LBS | DIASTOLIC BLOOD PRESSURE: 59 MMHG | SYSTOLIC BLOOD PRESSURE: 162 MMHG | HEIGHT: 64 IN | BODY MASS INDEX: 26.29 KG/M2 | TEMPERATURE: 96.9 F | HEART RATE: 45 BPM | RESPIRATION RATE: 14 BRPM

## 2024-12-30 LAB
GLUCOSE BLD-MCNC: 86 MG/DL (ref 70–99)
PERFORMED ON: NORMAL

## 2024-12-30 PROCEDURE — 7100000001 HC PACU RECOVERY - ADDTL 15 MIN: Performed by: SURGERY

## 2024-12-30 PROCEDURE — 7100000010 HC PHASE II RECOVERY - FIRST 15 MIN: Performed by: SURGERY

## 2024-12-30 PROCEDURE — 2500000003 HC RX 250 WO HCPCS: Performed by: SURGERY

## 2024-12-30 PROCEDURE — 3700000000 HC ANESTHESIA ATTENDED CARE: Performed by: SURGERY

## 2024-12-30 PROCEDURE — 2709999900 HC NON-CHARGEABLE SUPPLY: Performed by: SURGERY

## 2024-12-30 PROCEDURE — 95972 ALYS CPLX SP/PN NPGT W/PRGRM: CPT | Performed by: SURGERY

## 2024-12-30 PROCEDURE — C1778 LEAD, NEUROSTIMULATOR: HCPCS | Performed by: SURGERY

## 2024-12-30 PROCEDURE — 6360000002 HC RX W HCPCS: Performed by: SURGERY

## 2024-12-30 PROCEDURE — 2580000003 HC RX 258: Performed by: ANESTHESIOLOGY

## 2024-12-30 PROCEDURE — 72220 X-RAY EXAM SACRUM TAILBONE: CPT

## 2024-12-30 PROCEDURE — 3600000012 HC SURGERY LEVEL 2 ADDTL 15MIN: Performed by: SURGERY

## 2024-12-30 PROCEDURE — 7100000011 HC PHASE II RECOVERY - ADDTL 15 MIN: Performed by: SURGERY

## 2024-12-30 PROCEDURE — 3600000002 HC SURGERY LEVEL 2 BASE: Performed by: SURGERY

## 2024-12-30 PROCEDURE — 64581 OPN IMPLTJ NEA SACRAL NERVE: CPT | Performed by: SURGERY

## 2024-12-30 PROCEDURE — 2500000003 HC RX 250 WO HCPCS

## 2024-12-30 PROCEDURE — C1897 LEAD, NEUROSTIM TEST KIT: HCPCS | Performed by: SURGERY

## 2024-12-30 PROCEDURE — 76000 FLUOROSCOPY <1 HR PHYS/QHP: CPT | Performed by: SURGERY

## 2024-12-30 PROCEDURE — 6360000002 HC RX W HCPCS

## 2024-12-30 PROCEDURE — 7100000000 HC PACU RECOVERY - FIRST 15 MIN: Performed by: SURGERY

## 2024-12-30 PROCEDURE — 6360000002 HC RX W HCPCS: Performed by: ANESTHESIOLOGY

## 2024-12-30 PROCEDURE — 3700000001 HC ADD 15 MINUTES (ANESTHESIA): Performed by: SURGERY

## 2024-12-30 DEVICE — TRIAL STIMULATOR
Type: IMPLANTABLE DEVICE | Status: FUNCTIONAL
Brand: AXONICS

## 2024-12-30 DEVICE — TINED LEAD KIT
Type: IMPLANTABLE DEVICE | Status: FUNCTIONAL
Brand: AXONICS

## 2024-12-30 DEVICE — LEAD IMPLANT KIT
Type: IMPLANTABLE DEVICE | Status: FUNCTIONAL
Brand: AXONICS

## 2024-12-30 RX ORDER — GLYCOPYRROLATE 0.2 MG/ML
INJECTION INTRAMUSCULAR; INTRAVENOUS
Status: COMPLETED
Start: 2024-12-30 | End: 2024-12-30

## 2024-12-30 RX ORDER — SODIUM CHLORIDE, SODIUM LACTATE, POTASSIUM CHLORIDE, CALCIUM CHLORIDE 600; 310; 30; 20 MG/100ML; MG/100ML; MG/100ML; MG/100ML
INJECTION, SOLUTION INTRAVENOUS CONTINUOUS
Status: DISCONTINUED | OUTPATIENT
Start: 2024-12-30 | End: 2024-12-30 | Stop reason: HOSPADM

## 2024-12-30 RX ORDER — DEXAMETHASONE SODIUM PHOSPHATE 4 MG/ML
INJECTION, SOLUTION INTRA-ARTICULAR; INTRALESIONAL; INTRAMUSCULAR; INTRAVENOUS; SOFT TISSUE
Status: DISCONTINUED | OUTPATIENT
Start: 2024-12-30 | End: 2024-12-30 | Stop reason: SDUPTHER

## 2024-12-30 RX ORDER — ONDANSETRON 2 MG/ML
INJECTION INTRAMUSCULAR; INTRAVENOUS
Status: DISCONTINUED | OUTPATIENT
Start: 2024-12-30 | End: 2024-12-30 | Stop reason: SDUPTHER

## 2024-12-30 RX ORDER — LABETALOL HYDROCHLORIDE 5 MG/ML
10 INJECTION, SOLUTION INTRAVENOUS
Status: DISCONTINUED | OUTPATIENT
Start: 2024-12-30 | End: 2024-12-30 | Stop reason: HOSPADM

## 2024-12-30 RX ORDER — SODIUM CHLORIDE 0.9 % (FLUSH) 0.9 %
5-40 SYRINGE (ML) INJECTION EVERY 12 HOURS SCHEDULED
Status: DISCONTINUED | OUTPATIENT
Start: 2024-12-30 | End: 2024-12-30 | Stop reason: HOSPADM

## 2024-12-30 RX ORDER — OXYCODONE HYDROCHLORIDE 5 MG/1
5 TABLET ORAL PRN
Status: DISCONTINUED | OUTPATIENT
Start: 2024-12-30 | End: 2024-12-30 | Stop reason: HOSPADM

## 2024-12-30 RX ORDER — NALOXONE HYDROCHLORIDE 0.4 MG/ML
INJECTION, SOLUTION INTRAMUSCULAR; INTRAVENOUS; SUBCUTANEOUS PRN
Status: DISCONTINUED | OUTPATIENT
Start: 2024-12-30 | End: 2024-12-30 | Stop reason: HOSPADM

## 2024-12-30 RX ORDER — ONDANSETRON 2 MG/ML
4 INJECTION INTRAMUSCULAR; INTRAVENOUS
Status: DISCONTINUED | OUTPATIENT
Start: 2024-12-30 | End: 2024-12-30 | Stop reason: HOSPADM

## 2024-12-30 RX ORDER — BUPIVACAINE HYDROCHLORIDE AND EPINEPHRINE 5; 5 MG/ML; UG/ML
INJECTION, SOLUTION EPIDURAL; INTRACAUDAL; PERINEURAL PRN
Status: DISCONTINUED | OUTPATIENT
Start: 2024-12-30 | End: 2024-12-30 | Stop reason: HOSPADM

## 2024-12-30 RX ORDER — HYDROMORPHONE HYDROCHLORIDE 1 MG/ML
0.5 INJECTION, SOLUTION INTRAMUSCULAR; INTRAVENOUS; SUBCUTANEOUS EVERY 5 MIN PRN
Status: DISCONTINUED | OUTPATIENT
Start: 2024-12-30 | End: 2024-12-30 | Stop reason: HOSPADM

## 2024-12-30 RX ORDER — FENTANYL CITRATE 50 UG/ML
25 INJECTION, SOLUTION INTRAMUSCULAR; INTRAVENOUS EVERY 5 MIN PRN
Status: DISCONTINUED | OUTPATIENT
Start: 2024-12-30 | End: 2024-12-30 | Stop reason: HOSPADM

## 2024-12-30 RX ORDER — OXYCODONE HYDROCHLORIDE 5 MG/1
10 TABLET ORAL PRN
Status: DISCONTINUED | OUTPATIENT
Start: 2024-12-30 | End: 2024-12-30 | Stop reason: HOSPADM

## 2024-12-30 RX ORDER — LIDOCAINE HYDROCHLORIDE 20 MG/ML
INJECTION, SOLUTION INTRAVENOUS
Status: DISCONTINUED | OUTPATIENT
Start: 2024-12-30 | End: 2024-12-30 | Stop reason: SDUPTHER

## 2024-12-30 RX ORDER — METHOCARBAMOL 100 MG/ML
INJECTION, SOLUTION INTRAMUSCULAR; INTRAVENOUS
Status: DISCONTINUED | OUTPATIENT
Start: 2024-12-30 | End: 2024-12-30 | Stop reason: SDUPTHER

## 2024-12-30 RX ORDER — HYDRALAZINE HYDROCHLORIDE 20 MG/ML
10 INJECTION INTRAMUSCULAR; INTRAVENOUS
Status: DISCONTINUED | OUTPATIENT
Start: 2024-12-30 | End: 2024-12-30 | Stop reason: HOSPADM

## 2024-12-30 RX ORDER — SODIUM CHLORIDE 0.9 % (FLUSH) 0.9 %
5-40 SYRINGE (ML) INJECTION PRN
Status: DISCONTINUED | OUTPATIENT
Start: 2024-12-30 | End: 2024-12-30 | Stop reason: HOSPADM

## 2024-12-30 RX ORDER — FENTANYL CITRATE 50 UG/ML
INJECTION, SOLUTION INTRAMUSCULAR; INTRAVENOUS
Status: DISCONTINUED | OUTPATIENT
Start: 2024-12-30 | End: 2024-12-30 | Stop reason: SDUPTHER

## 2024-12-30 RX ORDER — GLYCOPYRROLATE 0.2 MG/ML
INJECTION INTRAMUSCULAR; INTRAVENOUS
Status: DISCONTINUED | OUTPATIENT
Start: 2024-12-30 | End: 2024-12-30 | Stop reason: SDUPTHER

## 2024-12-30 RX ORDER — SUCCINYLCHOLINE/SOD CL,ISO/PF 200MG/10ML
SYRINGE (ML) INTRAVENOUS
Status: DISCONTINUED | OUTPATIENT
Start: 2024-12-30 | End: 2024-12-30 | Stop reason: SDUPTHER

## 2024-12-30 RX ORDER — PROPOFOL 10 MG/ML
INJECTION, EMULSION INTRAVENOUS
Status: DISCONTINUED | OUTPATIENT
Start: 2024-12-30 | End: 2024-12-30 | Stop reason: SDUPTHER

## 2024-12-30 RX ORDER — SODIUM CHLORIDE 9 MG/ML
INJECTION, SOLUTION INTRAVENOUS PRN
Status: DISCONTINUED | OUTPATIENT
Start: 2024-12-30 | End: 2024-12-30 | Stop reason: HOSPADM

## 2024-12-30 RX ORDER — PROCHLORPERAZINE EDISYLATE 5 MG/ML
5 INJECTION INTRAMUSCULAR; INTRAVENOUS
Status: DISCONTINUED | OUTPATIENT
Start: 2024-12-30 | End: 2024-12-30 | Stop reason: HOSPADM

## 2024-12-30 RX ADMIN — LIDOCAINE HYDROCHLORIDE 100 MG: 20 INJECTION, SOLUTION INTRAVENOUS at 07:30

## 2024-12-30 RX ADMIN — GLYCOPYRROLATE 0.2 MG: 0.2 INJECTION INTRAMUSCULAR; INTRAVENOUS at 07:09

## 2024-12-30 RX ADMIN — FENTANYL CITRATE 100 MCG: 50 INJECTION, SOLUTION INTRAMUSCULAR; INTRAVENOUS at 07:44

## 2024-12-30 RX ADMIN — DEXAMETHASONE SODIUM PHOSPHATE 4 MG: 4 INJECTION INTRA-ARTICULAR; INTRALESIONAL; INTRAMUSCULAR; INTRAVENOUS; SOFT TISSUE at 07:44

## 2024-12-30 RX ADMIN — PROPOFOL 120 MG: 10 INJECTION, EMULSION INTRAVENOUS at 07:30

## 2024-12-30 RX ADMIN — WATER 2000 MG: 1 INJECTION INTRAMUSCULAR; INTRAVENOUS; SUBCUTANEOUS at 07:44

## 2024-12-30 RX ADMIN — GLYCOPYRROLATE 0.2 MG: 0.2 INJECTION INTRAMUSCULAR; INTRAVENOUS at 07:52

## 2024-12-30 RX ADMIN — METHOCARBAMOL 250 MG: 100 INJECTION, SOLUTION INTRAMUSCULAR; INTRAVENOUS at 08:35

## 2024-12-30 RX ADMIN — ONDANSETRON 4 MG: 2 INJECTION INTRAMUSCULAR; INTRAVENOUS at 07:44

## 2024-12-30 RX ADMIN — Medication 120 MG: at 07:31

## 2024-12-30 RX ADMIN — SODIUM CHLORIDE, POTASSIUM CHLORIDE, SODIUM LACTATE AND CALCIUM CHLORIDE: 600; 310; 30; 20 INJECTION, SOLUTION INTRAVENOUS at 06:30

## 2024-12-30 ASSESSMENT — PAIN SCALES - GENERAL
PAINLEVEL_OUTOF10: 0
PAINLEVEL_OUTOF10: 0

## 2024-12-30 ASSESSMENT — LIFESTYLE VARIABLES: SMOKING_STATUS: 0

## 2024-12-30 NOTE — PROGRESS NOTES
[employer] rep at bedside.    Paper orders faxed to [facility] operating room for 7/19/22  [facility] orders [facility] [facility] for 7/20-7/22      Paper orders uploaded into patient chart

## 2024-12-30 NOTE — PROGRESS NOTES
Patient arrived to PACU post [employer] SACRAL NERVE STIMULATOR, STAGE I WITH FLUOROSCOPY with Dr. Lozano. VSS on arrival. CRNA gave PACU RN report at bedside stating no complications during procedure. Dressing to surgical site clean, dry and intact. Patient shows no signs of pain at this time. Will continue to monitor.

## 2024-12-30 NOTE — OP NOTE
OPERATIVE REPORT    PATIENT NAME: Rina Mora  :  1945  MED REC NO:  [id number]              DATE OF PROCEDURE:  24    SURGEON:  VAMSHI FOY MD    PREOPERATIVE DIAGNOSIS:  Fecal incontinence.    POSTOPERATIVE DIAGNOSIS:  Fecal incontinence.    PROCEDURE:  [employer] Sacral nerve stimulator implantation, stage I with fluoroscopic guidance and intra-operative programming    ANESTHESIA:  MAC plus local.    ESTIMATED BLOOD LOSS:  minimal    COMPLICATIONS:  None.    INDICATIONS:  The patient is a 79-year-old female.  They presented to the office  with fecal incontinence.  Conservative management attempted with changes in diet as well as fiber  supplementation +/- pelvic floor physical therapy.  Optimal results not achieved and surgical therapy was desired with a sacral  nerve stimulator.  I did have a discussion in the office,  explaining the risks, benefits, expectations, and alternatives of the procedure including  risks of bleeding, infection, need for further operations, potential decrease  in function over time, lead displacement, potential need for battery replacement as well as risks  of anesthesia.  They agreed to proceed.    PROCEDURE DETAILS:  The patient was brought to the operating theatre and was  placed in the prone position.  Sedation was started by anesthesiologist.   Pillow was placed under the lower abdomen to flatten the sacrum and under the  shins to allow the toes to dangle freely.  A ground pad was placed on the  bottom of the patient's foot, and a long test stimulation cable was connected  to the ground pad and the external test stimulator.  The patient was then  prepped and draped in the usual sterile fashion.  Timeout was performed,  confirming the patient's identity as well as the operative site.  Antibiotics  were confirmed to be perfusing.  All safety points were followed.      C-arm fluoroscopy was moved into the AP position to provide

## 2024-12-30 NOTE — PROGRESS NOTES
PACU Transfer to [facility]    Vitals:    12/30/24 1000   BP: (!) 160/58   Pulse: (!) 46   Resp: 12   Temp:    SpO2: 96%         Intake/Output Summary (Last 24 hours) at 12/30/2024 1007  Last data filed at 12/30/2024 0856  Gross per 24 hour   Intake 1000 ml   Output --   Net 1000 ml       Pain assessment:  none  Pain Level: 0    Patient has all belongings at discharge from PACU.  PACU RN called and updated patient's family.     Patient transferred via STRETCHER per VIPIN to care of [facility] RN.

## 2024-12-30 NOTE — PROGRESS NOTES
Ambulatory Surgery/Procedure Discharge Note    Vitals:    12/30/24 1015   BP: (!) 162/59   Pulse: (!) 45   Resp: 14   Temp: 96.9 °F (36.1 °C)   SpO2: 93%      Phase 2 SDS posterior back dsg and belt in place for stim unit, dsg cdi , +1 PP feet warm denies any pain sleepy angela zamudio se flow sheet.      In: 1000 [I.V.:1000]  Out: -     Restroom use offered before discharge.  Yes    Pain assessment:  none  Pain Level: 0/10  1030 reviewed d/c instructions with pt and her daughter both verbalized their understanding. Read instructions with pt's daughter for use/care of stim. Unit. Hand held control with daughter used with instructions. Called PACU and confirmed unit is on . Called device  representative Alexa thru the company and her personal cell # to ta;lk with daughter and confirm  the care, tx and unit is on. There is a note directed to pt/ daughter that she will also call her tomorrow for up-dates      1050 d/c iv  1104  Patient discharged to home/self care. Patient discharged via wheel chair by transporter to waiting family/S.O.   1107 [employer] Rep. Alexa returned the call. [employer] daughters # went to . Explained we were getting pt into car please try again she states she will. She is aware the daughter has many questions.    12/30/2024

## 2024-12-30 NOTE — ANESTHESIA POSTPROCEDURE EVALUATION
Department of Anesthesiology  Postprocedure Note    Patient: Rina Mora  MRN: [id number]  YOB: 1945  Date of evaluation: 12/30/2024    Procedure Summary       Date: 12/30/24 Room / Location: [place] [address] / [facility]    Anesthesia Start: 0725 Anesthesia Stop: 0856    Procedure: [employer] SACRAL NERVE STIMULATOR, STAGE I WITH FLUOROSCOPY Diagnosis:       Incontinence of feces with fecal urgency      Fecal urgency      Chronic diarrhea      (Incontinence of feces with fecal urgency [R15.9, R15.2])      (Fecal urgency [R15.2])      (Chronic diarrhea [K52.9])    Surgeons: Nicholas Lozano MD Responsible Provider: Viji Felton DO    Anesthesia Type: General ASA Status: 2            Anesthesia Type: General    Do Phase I: Do Score: 10    Do Phase II:      Anesthesia Post Evaluation    Patient location during evaluation: PACU  Patient participation: complete - patient participated  Level of consciousness: awake and alert  Pain score: 0  Airway patency: patent  Nausea & Vomiting: no vomiting and no nausea  Cardiovascular status: blood pressure returned to baseline  Respiratory status: acceptable  Hydration status: euvolemic    No notable events documented.

## 2024-12-30 NOTE — ANESTHESIA PRE PROCEDURE
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    Primary osteoarthritis of left knee M17.12    10/21/19 RIGHT TKA M17.11    Encounter for postoperative care Z48.89    Prerenal azotemia R79.89    CKD (chronic kidney disease), stage III ([facility]) N18.30    Gout M10.9    Squamous cell carcinoma of skin C44.92    Vitamin D deficiency E55.9    HTN (hypertension) I10    DM2 (diabetes mellitus, type 2) ([facility]) E11.9    Major depressive disorder, recurrent, mild F33.0    Major depressive disorder, recurrent, moderate F33.1    Major depressive disorder, recurrent, unspecified F33.9    Incontinence of feces with fecal urgency R15.9, R15.2    Fecal urgency R15.2    Chronic diarrhea K52.9    Fecal incontinence R15.9       Past Medical History:        Diagnosis Date    Chronic kidney disease 2019    discharged from this Dr in 2024    CRD (chronic renal disease)     Depression     Herpes dermatitis     left thigh    Hyperlipidemia     Hypertension     Macular degeneration     Obesity     Osteopenia     Squamous acanthoma of face     Type II or unspecified type diabetes mellitus without mention of complication, not stated as uncontrolled        Past Surgical History:        Procedure Laterality Date    ANGIOPLASTY  2003    CHOLECYSTECTOMY  1987    GALLBLADDER SURGERY      Removal-1987    HYSTERECTOMY (CERVIX STATUS UNKNOWN)  1986    TOTAL KNEE ARTHROPLASTY Right 10/21/2019    RIGHT TOTAL KNEE ARTHROPLASTY - DEPUY STANDARD performed by Dima Day MD at

## 2024-12-30 NOTE — DISCHARGE INSTRUCTIONS
**Instructions and wound care per [employer] device representative    [place] AMBULATORY PROCEDURE DISCHARGE INSTRUCTIONS    There are potential side effects of anesthesia or sedation you may experience for the first 24 hours.  These side effects include:    Confusion or Memory loss, Dizziness, or Delayed Reaction Times   [x]A responsible person should be with you for the next 24 hours.  Do not operate any vehicles (automobiles, bicycles, motorcycles) or power tools or machinery for 24 hours.  Do not sign any legal documents or make any legal decisions for 24 hours. Do not drink alcohol for 24 hours or while taking narcotic pain medication.      Nausea    [x]Start with light diet and progress to your normal diet as you feel like eating. However, if you experience nausea or repeated episodes of vomiting which persist beyond 12-24 hours, notify your physician.  Once nausea has passed, remember to keep drinking fluids.    Difficulty Passing Urine  [x]Drink extra amounts of fluid today.  Notify your physician if you have not urinated within 8 hours after your procedure or you feel uncomfortable.      Irritated Throat from a Breathing Tube  [x]Drink extra amounts of fluid today.  Lozenges may help.    Muscle Aches  [x]You may experience some generalized body aches as your muscles recover from medications used to relax them during surgery.  These will gradually subside.    MEDICATION INSTRUCTIONS:  [x]Prescription(S) x  0   sent with you.  Use as directed.  When taking pain medications, you may experience the side effect of dizziness or drowsiness.  Do not drink alcohol or drive when taking these medications.    [x]Give the list of your medications to your primary care physician on your next visit. Keep your med list updated and carry it with in case of emergencies.    [x] Narcotic pain medications can cause the side effect of significant constipation.  You may want to add a stool softener to your postoperative

## 2024-12-30 NOTE — H&P
PRE-OP/PRE-PROCEDURE H&P    Visit Date: 12/30/2024    History:     Rina Mora is a 79 y.o. female who presents today for procedure. See my/PCP/oncologist office notes for indications and details.    Patient Active Problem List:     Hypertension     Osteopenia     Hyperlipidemia     Ventral hernia     Morbid obesity     Macular degeneration     Depression     Type 2 diabetes mellitus with complication, without long-term current use of insulin (HCC)     Idiopathic chronic gout of left foot without tophus     Primary osteoarthritis of left knee     10/21/19 RIGHT TKA     Encounter for postoperative care     Prerenal azotemia     CKD (chronic kidney disease), stage III (HCC)     Gout     Squamous cell carcinoma of skin     Vitamin D deficiency     HTN (hypertension)     DM2 (diabetes mellitus, type 2) (HCC)     Major depressive disorder, recurrent, mild     Major depressive disorder, recurrent, moderate     Major depressive disorder, recurrent, unspecified     Incontinence of feces with fecal urgency     Fecal urgency     Chronic diarrhea     Fecal incontinence         Current Facility-Administered Medications:     lactated ringers infusion, , IntraVENous, Continuous, Dung Huerta MD, Last Rate: 50 mL/hr at 12/30/24 0630, New Bag at 12/30/24 0630    sodium chloride flush 0.9 % injection 5-40 mL, 5-40 mL, IntraVENous, 2 times per day, Nicholas Lozano MD    sodium chloride flush 0.9 % injection 5-40 mL, 5-40 mL, IntraVENous, PRN, Nicholas Lozano MD    0.9 % sodium chloride infusion, , IntraVENous, PRN, Nicholas Lozano MD    ceFAZolin (ANCEF) 2,000 mg in sterile water 20 mL IV syringe, 2,000 mg, IntraVENous, On Call to OR, Nicholas Lozano MD  Prior to Admission medications    Medication Sig Start Date End Date Taking? Authorizing Provider   loperamide (IMODIUM) 2 MG capsule Take 1 capsule by mouth 4 times daily as needed for Diarrhea   Yes Provider, MD Whitney   VITAMIN E PO Take by mouth   Yes Provider,

## 2024-12-31 ENCOUNTER — TELEPHONE (OUTPATIENT)
Dept: SURGERY | Age: 79
End: 2024-12-31

## 2024-12-31 NOTE — TELEPHONE ENCOUNTER
Dianelys, Nurse Reviewer, with [employer] called requesting a clinical team member call [employer] back when they can. They need additional info for the authorization of the sacral nerve stimulator.     [employer]'s call back number is [phone]. Dianelys states we can speak to any Nurse Reviewer, Case #[id number]

## 2025-01-02 NOTE — PROGRESS NOTES
[facility] PRE-SURGICAL TESTING INSTRUCTIONS                      PRIOR TO PROCEDURE DATE:    1. PLEASE FOLLOW ANY INSTRUCTIONS GIVEN TO YOU PER YOUR SURGEON.      2. Arrange for someone to drive you home and be with you for the first 24 hours after discharge for your safety after your procedure for which you received sedation. Ensure it is someone we can share information with regarding your discharge.     NOTE: At this time ONLY 2 ADULTS may accompany you   One person ENCOURAGED to stay at hospital entire time if outpatient surgery      3. You must contact your surgeon for instructions IF:  You are taking any blood thinners, aspirin, anti-inflammatory or vitamins.  There is a change in your physical condition such as a cold, fever, rash, cuts, sores, or any other infection, especially near your surgical site.    4. Do not drink alcohol the day before or day of your procedure.  Do not use any recreational marijuana at least 24 hours or street drugs (heroin, cocaine) at minimum 5 days prior to your procedure.     5. A Pre-Surgical History and Physical MUST be completed WITHIN 30 DAYS OR LESS prior to your procedure.by your Physician or an Urgent Care        THE DAY OF YOUR PROCEDURE:  1.  Follow instructions for ARRIVAL TIME as DIRECTED BY YOUR SURGEON.     2. Enter the MAIN entrance from [facility] and follow the signs to the free Parking Garage or  Parking (offered free of charge 7 am-5pm).      3. Enter the Main Entrance of the hospital (do not enter from the lower level of the parking garage). Upon entrance, check in with the  at the surgical information desk on your LEFT.   Bring your insurance card and photo ID to register      4. DO NOT EAT ANYTHING 8 hours prior to arrival for surgery.  You may have up to 8 ounces of water 4 hours prior to your arrival for surgery.   NOTE: ALL Gastric, Bariatric & Bowel surgery patients - you MUST follow your surgeon's instructions regarding

## 2025-01-02 NOTE — TELEPHONE ENCOUNTER
Spoke to nurse reviewer with [employer] at [phone] case #[id number]. They stated they have received additional documentation and have until 1/10 to make a decision. Nothing else is needed from our office at this time.

## 2025-01-10 ENCOUNTER — TELEPHONE (OUTPATIENT)
Dept: SURGERY | Age: 80
End: 2025-01-10

## 2025-01-10 NOTE — TELEPHONE ENCOUNTER
Spoke with pt's daughter Kassandra to confirm 7:15 sx time, 5:30 arr time, NPO after midnight,  has to be 18 or over to transport home.

## 2025-01-13 ENCOUNTER — HOSPITAL ENCOUNTER (OUTPATIENT)
Age: 80
Setting detail: OUTPATIENT SURGERY
Discharge: HOME | End: 2025-01-13
Attending: SURGERY | Admitting: SURGERY
Payer: MEDICARE

## 2025-01-13 ENCOUNTER — ANESTHESIA (OUTPATIENT)
Dept: OPERATING ROOM | Age: 80
End: 2025-01-13
Payer: MEDICARE

## 2025-01-13 ENCOUNTER — ANESTHESIA EVENT (OUTPATIENT)
Dept: OPERATING ROOM | Age: 80
End: 2025-01-13
Payer: MEDICARE

## 2025-01-13 VITALS
RESPIRATION RATE: 14 BRPM | SYSTOLIC BLOOD PRESSURE: 135 MMHG | HEART RATE: 56 BPM | OXYGEN SATURATION: 100 % | BODY MASS INDEX: 28.2 KG/M2 | DIASTOLIC BLOOD PRESSURE: 59 MMHG | HEIGHT: 64 IN | TEMPERATURE: 97.2 F | WEIGHT: 165.2 LBS

## 2025-01-13 DIAGNOSIS — G89.18 ACUTE POST-OPERATIVE PAIN: Primary | ICD-10-CM

## 2025-01-13 LAB
GLUCOSE BLD-MCNC: 96 MG/DL (ref 70–99)
PERFORMED ON: NORMAL

## 2025-01-13 PROCEDURE — 3600000014 HC SURGERY LEVEL 4 ADDTL 15MIN: Performed by: SURGERY

## 2025-01-13 PROCEDURE — 6360000002 HC RX W HCPCS: Performed by: SURGERY

## 2025-01-13 PROCEDURE — 64590 INS/RPL PRPH SAC/GSTR NPG/R: CPT | Performed by: SURGERY

## 2025-01-13 PROCEDURE — 7100000010 HC PHASE II RECOVERY - FIRST 15 MIN: Performed by: SURGERY

## 2025-01-13 PROCEDURE — C1787 PATIENT PROGR, NEUROSTIM: HCPCS | Performed by: SURGERY

## 2025-01-13 PROCEDURE — 7100000001 HC PACU RECOVERY - ADDTL 15 MIN: Performed by: SURGERY

## 2025-01-13 PROCEDURE — C1820 GENERATOR NEURO RECHG BAT SY: HCPCS | Performed by: SURGERY

## 2025-01-13 PROCEDURE — 95971 ALYS SMPL SP/PN NPGT W/PRGRM: CPT | Performed by: SURGERY

## 2025-01-13 PROCEDURE — 2500000003 HC RX 250 WO HCPCS: Performed by: SURGERY

## 2025-01-13 PROCEDURE — 3700000000 HC ANESTHESIA ATTENDED CARE: Performed by: SURGERY

## 2025-01-13 PROCEDURE — 3700000001 HC ADD 15 MINUTES (ANESTHESIA): Performed by: SURGERY

## 2025-01-13 PROCEDURE — 6360000002 HC RX W HCPCS: Performed by: NURSE ANESTHETIST, CERTIFIED REGISTERED

## 2025-01-13 PROCEDURE — 2500000003 HC RX 250 WO HCPCS: Performed by: NURSE ANESTHETIST, CERTIFIED REGISTERED

## 2025-01-13 PROCEDURE — 2709999900 HC NON-CHARGEABLE SUPPLY: Performed by: SURGERY

## 2025-01-13 PROCEDURE — 3600000004 HC SURGERY LEVEL 4 BASE: Performed by: SURGERY

## 2025-01-13 PROCEDURE — 7100000011 HC PHASE II RECOVERY - ADDTL 15 MIN: Performed by: SURGERY

## 2025-01-13 PROCEDURE — 7100000000 HC PACU RECOVERY - FIRST 15 MIN: Performed by: SURGERY

## 2025-01-13 PROCEDURE — 2580000003 HC RX 258: Performed by: ANESTHESIOLOGY

## 2025-01-13 DEVICE — NEUROSTIMULATOR
Type: IMPLANTABLE DEVICE | Status: FUNCTIONAL
Brand: AXONICS

## 2025-01-13 RX ORDER — HYDROMORPHONE HYDROCHLORIDE 1 MG/ML
0.25 INJECTION, SOLUTION INTRAMUSCULAR; INTRAVENOUS; SUBCUTANEOUS EVERY 5 MIN PRN
Status: DISCONTINUED | OUTPATIENT
Start: 2025-01-13 | End: 2025-01-13 | Stop reason: HOSPADM

## 2025-01-13 RX ORDER — LIDOCAINE HYDROCHLORIDE 20 MG/ML
INJECTION, SOLUTION INTRAVENOUS
Status: DISCONTINUED | OUTPATIENT
Start: 2025-01-13 | End: 2025-01-13 | Stop reason: SDUPTHER

## 2025-01-13 RX ORDER — SODIUM CHLORIDE 0.9 % (FLUSH) 0.9 %
5-40 SYRINGE (ML) INJECTION EVERY 12 HOURS SCHEDULED
Status: DISCONTINUED | OUTPATIENT
Start: 2025-01-13 | End: 2025-01-13 | Stop reason: HOSPADM

## 2025-01-13 RX ORDER — SODIUM CHLORIDE 0.9 % (FLUSH) 0.9 %
5-40 SYRINGE (ML) INJECTION PRN
Status: DISCONTINUED | OUTPATIENT
Start: 2025-01-13 | End: 2025-01-13 | Stop reason: HOSPADM

## 2025-01-13 RX ORDER — GLYCOPYRROLATE 0.2 MG/ML
INJECTION INTRAMUSCULAR; INTRAVENOUS
Status: DISCONTINUED | OUTPATIENT
Start: 2025-01-13 | End: 2025-01-13 | Stop reason: SDUPTHER

## 2025-01-13 RX ORDER — BUPIVACAINE HYDROCHLORIDE AND EPINEPHRINE 5; 5 MG/ML; UG/ML
INJECTION, SOLUTION EPIDURAL; INTRACAUDAL; PERINEURAL PRN
Status: DISCONTINUED | OUTPATIENT
Start: 2025-01-13 | End: 2025-01-13 | Stop reason: HOSPADM

## 2025-01-13 RX ORDER — SODIUM CHLORIDE 9 MG/ML
INJECTION, SOLUTION INTRAVENOUS PRN
Status: DISCONTINUED | OUTPATIENT
Start: 2025-01-13 | End: 2025-01-13 | Stop reason: HOSPADM

## 2025-01-13 RX ORDER — MIDAZOLAM HYDROCHLORIDE 1 MG/ML
2 INJECTION, SOLUTION INTRAMUSCULAR; INTRAVENOUS
Status: DISCONTINUED | OUTPATIENT
Start: 2025-01-13 | End: 2025-01-13 | Stop reason: HOSPADM

## 2025-01-13 RX ORDER — OXYCODONE HYDROCHLORIDE 5 MG/1
5 TABLET ORAL EVERY 6 HOURS PRN
Qty: 28 TABLET | Refills: 0 | Status: SHIPPED | OUTPATIENT
Start: 2025-01-13 | End: 2025-01-20

## 2025-01-13 RX ORDER — DOCUSATE SODIUM 100 MG/1
100 CAPSULE, LIQUID FILLED ORAL 2 TIMES DAILY
Qty: 60 CAPSULE | Refills: 0 | Status: SHIPPED | OUTPATIENT
Start: 2025-01-13 | End: 2025-01-13 | Stop reason: HOSPADM

## 2025-01-13 RX ORDER — SODIUM CHLORIDE, SODIUM LACTATE, POTASSIUM CHLORIDE, CALCIUM CHLORIDE 600; 310; 30; 20 MG/100ML; MG/100ML; MG/100ML; MG/100ML
INJECTION, SOLUTION INTRAVENOUS CONTINUOUS
Status: DISCONTINUED | OUTPATIENT
Start: 2025-01-13 | End: 2025-01-13 | Stop reason: HOSPADM

## 2025-01-13 RX ORDER — PROPOFOL 10 MG/ML
INJECTION, EMULSION INTRAVENOUS
Status: DISCONTINUED | OUTPATIENT
Start: 2025-01-13 | End: 2025-01-13 | Stop reason: SDUPTHER

## 2025-01-13 RX ORDER — LABETALOL HYDROCHLORIDE 5 MG/ML
10 INJECTION, SOLUTION INTRAVENOUS
Status: DISCONTINUED | OUTPATIENT
Start: 2025-01-13 | End: 2025-01-13 | Stop reason: HOSPADM

## 2025-01-13 RX ORDER — LIDOCAINE HYDROCHLORIDE 10 MG/ML
1 INJECTION, SOLUTION EPIDURAL; INFILTRATION; INTRACAUDAL; PERINEURAL
Status: DISCONTINUED | OUTPATIENT
Start: 2025-01-13 | End: 2025-01-13 | Stop reason: HOSPADM

## 2025-01-13 RX ORDER — ONDANSETRON 2 MG/ML
4 INJECTION INTRAMUSCULAR; INTRAVENOUS
Status: DISCONTINUED | OUTPATIENT
Start: 2025-01-13 | End: 2025-01-13 | Stop reason: HOSPADM

## 2025-01-13 RX ORDER — ONDANSETRON 2 MG/ML
INJECTION INTRAMUSCULAR; INTRAVENOUS
Status: DISCONTINUED | OUTPATIENT
Start: 2025-01-13 | End: 2025-01-13 | Stop reason: SDUPTHER

## 2025-01-13 RX ORDER — FENTANYL CITRATE 50 UG/ML
INJECTION, SOLUTION INTRAMUSCULAR; INTRAVENOUS
Status: DISCONTINUED | OUTPATIENT
Start: 2025-01-13 | End: 2025-01-13 | Stop reason: SDUPTHER

## 2025-01-13 RX ORDER — MAGNESIUM HYDROXIDE 1200 MG/15ML
LIQUID ORAL CONTINUOUS PRN
Status: DISCONTINUED | OUTPATIENT
Start: 2025-01-13 | End: 2025-01-13 | Stop reason: HOSPADM

## 2025-01-13 RX ORDER — FENTANYL CITRATE 50 UG/ML
50 INJECTION, SOLUTION INTRAMUSCULAR; INTRAVENOUS EVERY 5 MIN PRN
Status: DISCONTINUED | OUTPATIENT
Start: 2025-01-13 | End: 2025-01-13 | Stop reason: HOSPADM

## 2025-01-13 RX ORDER — ROCURONIUM BROMIDE 10 MG/ML
INJECTION, SOLUTION INTRAVENOUS
Status: DISCONTINUED | OUTPATIENT
Start: 2025-01-13 | End: 2025-01-13 | Stop reason: SDUPTHER

## 2025-01-13 RX ORDER — METOCLOPRAMIDE HYDROCHLORIDE 5 MG/ML
10 INJECTION INTRAMUSCULAR; INTRAVENOUS
Status: DISCONTINUED | OUTPATIENT
Start: 2025-01-13 | End: 2025-01-13 | Stop reason: HOSPADM

## 2025-01-13 RX ORDER — NALOXONE HYDROCHLORIDE 0.4 MG/ML
INJECTION, SOLUTION INTRAMUSCULAR; INTRAVENOUS; SUBCUTANEOUS PRN
Status: DISCONTINUED | OUTPATIENT
Start: 2025-01-13 | End: 2025-01-13 | Stop reason: HOSPADM

## 2025-01-13 RX ADMIN — SUGAMMADEX 200 MG: 100 INJECTION, SOLUTION INTRAVENOUS at 07:46

## 2025-01-13 RX ADMIN — FENTANYL CITRATE 50 MCG: 50 INJECTION, SOLUTION INTRAMUSCULAR; INTRAVENOUS at 07:49

## 2025-01-13 RX ADMIN — LIDOCAINE HYDROCHLORIDE 80 MG: 20 INJECTION, SOLUTION INTRAVENOUS at 07:14

## 2025-01-13 RX ADMIN — GLYCOPYRROLATE 0.2 MG: 0.2 INJECTION INTRAMUSCULAR; INTRAVENOUS at 07:38

## 2025-01-13 RX ADMIN — PROPOFOL 40 MG: 10 INJECTION, EMULSION INTRAVENOUS at 07:49

## 2025-01-13 RX ADMIN — ROCURONIUM BROMIDE 40 MG: 10 INJECTION, SOLUTION INTRAVENOUS at 07:14

## 2025-01-13 RX ADMIN — ONDANSETRON 4 MG: 2 INJECTION INTRAMUSCULAR; INTRAVENOUS at 07:38

## 2025-01-13 RX ADMIN — GLYCOPYRROLATE 0.1 MG: 0.2 INJECTION INTRAMUSCULAR; INTRAVENOUS at 07:21

## 2025-01-13 RX ADMIN — WATER 2000 MG: 1 INJECTION INTRAMUSCULAR; INTRAVENOUS; SUBCUTANEOUS at 07:24

## 2025-01-13 RX ADMIN — PROPOFOL 100 MG: 10 INJECTION, EMULSION INTRAVENOUS at 07:14

## 2025-01-13 RX ADMIN — SODIUM CHLORIDE, SODIUM LACTATE, POTASSIUM CHLORIDE, AND CALCIUM CHLORIDE: .6; .31; .03; .02 INJECTION, SOLUTION INTRAVENOUS at 06:28

## 2025-01-13 RX ADMIN — FENTANYL CITRATE 50 MCG: 50 INJECTION, SOLUTION INTRAMUSCULAR; INTRAVENOUS at 07:14

## 2025-01-13 ASSESSMENT — PAIN - FUNCTIONAL ASSESSMENT: PAIN_FUNCTIONAL_ASSESSMENT: NONE - DENIES PAIN

## 2025-01-13 ASSESSMENT — ENCOUNTER SYMPTOMS: SHORTNESS OF BREATH: 0

## 2025-01-13 ASSESSMENT — PAIN SCALES - GENERAL
PAINLEVEL_OUTOF10: 0
PAINLEVEL_OUTOF10: 0

## 2025-01-13 ASSESSMENT — LIFESTYLE VARIABLES: SMOKING_STATUS: 0

## 2025-01-13 NOTE — DISCHARGE INSTRUCTIONS
[facility] AMBULATORY PROCEDURE DISCHARGE INSTRUCTIONS    There are potential side effects of anesthesia or sedation you may experience for the first 24 hours.  These side effects include:    Confusion or Memory loss, Dizziness, or Delayed Reaction Times   [x]A responsible person should be with you for the next 24 hours.  Do not operate any vehicles (automobiles, bicycles, motorcycles) or power tools or machinery for 24 hours.  Do not sign any legal documents or make any legal decisions for 24 hours. Do not drink alcohol for 24 hours or while taking narcotic pain medication.      Nausea    [x]Start with light diet and progress to your normal diet as you feel like eating. However, if you experience nausea or repeated episodes of vomiting which persist beyond 12-24 hours, notify your physician.  Once nausea has passed, remember to keep drinking fluids.    Difficulty Passing Urine  [x]Drink extra amounts of fluid today.  Notify your physician if you have not urinated within 8 hours after your procedure or you feel uncomfortable.      Irritated Throat from a Breathing Tube  [x]Drink extra amounts of fluid today.  Lozenges may help.    Muscle Aches  [x]You may experience some generalized body aches as your muscles recover from medications used to relax them during surgery.  These will gradually subside.    MEDICATION INSTRUCTIONS:  []Prescription(S) x     sent with you.  Use as directed.  When taking pain medications, you may experience the side effect of dizziness or drowsiness.  Do not drink alcohol or drive when taking these medications.  [x]Prescription(S) x      1    Called to [facility] Name and location:    [x]Give the list of your medications to your primary care physician on your next visit. Keep your med list updated and carry it with in case of emergencies.    [x] Narcotic pain medications can cause the side effect of significant constipation.  You may want to add a stool softener to your

## 2025-01-13 NOTE — PROGRESS NOTES
Ambulatory Surgery/Procedure Discharge Note    Vitals:    01/13/25 0854   BP: (!) 135/59   Pulse: 56   Resp: 14   Temp: 97.2 °F (36.2 °C)   SpO2: 100%   Arrived to phase 2 SDS  see flow sheet , assessment sheet.  Rt posterior flank area surgical glue to site approximated denies any pain      In: 875 [P.O.:100; I.V.:775]  Out: 25     Restroom use offered before discharge.  Yes    Pain assessment:  none  Pain Level: 0    0910  Reviewed d/c instructions with daughter and pt both verbalized their understanding    0925  D/c iv   0928 up helped to get dressed.   Daughter aware they device rep. Will be in touch with her in the next few hrs.        0930  Patient discharged to home/self care. Patient discharged via wheel chair by transporter to waiting family/S.O.       1/13/2025

## 2025-01-13 NOTE — ANESTHESIA PRE PROCEDURE
10/30/2024 11:56 AM    K 4.6 10/30/2024 11:56 AM     10/30/2024 11:56 AM    CO2 29 10/30/2024 11:56 AM    BUN 25 10/30/2024 11:56 AM    CREATININE 1.2 10/30/2024 11:56 AM    GFRAA 48 09/15/2022 01:14 PM    AGRATIO 1.9 10/30/2024 11:56 AM    LABGLOM 46 10/30/2024 11:56 AM    LABGLOM 46 03/07/2024 07:41 AM    GLUCOSE 75 10/30/2024 11:56 AM    CALCIUM 9.3 10/30/2024 11:56 AM    BILITOT 0.6 10/30/2024 11:56 AM    ALKPHOS 118 10/30/2024 11:56 AM    AST 17 10/30/2024 11:56 AM    ALT 9 10/30/2024 11:56 AM       POC Tests:   No results for input(s): \"POCGLU\", \"POCNA\", \"POCK\", \"POCCL\", \"POCBUN\", \"POCHEMO\", \"POCHCT\" in the last 72 hours.      Coags:   Lab Results   Component Value Date/Time    PROTIME 10.9 10/04/2019 09:52 AM    INR 0.96 10/04/2019 09:52 AM    APTT 30.7 10/04/2019 09:52 AM       HCG (If Applicable): No results found for: \"PREGTESTUR\", \"PREGSERUM\", \"HCG\", \"HCGQUANT\"     ABGs: No results found for: \"PHART\", \"PO2ART\", \"PAZ2LNT\", \"AUI5GYG\", \"BEART\", \"K0RINRIT\"     Type & Screen (If Applicable):  Lab Results   Component Value Date    ABORH O POS 10/21/2019    LABANTI NEG 10/21/2019       Drug/Infectious Status (If Applicable):  No results found for: \"HIV\", \"HEPCAB\"    COVID-19 Screening (If Applicable): No results found for: \"COVID19\"        Anesthesia Evaluation  Patient summary reviewed and Nursing notes reviewed   no history of anesthetic complications:   Airway: Mallampati: II  TM distance: >3 FB   Neck ROM: full  Mouth opening: > = 3 FB   Dental: normal exam         Pulmonary: breath sounds clear to auscultation      (-) COPD, asthma, shortness of breath, sleep apnea and not a current smoker                           Cardiovascular:  Exercise tolerance: good (>4 METS)  (+) hypertension:, hyperlipidemia    (-) past MI, CAD, CABG/stent, dysrhythmias,  angina,  CHF and orthopnea      Rhythm: regular  Rate: normal           Beta Blocker:  Dose within 24 Hrs      ROS comment: Denies CP/SOB  Bradycardia-on BB

## 2025-01-13 NOTE — ANESTHESIA POSTPROCEDURE EVALUATION
Department of Anesthesiology  Postprocedure Note    Patient: Rina Mora  MRN: [id number]  YOB: 1945  Date of evaluation: 1/13/2025    Procedure Summary       Date: 01/13/25 Room / Location: [address]    Anesthesia Start: 0709 Anesthesia Stop: 0807    Procedure: AXONICS SACRAL NERVE STIMULATOR INSERTION STAGE II (Right: Sacrum) Diagnosis:       Fecal urgency      Fecal incontinence      Chronic diarrhea      (Fecal urgency [R15.2])      (Fecal incontinence [R15.9])      (Chronic diarrhea [K52.9])    Surgeons: Nicholas Lozano MD Responsible Provider: Acosta Olvera MD    Anesthesia Type: general ASA Status: 2            Anesthesia Type: No value filed.    Do Phase I: Do Score: 10    Do Phase II: Do Score: 10    Anesthesia Post Evaluation    Patient location during evaluation: PACU  Level of consciousness: awake and alert  Airway patency: patent  Nausea & Vomiting: no vomiting  Cardiovascular status: blood pressure returned to baseline  Respiratory status: acceptable  Hydration status: euvolemic  Multimodal analgesia pain management approach  Pain management: adequate    No notable events documented.

## 2025-01-13 NOTE — PROGRESS NOTES
Pt arrived from OR, s/p AXONICS SACRAL NERVE STIMULATOR INSERTION STAGE II - Right , report received from CRNA , OR RN and Dr. Andino.  Pt sleepy but awakens easily, denies pain

## 2025-01-13 NOTE — OP NOTE
Rina Mora  [id number]    [facility]    DATE OF PROCEDURE: 1/13/25     SURGEON: Nicholas Lozano MD     PREOPERATIVE DIAGNOSIS: Fecal incontinence.     POSTOPERATIVE DIAGNOSIS: Fecal incontinence.     PROCEDURES:  1. Sacral nerve stimulator implantation, stage II.   2. Electrical analysis of neurostimulator and programming.    EBL: Minimal    SPECIMENS: None     Findings:  Infection Present At Time Of Surgery (PATOS) (choose all levels that have infection present):  No infection present  Other Findings: N/A      INDICATIONS: The patient is a 79-year-old female who suffers from fecal  incontinence. They underwent stage I sacral nerve stimulator with test leads 2  week ago and had a good outcome as measured by decreased number of  incontinence episodes. They desired to have the permanent implantation of the  neurostimulator. I discussed the risks, benefits and alternatives to  procedure including the risk of bleeding, infection, need for further  operations, potential lead displacement, device malfunction, potential need for battery reimplantation.   They understood all of these risks and agreed to proceed.     PROCEDURE DETAILS: The patient was brought to the operating theatre and was  placed in the prone position. Sedation was started by anesthesiology. The  bandages were removed from the previous operation. The patient's upper  buttock was prepped and draped in usual sterile fashion using chlorhexidine  prep solution. Timeout was performed confirming patient identity as well as  the operative site. Antibiotics were confirmed to be perfusing. All safe  points were followed. SCDs were on and functioning.     We began by using local anesthetic around the area of the previous incision.   A 15 blade scalpel was then used to incise into the previous incision site for  the neurostimulator pocket. Dissection was continued down to the subcutaneous  tissue and the previous wound was opened. Next, the

## 2025-02-11 ENCOUNTER — OFFICE VISIT (OUTPATIENT)
Dept: SURGERY | Age: 80
End: 2025-02-11
Payer: MEDICARE

## 2025-02-11 DIAGNOSIS — R15.9 INCONTINENCE OF FECES WITH FECAL URGENCY: Primary | ICD-10-CM

## 2025-02-11 DIAGNOSIS — R15.2 INCONTINENCE OF FECES WITH FECAL URGENCY: Primary | ICD-10-CM

## 2025-02-11 PROCEDURE — 95971 ALYS SMPL SP/PN NPGT W/PRGRM: CPT | Performed by: SURGERY

## 2025-02-11 PROCEDURE — 99024 POSTOP FOLLOW-UP VISIT: CPT | Performed by: SURGERY

## 2025-02-11 NOTE — PROGRESS NOTES
Overall doing okay 2 weeks postop from sacral nerve stimulator.  Stimulation increased by 0.1.  Impedance check okay.  Battery check okay. Program #2.

## 2025-03-28 ENCOUNTER — TELEPHONE (OUTPATIENT)
Dept: FAMILY MEDICINE CLINIC | Age: 80
End: 2025-03-28

## 2025-03-28 DIAGNOSIS — S72.009A CLOSED FRACTURE OF HIP, UNSPECIFIED LATERALITY, INITIAL ENCOUNTER: Primary | ICD-10-CM

## 2025-03-28 NOTE — TELEPHONE ENCOUNTER
Patient is an 79-year-old female, who recently had a hip fracture.  Because of this, patient has been in bed for the past 5 weeks.  In addition, the patient has a cardiac condition that results in her having hypotension with certain body positions.  Because of this, the patient requires frequent repositioning in order to maintain an appropriate blood pressure.  The ability of the head and legs to move on the hospital bed are medically necessary to be able to maintain the appropriate blood pressure.  This positioning is not able to be achieved with a standard bed.  The adjustable head, adjustable feet, and adjustable overall height will be helpful for repositioning purposes, help with activities of daily living. In addition, patient will likely need home health care for assistance with ADLs, any dressing changes and further help of the patient may need.

## 2025-04-04 ENCOUNTER — TELEPHONE (OUTPATIENT)
Dept: FAMILY MEDICINE CLINIC | Age: 80
End: 2025-04-04

## 2025-04-04 NOTE — TELEPHONE ENCOUNTER
Lenin Cruz from [facility]  Pt is no longer on bisoprolol-hydroCHLOROthiazide (ZIAC) 10-6.25 MG per tablet and     The hospital took Pt off these two medications. Have updated the med list to reflect this

## 2025-04-04 NOTE — TELEPHONE ENCOUNTER
If the patient's heart rate is less than 50 bpm, then hold the bisoprolol/hydrochlorothiazide.  If the patient's systolic blood pressure is less than 100, hold both the bisoprolol/hydrochlorothiazide and the lisinopril.

## 2025-04-04 NOTE — TELEPHONE ENCOUNTER
Called and gave verbal OKAY for nursing, PT and OT.   Nancy from [facility] was asking about parameters on meds. Please advise

## 2025-04-04 NOTE — TELEPHONE ENCOUNTER
These are usually the things that are resolved with a hospital follow-up visit, please schedule the patient for a hospital follow-up visit.

## 2025-04-08 DIAGNOSIS — F03.94 DEMENTIA WITH ANXIETY, UNSPECIFIED DEMENTIA SEVERITY, UNSPECIFIED DEMENTIA TYPE (HCC): ICD-10-CM

## 2025-04-08 RX ORDER — DONEPEZIL HYDROCHLORIDE 5 MG/1
5 TABLET, FILM COATED ORAL NIGHTLY
Qty: 90 TABLET | Refills: 0 | Status: SHIPPED | OUTPATIENT
Start: 2025-04-08

## 2025-04-08 NOTE — TELEPHONE ENCOUNTER
Last Office Visit  -  10/30/2024  Next Office Visit  -  04/30/2025    Last Filled  -    Last UDS -    Contract -

## 2025-04-17 ENCOUNTER — TELEPHONE (OUTPATIENT)
Dept: FAMILY MEDICINE CLINIC | Age: 80
End: 2025-04-17

## 2025-04-17 NOTE — TELEPHONE ENCOUNTER
Received call from Larisa Stockton [facility]. She states patient was prescribed Midodrine 5 mg while in the hospital. Patient BP today after taking medication was 168/82 sitting , 170/84 after standing. Kath would like to know if patient should discontinue since there was not a drop while standing or have daughter take BP for patient sitting and standing before giving medication.   Requesting advise    [facility] f/u 4/30/25

## 2025-04-17 NOTE — TELEPHONE ENCOUNTER
This is correct, midodrine is typically given for low blood pressure or orthostatic low blood pressure.  If there is not changes between sitting and standing, and both of these numbers are high, which seems to be the case, then patient should not be receiving midodrine.

## 2025-04-22 ENCOUNTER — TELEPHONE (OUTPATIENT)
Dept: FAMILY MEDICINE CLINIC | Age: 80
End: 2025-04-22

## 2025-04-22 NOTE — TELEPHONE ENCOUNTER
[facility] called and stated that pt is having black tar bm since Friday. They are requesting order for stool sample to test for hemoccult and cdiff? Her bp sitting was 149/81 9:38 am. Standing 123/68 9:39 am.

## 2025-04-24 RX ORDER — DICYCLOMINE HCL 20 MG
20 TABLET ORAL 3 TIMES DAILY
Qty: 120 TABLET | Refills: 0 | Status: SHIPPED | OUTPATIENT
Start: 2025-04-24

## 2025-04-24 NOTE — TELEPHONE ENCOUNTER
Refill sent in for bentyl but as stated previously if she is having black stools she needs to be seen at the ER.

## 2025-04-24 NOTE — TELEPHONE ENCOUNTER
Kath- with [facility] called. Pt still having issues with loose stools. Wants to know if dicyclomine (BENTYL) 20 MG tablet can be called in

## 2025-04-28 SDOH — ECONOMIC STABILITY: FOOD INSECURITY: WITHIN THE PAST 12 MONTHS, YOU WORRIED THAT YOUR FOOD WOULD RUN OUT BEFORE YOU GOT MONEY TO BUY MORE.: NEVER TRUE

## 2025-04-28 SDOH — ECONOMIC STABILITY: INCOME INSECURITY: IN THE LAST 12 MONTHS, WAS THERE A TIME WHEN YOU WERE NOT ABLE TO PAY THE MORTGAGE OR RENT ON TIME?: NO

## 2025-04-28 SDOH — ECONOMIC STABILITY: TRANSPORTATION INSECURITY
IN THE PAST 12 MONTHS, HAS THE LACK OF TRANSPORTATION KEPT YOU FROM MEDICAL APPOINTMENTS OR FROM GETTING MEDICATIONS?: NO

## 2025-04-28 SDOH — ECONOMIC STABILITY: TRANSPORTATION INSECURITY
IN THE PAST 12 MONTHS, HAS LACK OF TRANSPORTATION KEPT YOU FROM MEETINGS, WORK, OR FROM GETTING THINGS NEEDED FOR DAILY LIVING?: NO

## 2025-04-28 SDOH — ECONOMIC STABILITY: FOOD INSECURITY: WITHIN THE PAST 12 MONTHS, THE FOOD YOU BOUGHT JUST DIDN'T LAST AND YOU DIDN'T HAVE MONEY TO GET MORE.: NEVER TRUE

## 2025-04-28 ASSESSMENT — PATIENT HEALTH QUESTIONNAIRE - PHQ9
4. FEELING TIRED OR HAVING LITTLE ENERGY: SEVERAL DAYS
5. POOR APPETITE OR OVEREATING: SEVERAL DAYS
SUM OF ALL RESPONSES TO PHQ QUESTIONS 1-9: 3
SUM OF ALL RESPONSES TO PHQ QUESTIONS 1-9: 3
7. TROUBLE CONCENTRATING ON THINGS, SUCH AS READING THE NEWSPAPER OR WATCHING TELEVISION: SEVERAL DAYS
8. MOVING OR SPEAKING SO SLOWLY THAT OTHER PEOPLE COULD HAVE NOTICED. OR THE OPPOSITE - BEING SO FIDGETY OR RESTLESS THAT YOU HAVE BEEN MOVING AROUND A LOT MORE THAN USUAL: NOT AT ALL
3. TROUBLE FALLING OR STAYING ASLEEP: NOT AT ALL
2. FEELING DOWN, DEPRESSED OR HOPELESS: NOT AT ALL
1. LITTLE INTEREST OR PLEASURE IN DOING THINGS: NOT AT ALL
9. THOUGHTS THAT YOU WOULD BE BETTER OFF DEAD, OR OF HURTING YOURSELF: NOT AT ALL
1. LITTLE INTEREST OR PLEASURE IN DOING THINGS: NOT AT ALL
7. TROUBLE CONCENTRATING ON THINGS, SUCH AS READING THE NEWSPAPER OR WATCHING TELEVISION: SEVERAL DAYS
10. IF YOU CHECKED OFF ANY PROBLEMS, HOW DIFFICULT HAVE THESE PROBLEMS MADE IT FOR YOU TO DO YOUR WORK, TAKE CARE OF THINGS AT HOME, OR GET ALONG WITH OTHER PEOPLE: SOMEWHAT DIFFICULT
6. FEELING BAD ABOUT YOURSELF - OR THAT YOU ARE A FAILURE OR HAVE LET YOURSELF OR YOUR FAMILY DOWN: NOT AT ALL
8. MOVING OR SPEAKING SO SLOWLY THAT OTHER PEOPLE COULD HAVE NOTICED. OR THE OPPOSITE, BEING SO FIGETY OR RESTLESS THAT YOU HAVE BEEN MOVING AROUND A LOT MORE THAN USUAL: NOT AT ALL
2. FEELING DOWN, DEPRESSED OR HOPELESS: NOT AT ALL
4. FEELING TIRED OR HAVING LITTLE ENERGY: SEVERAL DAYS
SUM OF ALL RESPONSES TO PHQ QUESTIONS 1-9: 3
SUM OF ALL RESPONSES TO PHQ QUESTIONS 1-9: 3
10. IF YOU CHECKED OFF ANY PROBLEMS, HOW DIFFICULT HAVE THESE PROBLEMS MADE IT FOR YOU TO DO YOUR WORK, TAKE CARE OF THINGS AT HOME, OR GET ALONG WITH OTHER PEOPLE: SOMEWHAT DIFFICULT
5. POOR APPETITE OR OVEREATING: SEVERAL DAYS
6. FEELING BAD ABOUT YOURSELF - OR THAT YOU ARE A FAILURE OR HAVE LET YOURSELF OR YOUR FAMILY DOWN: NOT AT ALL
9. THOUGHTS THAT YOU WOULD BE BETTER OFF DEAD, OR OF HURTING YOURSELF: NOT AT ALL
3. TROUBLE FALLING OR STAYING ASLEEP: NOT AT ALL
SUM OF ALL RESPONSES TO PHQ QUESTIONS 1-9: 3

## 2025-04-30 ENCOUNTER — OFFICE VISIT (OUTPATIENT)
Dept: FAMILY MEDICINE CLINIC | Age: 80
End: 2025-04-30
Payer: MEDICARE

## 2025-04-30 VITALS
OXYGEN SATURATION: 98 % | HEART RATE: 72 BPM | HEIGHT: 64 IN | BODY MASS INDEX: 28.36 KG/M2 | DIASTOLIC BLOOD PRESSURE: 66 MMHG | SYSTOLIC BLOOD PRESSURE: 126 MMHG

## 2025-04-30 DIAGNOSIS — E78.5 DYSLIPIDEMIA: ICD-10-CM

## 2025-04-30 DIAGNOSIS — R19.7 DIARRHEA, UNSPECIFIED TYPE: ICD-10-CM

## 2025-04-30 DIAGNOSIS — K21.9 GASTROESOPHAGEAL REFLUX DISEASE WITHOUT ESOPHAGITIS: ICD-10-CM

## 2025-04-30 DIAGNOSIS — E11.22 TYPE 2 DIABETES MELLITUS WITH CHRONIC KIDNEY DISEASE, WITHOUT LONG-TERM CURRENT USE OF INSULIN, UNSPECIFIED CKD STAGE (HCC): Primary | ICD-10-CM

## 2025-04-30 DIAGNOSIS — N18.30 STAGE 3 CHRONIC KIDNEY DISEASE, UNSPECIFIED WHETHER STAGE 3A OR 3B CKD (HCC): ICD-10-CM

## 2025-04-30 DIAGNOSIS — F33.1 MAJOR DEPRESSIVE DISORDER, RECURRENT, MODERATE (HCC): ICD-10-CM

## 2025-04-30 LAB — HBA1C MFR BLD: 4.3 %

## 2025-04-30 PROCEDURE — 1159F MED LIST DOCD IN RCRD: CPT | Performed by: STUDENT IN AN ORGANIZED HEALTH CARE EDUCATION/TRAINING PROGRAM

## 2025-04-30 PROCEDURE — G2211 COMPLEX E/M VISIT ADD ON: HCPCS | Performed by: STUDENT IN AN ORGANIZED HEALTH CARE EDUCATION/TRAINING PROGRAM

## 2025-04-30 PROCEDURE — 3078F DIAST BP <80 MM HG: CPT | Performed by: STUDENT IN AN ORGANIZED HEALTH CARE EDUCATION/TRAINING PROGRAM

## 2025-04-30 PROCEDURE — 83036 HEMOGLOBIN GLYCOSYLATED A1C: CPT | Performed by: STUDENT IN AN ORGANIZED HEALTH CARE EDUCATION/TRAINING PROGRAM

## 2025-04-30 PROCEDURE — 3044F HG A1C LEVEL LT 7.0%: CPT | Performed by: STUDENT IN AN ORGANIZED HEALTH CARE EDUCATION/TRAINING PROGRAM

## 2025-04-30 PROCEDURE — 1123F ACP DISCUSS/DSCN MKR DOCD: CPT | Performed by: STUDENT IN AN ORGANIZED HEALTH CARE EDUCATION/TRAINING PROGRAM

## 2025-04-30 PROCEDURE — 99214 OFFICE O/P EST MOD 30 MIN: CPT | Performed by: STUDENT IN AN ORGANIZED HEALTH CARE EDUCATION/TRAINING PROGRAM

## 2025-04-30 PROCEDURE — 3074F SYST BP LT 130 MM HG: CPT | Performed by: STUDENT IN AN ORGANIZED HEALTH CARE EDUCATION/TRAINING PROGRAM

## 2025-04-30 RX ORDER — ASPIRIN 81 MG/1
81 TABLET, CHEWABLE ORAL DAILY
Qty: 90 TABLET | Refills: 3 | Status: SHIPPED | OUTPATIENT
Start: 2025-04-30

## 2025-04-30 RX ORDER — FAMOTIDINE 20 MG/1
20 TABLET, FILM COATED ORAL 2 TIMES DAILY
Qty: 180 TABLET | Refills: 3 | Status: SHIPPED | OUTPATIENT
Start: 2025-04-30

## 2025-04-30 RX ORDER — FOLIC ACID 1 MG/1
1 TABLET ORAL DAILY
COMMUNITY
Start: 2025-03-08 | End: 2025-04-30 | Stop reason: SDUPTHER

## 2025-04-30 RX ORDER — FAMOTIDINE 20 MG/1
20 TABLET, FILM COATED ORAL
COMMUNITY
Start: 2025-04-03 | End: 2025-04-30 | Stop reason: SDUPTHER

## 2025-04-30 RX ORDER — ASPIRIN 81 MG/1
81 TABLET, CHEWABLE ORAL DAILY
COMMUNITY
End: 2025-04-30 | Stop reason: SDUPTHER

## 2025-04-30 RX ORDER — DIPHENOXYLATE HYDROCHLORIDE AND ATROPINE SULFATE 2.5; .025 MG/1; MG/1
1 TABLET ORAL 4 TIMES DAILY PRN
Qty: 30 TABLET | Refills: 0 | Status: SHIPPED | OUTPATIENT
Start: 2025-04-30 | End: 2025-05-10

## 2025-04-30 RX ORDER — FOLIC ACID 1 MG/1
1 TABLET ORAL DAILY
Qty: 90 TABLET | Refills: 3 | Status: SHIPPED | OUTPATIENT
Start: 2025-04-30

## 2025-04-30 NOTE — PROGRESS NOTES
report any side effects.  She does not request a refill of this medication at this time.    Patient has chronic GERD.  She is on Pepcid for this.  This medication works well for her and she does not report any side effects.  She does request a refill of this medication at this time.    Patient has hyperlipidemia.  She is on pravastatin 40 mg daily.  This medication works well for her and she does not report any side effects.  She does not request a refill of this medication at this time.    Patient also has recently had an increase in her diarrhea.  She had previously had a chronic diarrhea, was given dicyclomine, but diarrheal symptoms have been persistent.  They have a follow-up scheduled with the gastroenterologist on May 8 and are wondering if there is anything else that they can do besides Imodium and dicyclomine.  Patient has not yet made attempts at a prescription medication such as Lomotil nor has she made an attempt at best with sulfate.    Review of Systems   All other systems reviewed and are negative.         Objective   Vitals:    04/30/25 1554   BP: 126/66   Pulse: 72   SpO2: 98%   Height: 1.626 m (5' 4\")       Physical Exam  Vitals reviewed.   Constitutional:       General: She is not in acute distress.     Appearance: Normal appearance. She is not ill-appearing, toxic-appearing or diaphoretic.   HENT:      Mouth/Throat:      Mouth: Mucous membranes are moist.   Eyes:      General: No scleral icterus.     Conjunctiva/sclera: Conjunctivae normal.   Cardiovascular:      Rate and Rhythm: Normal rate.   Pulmonary:      Effort: Pulmonary effort is normal.   Abdominal:      General: There is no distension.   Skin:     General: Skin is dry.      Coloration: Skin is not jaundiced or pale.   Neurological:      Mental Status: She is alert. Mental status is at baseline.   Psychiatric:         Behavior: Behavior normal.         Thought Content: Thought content normal.                  An electronic signature was

## 2025-05-01 PROBLEM — K21.9 GASTROESOPHAGEAL REFLUX DISEASE WITHOUT ESOPHAGITIS: Status: ACTIVE | Noted: 2025-05-01

## 2025-05-01 NOTE — ASSESSMENT & PLAN NOTE
Chronic, at goal (stable), continue current treatment plan    Orders:    famotidine (PEPCID) 20 MG tablet; Take 1 tablet by mouth 2 times daily

## 2025-05-12 ENCOUNTER — PATIENT MESSAGE (OUTPATIENT)
Dept: FAMILY MEDICINE CLINIC | Age: 80
End: 2025-05-12

## 2025-05-12 DIAGNOSIS — R19.7 DIARRHEA, UNSPECIFIED TYPE: ICD-10-CM

## 2025-05-12 RX ORDER — DIPHENOXYLATE HYDROCHLORIDE AND ATROPINE SULFATE 2.5; .025 MG/1; MG/1
1 TABLET ORAL 4 TIMES DAILY PRN
Qty: 30 TABLET | Refills: 0 | Status: SHIPPED | OUTPATIENT
Start: 2025-05-12 | End: 2025-05-22

## 2025-05-12 NOTE — TELEPHONE ENCOUNTER
Last Office Visit  -  4/30/25  Next Office Visit  -  n/a    Last Filled  -    Last UDS -    Contract -

## 2025-05-21 ENCOUNTER — TELEPHONE (OUTPATIENT)
Dept: FAMILY MEDICINE CLINIC | Age: 80
End: 2025-05-21

## 2025-05-21 NOTE — PROGRESS NOTES
Rina M Beiser    Age 80 y.o.    female    1945    MRN [id number]    5/23/2025  Arrival Time_____________  OR Time____________30 Min     Procedure(s):  COLONOSCOPY                      Monitor Anesthesia Care    Surgeon(s):  AnastaciaRicki tinajero, MD       Phone [phone] (home)     Initals  Date  Info Source  Home  Cell         Work  _____________________________________________________________________  _____________________________________________________________________  _____________________________________________________________________  _____________________________________________________________________  _____________________________________________________________________    PCP _____________________________ Phone_________________     H&P  ________________  Bringing      Chart              Epic      DOS      Called________  EKG ________________   Bringing      Chart              Epic      DOS      Called________  LABS________________   Bringing     Chart              Epic      DOS      Called________  Cardiac Clearance ______ Bringing      Chart              Epic      DOS      Called________  Pulmonary Clearance____ Bringing      Chart              Epic      DOS      Called________    Cardiologist________________________ Phone___________________________  Pulmonologist_______________________Phone___________________________      ? Blood Refusal / Waiver on Chart            [employer]________________  ? Pre-op Instructions Given /Understood          _________________________________  ? Directions to Surgery Center                          _________________________________  ? Transportation Home_______________      __________________________________  ? Crutches/Walker__________________        __________________________________    Orders: Hard copy/ EPIC                 Transcribed/ EPIC                  ________Pharmacy                         ________WEIGHT    COVID + _______DATE    ___OK per

## 2025-05-21 NOTE — PROGRESS NOTES
Date and time of surgery :  5/23/25 11:00            Arrival Time:  10:00     Bring Picture ID and insurance card.  Please wear simple, loose fitting clothing to the hospital.   Do not bring valuables (money, credit cards, checkbooks, etc.)   Do not wear any makeup (including  eye makeup) and no nail polish or artificial nails on your fingers or toes.  DO NOT wear any jewelry or piercings on day of surgery.  All body piercing jewelry must be removed.  If you have dentures, they will be removed before going to the OR; we will provide you a container.  If you wear contact lenses or glasses, they will be removed; please bring a case for them.  Shower the evening before or morning of surgery with antibacterial soap.  Nothing to eat or drink after 0600 day of procedure.   You may brush your teeth and gargle the morning of surgery.  DO NOT SWALLOW WATER.   May take Pravastatin, Sertraline with sip of water that morning (patient stated would wait until she came home).  Aspirin, Ibuprofen, Advil, Naproxen, Vitamin E and other Anti-inflammatory products and supplements should be stopped for 5 -7days before surgery or as directed by your physician.  Do not smoke or drink any alcoholic beverages 24 hours prior to surgery.  This includes NA Beer. Refrain from the usage of any recreational drugs, including non-prescribed prescription drugs.   You MUST plan for a responsible adult to stay on site while you are here and take you home after your surgery. You will not be allowed to leave alone or drive yourself home. It is strongly suggested someone stay with you the first 24 hrs. Your surgery will be cancelled if you do not have a ride home.  To help prevent infection, change your sheets the night before surgery.   If you  have a Living Will and Durable Power of  for Healthcare, please bring in a copy.  Notify your Surgeon if you develop any illness between now and time of surgery. Cough, cold, fever, sore throat, nausea,

## 2025-05-21 NOTE — TELEPHONE ENCOUNTER
Kath from [facility] called, Pt having frequent urination at night (about 5 times at night).  Pt BP high today 172/80 in left are and 186/74 in right arm.

## 2025-05-22 ENCOUNTER — OFFICE VISIT (OUTPATIENT)
Dept: FAMILY MEDICINE CLINIC | Age: 80
End: 2025-05-22
Payer: MEDICARE

## 2025-05-22 VITALS
OXYGEN SATURATION: 80 % | HEART RATE: 82 BPM | BODY MASS INDEX: 26.73 KG/M2 | HEIGHT: 64 IN | WEIGHT: 156.6 LBS | DIASTOLIC BLOOD PRESSURE: 70 MMHG | SYSTOLIC BLOOD PRESSURE: 154 MMHG

## 2025-05-22 DIAGNOSIS — E78.5 DYSLIPIDEMIA: ICD-10-CM

## 2025-05-22 DIAGNOSIS — I10 PRIMARY HYPERTENSION: ICD-10-CM

## 2025-05-22 DIAGNOSIS — R35.0 FREQUENCY OF URINATION: Primary | ICD-10-CM

## 2025-05-22 LAB
BILIRUBIN, POC: NEGATIVE
BLOOD URINE, POC: NEGATIVE
CLARITY, POC: NORMAL
COLOR, POC: YELLOW
GLUCOSE URINE, POC: NEGATIVE MG/DL
KETONES, POC: 15 MG/DL
LEUKOCYTE EST, POC: NORMAL
NITRITE, POC: NEGATIVE
PH, POC: 5.5
PROTEIN, POC: 30 MG/DL
SPECIFIC GRAVITY, POC: >=1.03
UROBILINOGEN, POC: 0.2 MG/DL

## 2025-05-22 PROCEDURE — 81002 URINALYSIS NONAUTO W/O SCOPE: CPT | Performed by: STUDENT IN AN ORGANIZED HEALTH CARE EDUCATION/TRAINING PROGRAM

## 2025-05-22 PROCEDURE — 1159F MED LIST DOCD IN RCRD: CPT | Performed by: STUDENT IN AN ORGANIZED HEALTH CARE EDUCATION/TRAINING PROGRAM

## 2025-05-22 PROCEDURE — G2211 COMPLEX E/M VISIT ADD ON: HCPCS | Performed by: STUDENT IN AN ORGANIZED HEALTH CARE EDUCATION/TRAINING PROGRAM

## 2025-05-22 PROCEDURE — 3078F DIAST BP <80 MM HG: CPT | Performed by: STUDENT IN AN ORGANIZED HEALTH CARE EDUCATION/TRAINING PROGRAM

## 2025-05-22 PROCEDURE — 99214 OFFICE O/P EST MOD 30 MIN: CPT | Performed by: STUDENT IN AN ORGANIZED HEALTH CARE EDUCATION/TRAINING PROGRAM

## 2025-05-22 PROCEDURE — 1123F ACP DISCUSS/DSCN MKR DOCD: CPT | Performed by: STUDENT IN AN ORGANIZED HEALTH CARE EDUCATION/TRAINING PROGRAM

## 2025-05-22 PROCEDURE — 3077F SYST BP >= 140 MM HG: CPT | Performed by: STUDENT IN AN ORGANIZED HEALTH CARE EDUCATION/TRAINING PROGRAM

## 2025-05-22 RX ORDER — LISINOPRIL 10 MG/1
10 TABLET ORAL DAILY
Qty: 90 TABLET | Refills: 0 | Status: SHIPPED | OUTPATIENT
Start: 2025-05-22

## 2025-05-22 RX ORDER — NITROFURANTOIN 25; 75 MG/1; MG/1
100 CAPSULE ORAL 2 TIMES DAILY
Qty: 20 CAPSULE | Refills: 0 | Status: SHIPPED | OUTPATIENT
Start: 2025-05-22 | End: 2025-06-01

## 2025-05-22 NOTE — PROGRESS NOTES
Rina Mora (:  1945) is a 80 y.o. female,Established patient, here for evaluation of the following chief complaint(s):  Frequent/Recurrent UTI (Frequency at night x 10 days. No pain or burning. ), Hypertension (Home care employees testing at Patients home. /BP high today 172/80 in left are and 186/74 in right arm), and Other (Patient is scheduled for colonoscopy tomorrow at [facility], she is on liquid diet today)         Assessment & Plan  Frequency of urination   Acute condition, new,  not enough urine left for urine culture.  Will treat empirically at this time.  Give instructions when to call back or when to seek a higher level of care.    Orders:    POCT Urinalysis no Micro    nitrofurantoin, macrocrystal-monohydrate, (MACROBID) 100 MG capsule; Take 1 capsule by mouth 2 times daily for 10 days    Primary hypertension   Chronic, not at goal (unstable), will add back lisinopril at this time.  Patient will monitor closely.    Orders:    lisinopril (PRINIVIL;ZESTRIL) 10 MG tablet; Take 1 tablet by mouth daily    Dyslipidemia   Chronic, at goal (stable), continue current treatment plan           No follow-ups on file.       Subjective   HPI    Patient is an 80-year-old female, who presents to clinic to follow-up for chronic conditions in addition to talking about a new concern.    Patient and caregiver reports frequent urination for the past 10 nights.  No dysuria, cloudy urine, smelly urine.  There is some urgency.  No pink tinge to the urine.  No confusion noted.  No dehydration noted.  POC urinalysis does show a slight amount of leukocytes.    Patient has essential hypertension.  She was previously on bisoprolol hydrochlorothiazide in addition to lisinopril.  These were all stopped after patient was having problems with hypotension.  Patient was then put on midodrine and then weaned off of midodrine.  Now patient is hypertensive again on no medication after several weeks.  Has had elevated

## 2025-05-22 NOTE — ASSESSMENT & PLAN NOTE
Chronic, not at goal (unstable), will add back lisinopril at this time.  Patient will monitor closely.    Orders:    lisinopril (PRINIVIL;ZESTRIL) 10 MG tablet; Take 1 tablet by mouth daily

## 2025-05-23 ENCOUNTER — ANESTHESIA EVENT (OUTPATIENT)
Dept: ENDOSCOPY | Age: 80
End: 2025-05-23
Payer: MEDICARE

## 2025-05-23 ENCOUNTER — ANESTHESIA (OUTPATIENT)
Dept: ENDOSCOPY | Age: 80
End: 2025-05-23
Payer: MEDICARE

## 2025-05-23 ENCOUNTER — HOSPITAL ENCOUNTER (OUTPATIENT)
Age: 80
Setting detail: OUTPATIENT SURGERY
Discharge: HOME | End: 2025-05-23
Attending: INTERNAL MEDICINE | Admitting: INTERNAL MEDICINE
Payer: MEDICARE

## 2025-05-23 VITALS
DIASTOLIC BLOOD PRESSURE: 52 MMHG | TEMPERATURE: 98.1 F | OXYGEN SATURATION: 98 % | BODY MASS INDEX: 25.78 KG/M2 | HEART RATE: 61 BPM | RESPIRATION RATE: 16 BRPM | WEIGHT: 151 LBS | HEIGHT: 64 IN | SYSTOLIC BLOOD PRESSURE: 156 MMHG

## 2025-05-23 DIAGNOSIS — K52.839 MICROSCOPIC COLITIS, UNSPECIFIED MICROSCOPIC COLITIS TYPE: ICD-10-CM

## 2025-05-23 DIAGNOSIS — K52.9 CHRONIC DIARRHEA OF UNKNOWN ORIGIN: ICD-10-CM

## 2025-05-23 PROCEDURE — 88305 TISSUE EXAM BY PATHOLOGIST: CPT

## 2025-05-23 PROCEDURE — 2580000003 HC RX 258: Performed by: ANESTHESIOLOGY

## 2025-05-23 PROCEDURE — 3609010300 HC COLONOSCOPY W/BIOPSY SINGLE/MULTIPLE: Performed by: INTERNAL MEDICINE

## 2025-05-23 PROCEDURE — 7100000011 HC PHASE II RECOVERY - ADDTL 15 MIN: Performed by: INTERNAL MEDICINE

## 2025-05-23 PROCEDURE — 2709999900 HC NON-CHARGEABLE SUPPLY: Performed by: INTERNAL MEDICINE

## 2025-05-23 PROCEDURE — 7100000010 HC PHASE II RECOVERY - FIRST 15 MIN: Performed by: INTERNAL MEDICINE

## 2025-05-23 PROCEDURE — 3609009900 HC COLONOSCOPY W/CONTROL BLEEDING ANY METHOD: Performed by: INTERNAL MEDICINE

## 2025-05-23 PROCEDURE — 3700000001 HC ADD 15 MINUTES (ANESTHESIA): Performed by: INTERNAL MEDICINE

## 2025-05-23 PROCEDURE — 3609010600 HC COLONOSCOPY POLYPECTOMY SNARE/COLD BIOPSY: Performed by: INTERNAL MEDICINE

## 2025-05-23 PROCEDURE — 6360000002 HC RX W HCPCS: Performed by: NURSE ANESTHETIST, CERTIFIED REGISTERED

## 2025-05-23 PROCEDURE — C1889 IMPLANT/INSERT DEVICE, NOC: HCPCS | Performed by: INTERNAL MEDICINE

## 2025-05-23 PROCEDURE — 3700000000 HC ANESTHESIA ATTENDED CARE: Performed by: INTERNAL MEDICINE

## 2025-05-23 DEVICE — WORKING LENGTH 235CM, WORKING CHANNEL 2.8MM
Type: IMPLANTABLE DEVICE | Status: FUNCTIONAL
Brand: RESOLUTION 360 CLIP

## 2025-05-23 RX ORDER — SODIUM CHLORIDE 9 MG/ML
INJECTION, SOLUTION INTRAVENOUS PRN
Status: DISCONTINUED | OUTPATIENT
Start: 2025-05-23 | End: 2025-05-23 | Stop reason: HOSPADM

## 2025-05-23 RX ORDER — SODIUM CHLORIDE 0.9 % (FLUSH) 0.9 %
5-40 SYRINGE (ML) INJECTION EVERY 12 HOURS SCHEDULED
Status: DISCONTINUED | OUTPATIENT
Start: 2025-05-23 | End: 2025-05-23 | Stop reason: HOSPADM

## 2025-05-23 RX ORDER — SODIUM CHLORIDE, SODIUM LACTATE, POTASSIUM CHLORIDE, CALCIUM CHLORIDE 600; 310; 30; 20 MG/100ML; MG/100ML; MG/100ML; MG/100ML
INJECTION, SOLUTION INTRAVENOUS CONTINUOUS
Status: DISCONTINUED | OUTPATIENT
Start: 2025-05-23 | End: 2025-05-23 | Stop reason: HOSPADM

## 2025-05-23 RX ORDER — MIDAZOLAM HYDROCHLORIDE 1 MG/ML
2 INJECTION, SOLUTION INTRAMUSCULAR; INTRAVENOUS
Status: DISCONTINUED | OUTPATIENT
Start: 2025-05-23 | End: 2025-05-23 | Stop reason: HOSPADM

## 2025-05-23 RX ORDER — SODIUM CHLORIDE 0.9 % (FLUSH) 0.9 %
5-40 SYRINGE (ML) INJECTION PRN
Status: DISCONTINUED | OUTPATIENT
Start: 2025-05-23 | End: 2025-05-23 | Stop reason: HOSPADM

## 2025-05-23 RX ORDER — PROPOFOL 10 MG/ML
INJECTION, EMULSION INTRAVENOUS
Status: DISCONTINUED | OUTPATIENT
Start: 2025-05-23 | End: 2025-05-23 | Stop reason: SDUPTHER

## 2025-05-23 RX ORDER — GLYCOPYRROLATE 0.2 MG/ML
INJECTION INTRAMUSCULAR; INTRAVENOUS
Status: DISCONTINUED | OUTPATIENT
Start: 2025-05-23 | End: 2025-05-23 | Stop reason: SDUPTHER

## 2025-05-23 RX ORDER — LIDOCAINE HYDROCHLORIDE 20 MG/ML
INJECTION, SOLUTION INFILTRATION; PERINEURAL
Status: DISCONTINUED | OUTPATIENT
Start: 2025-05-23 | End: 2025-05-23 | Stop reason: SDUPTHER

## 2025-05-23 RX ADMIN — LIDOCAINE HYDROCHLORIDE 60 MG: 20 INJECTION, SOLUTION INFILTRATION; PERINEURAL at 11:13

## 2025-05-23 RX ADMIN — SODIUM CHLORIDE, SODIUM LACTATE, POTASSIUM CHLORIDE, AND CALCIUM CHLORIDE: .6; .31; .03; .02 INJECTION, SOLUTION INTRAVENOUS at 10:20

## 2025-05-23 RX ADMIN — PROPOFOL 50 MG: 10 INJECTION, EMULSION INTRAVENOUS at 11:24

## 2025-05-23 RX ADMIN — PROPOFOL 50 MG: 10 INJECTION, EMULSION INTRAVENOUS at 11:18

## 2025-05-23 RX ADMIN — PROPOFOL 100 MG: 10 INJECTION, EMULSION INTRAVENOUS at 11:13

## 2025-05-23 RX ADMIN — GLYCOPYRROLATE 0.2 MG: 0.2 INJECTION, SOLUTION INTRAMUSCULAR; INTRAVENOUS at 11:31

## 2025-05-23 ASSESSMENT — PAIN - FUNCTIONAL ASSESSMENT: PAIN_FUNCTIONAL_ASSESSMENT: 0-10

## 2025-05-23 ASSESSMENT — PAIN SCALES - GENERAL
PAINLEVEL_OUTOF10: 0

## 2025-05-23 NOTE — ANESTHESIA POSTPROCEDURE EVALUATION
Department of Anesthesiology  Postprocedure Note    Patient: Rina Mora  MRN: [id number]  YOB: 1945  Date of evaluation: 5/23/2025    Procedure Summary       Date: 05/23/25 Room / Location: [place] [address] / [facility]    Anesthesia Start: 1106 Anesthesia Stop: 1141    Procedures:       COLONOSCOPY BIOPSY      COLONOSCOPY POLYPECTOMY SNARE/BIOPSY      COLONOSCOPY CONTROL HEMORRHAGE Diagnosis:       Chronic diarrhea of unknown origin      Microscopic colitis, unspecified microscopic colitis type      (Chronic diarrhea of unknown origin [K52.9])      (Microscopic colitis, unspecified microscopic colitis type [K52.839])    Surgeons: Ricki Galaviz MD Responsible Provider: Antoine Mchugh MD    Anesthesia Type: MAC ASA Status: 3            Anesthesia Type: No value filed.    Do Phase I: Do Score: 10    Do Phase II: Do Score: 10    Anesthesia Post Evaluation    Comments: Anes Post-op Note    Name:    Rina Mora  MRN:      [id number]    Patient Vitals in the past 12 hrs:  05/23/25 1141, BP:(!) 119/40, Pulse:53, SpO2:100 %  05/23/25 1004, BP:(!) 196/78, Temp:98.1 °F (36.7 °C), Pulse:69, Resp:16, SpO2:97 %     LABS:    CBC  Lab Results       Component                Value               Date/Time                  WBC                      8.7                 03/07/2024 07:41 AM        HGB                      13.1                03/07/2024 07:41 AM        HCT                      37.8                03/07/2024 07:41 AM        PLT                      306                 03/07/2024 07:41 AM   RENAL  Lab Results       Component                Value               Date/Time                  NA                       138                 10/30/2024 11:56 AM        K                        4.6                 10/30/2024 11:56 AM        CL                       100                 10/30/2024 11:56 AM        CO2                      29                  10/30/2024 11:56 AM

## 2025-05-23 NOTE — H&P
[facility]   Pre-operative History and Physical    Patient: Rina Mora  : 1945  Acct#:     HISTORY OF PRESENT ILLNESS:    Indications: diarrhea    The patient is a 80-year-old female with medical history of hyperlipidemia, GERD, depression, CKD stage III, diabetes mellitus type 2, fecal incontinence status post [employer] rectal stimulator 2024 referred for diarrhea of about 2 months duration. Patient follows with Dr. Lo and Oma Oreilly, CNP.  Reports both nocturnal and post prandial diarrhea with 5-6 loose stools daily. Denies abdominal pain, nausea, vomiting, fever, chills, hematochezia or melenic stools. Denies recent antibiotics use. No prior colonoscopy.     Labs on 5/10/2025 revealed negative ova and parasite, negative C. difficile toxin, negative GI bacterial pathogens.    Labs on 11/15/2025 noted negative fecal calprotectin 22, negative ova and parasites, negative C. difficile toxin.  Normal fecal elastase.      Past Medical History:        Diagnosis Date    Chronic kidney disease     discharged from this Dr in     CRD (chronic renal disease)     Depression     Herpes dermatitis     left thigh    Hyperlipidemia     Macular degeneration     Obesity     Osteopenia     Squamous acanthoma of face       Past Surgical History:        Procedure Laterality Date    ANGIOPLASTY      CHOLECYSTECTOMY  1987    FEMUR SURGERY Right 2025    GALLBLADDER SURGERY      Removal-    HYSTERECTOMY (CERVIX STATUS UNKNOWN)      STIMULATOR SURGERY N/A 2024    AXONICS SACRAL NERVE STIMULATOR, STAGE I WITH FLUOROSCOPY performed by Nicholas Lozano MD at [facility] OR    STIMULATOR SURGERY Right 2025    [employer] SACRAL NERVE STIMULATOR INSERTION STAGE II performed by Nicholas Lozano MD at [facility] OR    TOTAL KNEE ARTHROPLASTY Right 10/21/2019    RIGHT TOTAL KNEE ARTHROPLASTY - DEPUY STANDARD performed by Dima Day MD at [facility] OR      Medications Prior to Admission:

## 2025-05-23 NOTE — ANESTHESIA PRE PROCEDURE
Department of Anesthesiology  Preprocedure Note       Name:  Rina Mora   Age:  80 y.o.  :  1945                                          MRN:  [id number]         Date:  2025      Surgeon: Surgeon(s):  Ricki Galaviz MD    Procedure: Procedure(s):  COLONOSCOPY    Medications prior to admission:   Prior to Admission medications    Medication Sig Start Date End Date Taking? Authorizing Provider   lisinopril (PRINIVIL;ZESTRIL) 10 MG tablet Take 1 tablet by mouth daily 25   Berto Odom MD   nitrofurantoin, macrocrystal-monohydrate, (MACROBID) 100 MG capsule Take 1 capsule by mouth 2 times daily for 10 days 25  Berto Odom MD   aspirin 81 MG chewable tablet Take 1 tablet by mouth daily 25   Berto Odom MD   famotidine (PEPCID) 20 MG tablet Take 1 tablet by mouth 2 times daily 25   Berto Odom MD   folic acid (FOLVITE) 1 MG tablet Take 1 tablet by mouth daily 25   Berto Odom MD   melatonin 3 MG TABS tablet Take 1.5 tablets by mouth daily 25   Berto Odom MD   dicyclomine (BENTYL) 20 MG tablet Take 1 tablet by mouth 3 times daily 25   Suzie Marte APRN - CNP   donepezil (ARICEPT) 5 MG tablet TAKE ONE TABLET BY MOUTH ONCE NIGHTLY 25   Berto Odom MD   loperamide (IMODIUM) 2 MG capsule Take 1 capsule by mouth 4 times daily as needed for Diarrhea  Patient not taking: Reported on 2025    Whitney Beltran MD   VITAMIN E PO Take by mouth  Patient not taking: Reported on 2025    Whitney Beltran MD   pravastatin (PRAVACHOL) 40 MG tablet TAKE ONE TABLET BY MOUTH DAILY 24   Berto Odom MD   sertraline (ZOLOFT) 100 MG tablet 1 1/2 po daily 24   Berto Odom MD   Handicap Placard MISC by Does not apply route Osteoarthritis, knee pain  3--3- 3/15/24   Pancho Moses MD   Cholecalciferol (VITAMIN D-1000 MAX ST PO) Take by mouth  Patient not taking: Reported on 2025

## 2025-05-23 NOTE — DISCHARGE INSTRUCTIONS
PATIENT INSTRUCTIONS  POST-SEDATION          IMMEDIATELY FOLLOWING PROCEDURE:    Do not drive or operate machinery for the first twenty four hours after surgery.     Do not make any important decisions for twenty four hours after surgery or while taking narcotic pain medications or sedatives.     You should NOT BE LEFT UNATTENDED OR ALONE. A responsible adult should be with you for the rest of the day of your procedure and also during the night for your protection and safety.    You may experience some light headedness. Rest at home with activity as tolerated. You may not need to go to bed, but it is important to rest for the next 24 hours. You should not engage in athletic sports such as basketball, volleyball, jogging, skating, or activities requiring refined motor skills for 24 hours.   If you develop intractable nausea and vomiting or a severe headache please notify your doctor immediately.   You are not expected to have any fever, but if you feel warm, take your temperature. If you have a fever 101 degrees or higher, call your doctor.     If you have had an Endoscopy:   *Eat lightly for your first meal and gradually resume your normal / prescribed diet.    *If you have had a colonoscopy, do not expect a normal bowel movement for approximately three days due to the cleansing of the large intestine prior to colonoscopy.    ONCE YOU ARE HOME, IF YOU SHOULD HAVE:  Difficulty in breathing, persistent nausea or vomiting, bleeding you feel is excessive, or pain that is unusual, increased abdominal bloating, or any swelling, fever / chills, call your physician. If you cannot contact your physician, but feel that your signs and symptoms need a physician's attention, go to the Emergency Department.      FOLLOW-UP:    Please follow up with your Primary Care Provider as scheduled or needed.    Call Ricki Manuel MD if there are any GI concerns. [phone]    No need to repeat colonoscopy.    You may be receiving a

## 2025-05-28 ENCOUNTER — RESULTS FOLLOW-UP (OUTPATIENT)
Dept: GASTROENTEROLOGY | Age: 80
End: 2025-05-28

## 2025-05-28 DIAGNOSIS — F03.94 DEMENTIA WITH ANXIETY, UNSPECIFIED DEMENTIA SEVERITY, UNSPECIFIED DEMENTIA TYPE (HCC): ICD-10-CM

## 2025-05-28 RX ORDER — DONEPEZIL HYDROCHLORIDE 5 MG/1
5 TABLET, FILM COATED ORAL NIGHTLY
Qty: 90 TABLET | Refills: 0 | Status: CANCELLED | OUTPATIENT
Start: 2025-05-28

## 2025-05-28 RX ORDER — DONEPEZIL HYDROCHLORIDE 10 MG/1
10 TABLET, FILM COATED ORAL NIGHTLY
Qty: 30 TABLET | Refills: 1 | Status: SHIPPED | OUTPATIENT
Start: 2025-05-28

## 2025-06-10 ENCOUNTER — TELEPHONE (OUTPATIENT)
Dept: FAMILY MEDICINE CLINIC | Age: 80
End: 2025-06-10

## 2025-06-30 ENCOUNTER — TELEPHONE (OUTPATIENT)
Dept: FAMILY MEDICINE CLINIC | Age: 80
End: 2025-06-30

## 2025-07-08 RX ORDER — BISOPROLOL FUMARATE AND HYDROCHLOROTHIAZIDE 10; 6.25 MG/1; MG/1
1 TABLET ORAL DAILY
Qty: 90 TABLET | Refills: 1 | Status: SHIPPED | OUTPATIENT
Start: 2025-07-08

## 2025-07-16 DIAGNOSIS — I10 PRIMARY HYPERTENSION: ICD-10-CM

## 2025-07-16 RX ORDER — LISINOPRIL 10 MG/1
10 TABLET ORAL DAILY
Qty: 90 TABLET | Refills: 1 | Status: SHIPPED | OUTPATIENT
Start: 2025-07-16

## 2025-07-16 NOTE — TELEPHONE ENCOUNTER
Last Office Visit  -  05/22/2025  Next Office Visit  -  n/a    Last Filled  -    Last UDS -    Contract -

## 2025-07-23 RX ORDER — DONEPEZIL HYDROCHLORIDE 10 MG/1
TABLET, FILM COATED ORAL
Qty: 30 TABLET | Refills: 1 | Status: SHIPPED | OUTPATIENT
Start: 2025-07-23

## 2025-07-25 ENCOUNTER — TELEPHONE (OUTPATIENT)
Dept: FAMILY MEDICINE CLINIC | Age: 80
End: 2025-07-25

## 2025-07-25 ENCOUNTER — TELEMEDICINE (OUTPATIENT)
Dept: FAMILY MEDICINE CLINIC | Age: 80
End: 2025-07-25
Payer: MEDICARE

## 2025-07-25 DIAGNOSIS — N30.00 ACUTE CYSTITIS WITHOUT HEMATURIA: Primary | ICD-10-CM

## 2025-07-25 PROCEDURE — 99213 OFFICE O/P EST LOW 20 MIN: CPT | Performed by: NURSE PRACTITIONER

## 2025-07-25 RX ORDER — NITROFURANTOIN 25; 75 MG/1; MG/1
100 CAPSULE ORAL 2 TIMES DAILY
Qty: 14 CAPSULE | Refills: 0 | Status: SHIPPED | OUTPATIENT
Start: 2025-07-25 | End: 2025-08-01

## 2025-07-25 SDOH — ECONOMIC STABILITY: FOOD INSECURITY: WITHIN THE PAST 12 MONTHS, THE FOOD YOU BOUGHT JUST DIDN'T LAST AND YOU DIDN'T HAVE MONEY TO GET MORE.: NEVER TRUE

## 2025-07-25 SDOH — ECONOMIC STABILITY: FOOD INSECURITY: WITHIN THE PAST 12 MONTHS, YOU WORRIED THAT YOUR FOOD WOULD RUN OUT BEFORE YOU GOT MONEY TO BUY MORE.: NEVER TRUE

## 2025-07-25 ASSESSMENT — ENCOUNTER SYMPTOMS
RESPIRATORY NEGATIVE: 1
GASTROINTESTINAL NEGATIVE: 1

## 2025-07-25 NOTE — TELEPHONE ENCOUNTER
Left vm for Pt to call office back  Please see if they can do a VV, If so please place the, on EG schedule

## 2025-07-25 NOTE — TELEPHONE ENCOUNTER
[phone]   Daughter Kassandra called states pt has a UTI. She asked if the doctor would call in an antibiotic for her. She states she has been issued the antibiotic before. Advised she would probably need an appt. Pt had a small bowel obstruction in early June and daughter believes this contributed to the UTI    Please advise

## 2025-07-25 NOTE — PROGRESS NOTES
Rina Mora (:  1945) is a Established patient, presenting virtually for evaluation of the following:    Assessment & Plan   Below is the assessment and plan developed based on review of pertinent history, physical exam, labs, studies, and medications.  Assessment & Plan  Acute cystitis without hematuria  Patient has responded well to Macrobid in the past.  Will put back on Macrobid twice daily for a week and she will follow-up if there is no improvement next week.    Orders:    nitrofurantoin, macrocrystal-monohydrate, (MACROBID) 100 MG capsule; Take 1 capsule by mouth 2 times daily for 7 days              Subjective   HPI  Patient presents today with a caregiver with concerns of UTI.  Caregiver reports she is urinating more frequently the past few nights and had increased confusion in the evenings.  She does not typically get the symptoms of burning or urgency.  Patient reports she has had more diarrhea this week and the stools have been darker in color but her caregiver reports she has been taking Pepto-Bismol and feels that it is related to the color of the stool.  She does feel like the diarrhea likely caused the UTI.      Review of Systems   Constitutional: Negative.    HENT: Negative.     Respiratory: Negative.     Cardiovascular: Negative.    Gastrointestinal: Negative.    Genitourinary:  Positive for frequency.   Musculoskeletal: Negative.    Skin: Negative.    Psychiatric/Behavioral:  Positive for confusion.           Objective   Patient-Reported Vitals  No data recorded     Physical Exam  [INSTRUCTIONS:  \"[x]\" Indicates a positive item  \"[]\" Indicates a negative item  -- DELETE ALL ITEMS NOT EXAMINED]    Constitutional: [x] Appears well-developed and well-nourished [x] No apparent distress      [] Abnormal -     Mental status: [x] Alert and awake  [x] Oriented to person/place/time [x] Able to follow commands    [] Abnormal -     Eyes:   EOM    [x]  Normal    [] Abnormal -   Sclera  [x]  Normal

## 2025-07-29 DIAGNOSIS — F33.0 MAJOR DEPRESSIVE DISORDER, RECURRENT, MILD: ICD-10-CM

## 2025-07-30 RX ORDER — SERTRALINE HYDROCHLORIDE 100 MG/1
TABLET, FILM COATED ORAL
Qty: 135 TABLET | Refills: 3 | Status: SHIPPED | OUTPATIENT
Start: 2025-07-30

## 2025-07-30 NOTE — TELEPHONE ENCOUNTER
Last Office Visit  -  07/25/2025  Next Office Visit  -  n/a    Last Filled  -    Last UDS -    Contract -

## 2025-08-14 ENCOUNTER — TELEPHONE (OUTPATIENT)
Dept: PHARMACY | Facility: CLINIC | Age: 80
End: 2025-08-14

## 2025-09-02 ENCOUNTER — HOSPITAL ENCOUNTER (OUTPATIENT)
Dept: PHYSICAL THERAPY | Age: 80
Setting detail: THERAPIES SERIES
Discharge: HOME | End: 2025-09-02
Payer: MEDICARE

## 2025-09-02 PROCEDURE — 97161 PT EVAL LOW COMPLEX 20 MIN: CPT

## 2025-09-02 PROCEDURE — 97112 NEUROMUSCULAR REEDUCATION: CPT

## 2025-09-04 ENCOUNTER — HOSPITAL ENCOUNTER (OUTPATIENT)
Dept: PHYSICAL THERAPY | Age: 80
Setting detail: THERAPIES SERIES
Discharge: HOME | End: 2025-09-04
Payer: MEDICARE

## 2025-09-04 PROCEDURE — 97112 NEUROMUSCULAR REEDUCATION: CPT

## 2025-09-04 PROCEDURE — 97116 GAIT TRAINING THERAPY: CPT

## 2025-09-04 PROCEDURE — 97110 THERAPEUTIC EXERCISES: CPT

## (undated) DEVICE — SUTURE MONOCRYL + SZ 4-0 L27IN ABSRB UD L19MM PS-2 3/8 CIR MCP426H

## (undated) DEVICE — REMOTE CONTROL: Brand: AXONICS

## (undated) DEVICE — KIT OR ROOM TURNOVER W/STRAP

## (undated) DEVICE — DRESSING FOAM 0.75IN 1.5MM H DISK CHG IMPREG AEGIS

## (undated) DEVICE — TOWEL,STOP FLAG GOLD N-W: Brand: MEDLINE

## (undated) DEVICE — 3M™ IOBAN™ 2 ANTIMICROBIAL INCISE DRAPE 6648EZ: Brand: IOBAN™ 2

## (undated) DEVICE — PORT INSERTION: Brand: MEDLINE INDUSTRIES, INC.

## (undated) DEVICE — SYSTEM SKIN CLSR 22CM DERMBND PRINEO

## (undated) DEVICE — SUTURE VCRL SZ 2-0 L18IN ABSRB UD CT-1 L36MM 1/2 CIR J839D

## (undated) DEVICE — ZIMMER® STERILE DISPOSABLE TOURNIQUET CUFF WITH PLC, DUAL PORT, SINGLE BLADDER, 34 IN. (86 CM)

## (undated) DEVICE — ENDOSCOPIC KIT 2 12 FT OP4 DE2 GWN SYR

## (undated) DEVICE — TOTAL BASIC PK

## (undated) DEVICE — RECIPROCATING BLADE, DOUBLE SIDED, OFFSET  (70.0 X 0.64 X 12.6MM)

## (undated) DEVICE — SUTURE STRATAFIX SZ 1 L14IN ABSRB VLT L36MM MO-4 TAPERPOINT SXPD2B400

## (undated) DEVICE — NEPTUNE E-SEP SMOKE EVACUATION PENCIL, COATED, 70MM BLADE, PUSH BUTTON SWITCH: Brand: NEPTUNE E-SEP

## (undated) DEVICE — SNARE ENDOSCP L240CM SHTH DIA24MM LOOP W10MM POLYP RND REINF

## (undated) DEVICE — SHEET, T, LAPAROTOMY, STERILE: Brand: MEDLINE

## (undated) DEVICE — FORCEPS BX L240CM JAW DIA2.8MM L CAP W/ NDL MIC MESH TOOTH

## (undated) DEVICE — SPONGE,DRAIN,NONWVN,4"X4",6PLY,STRL,LF: Brand: MEDLINE

## (undated) DEVICE — Z DISCONTINUED USE 2275686 GLOVE SURG SZ 8 L12IN FNGR THK13MIL WHT ISOLEX POLYISOPRENE

## (undated) DEVICE — HANDPIECE SET WITH HIGH FLOW TIP AND SUCTION TUBE: Brand: INTERPULSE

## (undated) DEVICE — FAIRFIELD KNEE LF

## (undated) DEVICE — GLOVE ORANGE PI 8   MSG9080

## (undated) DEVICE — TRAP FLUID

## (undated) DEVICE — STERILE TOTAL KNEE DRAPE PACK: Brand: CARDINAL HEALTH

## (undated) DEVICE — T4 HOOD

## (undated) DEVICE — GLOVE SURG SZ 7 CRM LTX FREE POLYISOPRENE POLYMER BEAD ANTI

## (undated) DEVICE — ELECTRODE,ECG,STRESS,FOAM,3PK: Brand: MEDLINE

## (undated) DEVICE — DRESSING CNTCT 4X12IN IS THERABOND 3D

## (undated) DEVICE — SPONGE LAP W18XL18IN WHT COT 4 PLY FLD STRUNG RADPQ DISP ST

## (undated) DEVICE — Z CONVERTED USE 2271043 CONTAINER SPEC COLL 4OZ SCR ON LID PEEL PCH

## (undated) DEVICE — APPLICATOR PREP 26ML 0.7% IOD POVACRYLEX 74% ISO ALC ST

## (undated) DEVICE — LIQUIBAND RAPID ADHESIVE 36/CS 0.8ML: Brand: MEDLINE

## (undated) DEVICE — Z DISCONTINUED USE 2220190 SUTURE VICRYL SZ 3-0 L27IN ABSRB UD L26MM SH 1/2 CIR J416H

## (undated) DEVICE — 2108 SERIES SAGITTAL BLADE (20.7 X 0.88 X 85.0MM)

## (undated) DEVICE — Z INACTIVE USE 2660664 SOLUTION IRRIG 3000ML 0.9% SOD CHL USP UROMATIC PLAS CONT

## (undated) DEVICE — SUTURE VCRL SZ 0 L18IN ABSRB UD L36MM CT-1 1/2 CIR J840D

## (undated) DEVICE — SUTURE VICRYL + SZ 3-0 L18IN ABSRB UD SH 1/2 CIR TAPERCUT NDL VCP864D

## (undated) DEVICE — GOWN,SIRUS,POLYRNF,BRTHSLV,LG,30/CS: Brand: MEDLINE

## (undated) DEVICE — TRAP SPEC POLYPR SGL CHMBR FN MESH SCRN

## (undated) DEVICE — 3M™ STERI-DRAPE™ INSTRUMENT POUCH 1018: Brand: STERI-DRAPE™

## (undated) DEVICE — PERCUTANEOUS EXTENSION: Brand: AXONICS

## (undated) DEVICE — ATTUNE SOLO PINNING SYSTEM

## (undated) DEVICE — KNEE HOLDER DISPOSABLE LINER: Brand: ALVARADO®  KNEE SUPPORT

## (undated) DEVICE — 3 BONE CEMENT MIXER: Brand: MIXEVAC

## (undated) DEVICE — PENCIL ES ULT VAC W TELSCP NOSE EZ CLN BLDE 10FT

## (undated) DEVICE — CANNULA NSL AD TBNG L7FT PVC STR NONFLARED PRNG O2 DEL W STD

## (undated) DEVICE — C-ARMOR C-ARM EQUIPMENT COVERS CLEAR STERILE UNIVERSAL FIT 12 PER CASE: Brand: C-ARMOR

## (undated) DEVICE — 35 ML SYRINGE LUER-LOCK TIP: Brand: MONOJECT

## (undated) DEVICE — SUTURE MCRYL SZ 4-0 L27IN ABSRB UD L19MM PS-2 1/2 CIR PRIM Y426H

## (undated) DEVICE — STAPLER SKIN H3.9MM WIRE DIA0.58MM CRWN 6.9MM 35 STPL ROT

## (undated) DEVICE — DECANTER FLD 9IN ST BG FOR ASEP TRNSF OF FLD

## (undated) DEVICE — 450 ML BOTTLE OF 0.05% CHLORHEXIDINE GLUCONATE IN 99.95% STERILE WATER FOR IRRIGATION, USP AND APPLICATOR.: Brand: IRRISEPT ANTIMICROBIAL WOUND LAVAGE